# Patient Record
Sex: FEMALE | Race: WHITE | Employment: FULL TIME
[De-identification: names, ages, dates, MRNs, and addresses within clinical notes are randomized per-mention and may not be internally consistent; named-entity substitution may affect disease eponyms.]

---

## 2014-12-22 LAB — HIV AG/AB: NORMAL

## 2017-01-17 DIAGNOSIS — G43.109 MIGRAINE WITH AURA AND WITHOUT STATUS MIGRAINOSUS, NOT INTRACTABLE: ICD-10-CM

## 2017-01-17 RX ORDER — TOPIRAMATE 25 MG/1
25 TABLET ORAL 2 TIMES DAILY
Qty: 60 TABLET | Refills: 0 | Status: SHIPPED | OUTPATIENT
Start: 2017-01-17 | End: 2017-02-20 | Stop reason: SDUPTHER

## 2017-02-28 ENCOUNTER — OFFICE VISIT (OUTPATIENT)
Dept: FAMILY MEDICINE CLINIC | Facility: CLINIC | Age: 32
End: 2017-02-28

## 2017-02-28 VITALS
RESPIRATION RATE: 16 BRPM | HEART RATE: 84 BPM | SYSTOLIC BLOOD PRESSURE: 104 MMHG | WEIGHT: 184.2 LBS | DIASTOLIC BLOOD PRESSURE: 70 MMHG | BODY MASS INDEX: 37.13 KG/M2 | TEMPERATURE: 97.2 F | HEIGHT: 59 IN

## 2017-02-28 DIAGNOSIS — K21.9 GASTROESOPHAGEAL REFLUX DISEASE WITHOUT ESOPHAGITIS: Chronic | ICD-10-CM

## 2017-02-28 DIAGNOSIS — L60.3 BRITTLE NAILS: ICD-10-CM

## 2017-02-28 DIAGNOSIS — B00.9 HERPES SIMPLEX: ICD-10-CM

## 2017-02-28 DIAGNOSIS — G43.109 MIGRAINE WITH AURA AND WITHOUT STATUS MIGRAINOSUS, NOT INTRACTABLE: Primary | ICD-10-CM

## 2017-02-28 DIAGNOSIS — Z00.00 WELL ADULT EXAM: ICD-10-CM

## 2017-02-28 PROCEDURE — 99214 OFFICE O/P EST MOD 30 MIN: CPT | Performed by: NURSE PRACTITIONER

## 2017-02-28 RX ORDER — RIZATRIPTAN BENZOATE 10 MG/1
10 TABLET, ORALLY DISINTEGRATING ORAL
Qty: 9 TABLET | Refills: 1 | Status: SHIPPED | OUTPATIENT
Start: 2017-02-28 | End: 2017-07-18

## 2017-02-28 RX ORDER — PANTOPRAZOLE SODIUM 40 MG/1
40 TABLET, DELAYED RELEASE ORAL DAILY
Qty: 30 TABLET | Refills: 5 | Status: SHIPPED | OUTPATIENT
Start: 2017-02-28 | End: 2017-10-07 | Stop reason: SDUPTHER

## 2017-02-28 RX ORDER — TOPIRAMATE 25 MG/1
25 TABLET ORAL 2 TIMES DAILY
Qty: 60 TABLET | Refills: 5 | Status: SHIPPED | OUTPATIENT
Start: 2017-02-28 | End: 2022-01-11 | Stop reason: DRUGHIGH

## 2017-02-28 ASSESSMENT — ENCOUNTER SYMPTOMS
BELCHING: 1
BACK PAIN: 0
NAUSEA: 1
DIARRHEA: 0
EYE WATERING: 0
COUGH: 0
EYE PAIN: 1
EYE DISCHARGE: 0
TROUBLE SWALLOWING: 0
RHINORRHEA: 0
WHEEZING: 0
EYE REDNESS: 0
SORE THROAT: 0
BLURRED VISION: 1
CONSTIPATION: 1
SHORTNESS OF BREATH: 0
VOMITING: 1
ABDOMINAL PAIN: 1

## 2017-02-28 ASSESSMENT — PATIENT HEALTH QUESTIONNAIRE - PHQ9
SUM OF ALL RESPONSES TO PHQ QUESTIONS 1-9: 0
SUM OF ALL RESPONSES TO PHQ9 QUESTIONS 1 & 2: 0
1. LITTLE INTEREST OR PLEASURE IN DOING THINGS: 0
2. FEELING DOWN, DEPRESSED OR HOPELESS: 0

## 2017-03-01 ENCOUNTER — HOSPITAL ENCOUNTER (OUTPATIENT)
Age: 32
Setting detail: SPECIMEN
Discharge: HOME | End: 2017-03-01
Payer: COMMERCIAL

## 2017-03-01 DIAGNOSIS — Z00.00 WELL ADULT EXAM: ICD-10-CM

## 2017-03-01 DIAGNOSIS — L60.3 BRITTLE NAILS: ICD-10-CM

## 2017-03-01 LAB
ALBUMIN SERPL-MCNC: 4.2 G/DL (ref 3.5–5.2)
ALBUMIN/GLOBULIN RATIO: 1.8 (ref 1–2.5)
ALP BLD-CCNC: 108 U/L (ref 35–104)
ALT SERPL-CCNC: 21 U/L (ref 5–33)
ANION GAP SERPL CALCULATED.3IONS-SCNC: 16 MMOL/L (ref 9–17)
AST SERPL-CCNC: 13 U/L
BILIRUB SERPL-MCNC: <0.1 MG/DL (ref 0.3–1.2)
BUN BLDV-MCNC: 9 MG/DL (ref 6–20)
BUN/CREAT BLD: ABNORMAL (ref 9–20)
CALCIUM SERPL-MCNC: 9 MG/DL (ref 8.6–10.4)
CHLORIDE BLD-SCNC: 104 MMOL/L (ref 98–107)
CHOLESTEROL/HDL RATIO: 8.6
CHOLESTEROL: 180 MG/DL
CO2: 23 MMOL/L (ref 20–31)
CREAT SERPL-MCNC: 0.74 MG/DL (ref 0.5–0.9)
GFR AFRICAN AMERICAN: >60 ML/MIN
GFR NON-AFRICAN AMERICAN: >60 ML/MIN
GFR SERPL CREATININE-BSD FRML MDRD: ABNORMAL ML/MIN/{1.73_M2}
GFR SERPL CREATININE-BSD FRML MDRD: ABNORMAL ML/MIN/{1.73_M2}
GLUCOSE BLD-MCNC: 96 MG/DL (ref 70–99)
HCT VFR BLD CALC: 30.3 % (ref 36–46)
HDLC SERPL-MCNC: 21 MG/DL
HEMOGLOBIN: 10.1 G/DL (ref 12–16)
LDL CHOLESTEROL: 116 MG/DL (ref 0–130)
MCH RBC QN AUTO: 24.6 PG (ref 26–34)
MCHC RBC AUTO-ENTMCNC: 33.4 G/DL (ref 31–37)
MCV RBC AUTO: 73.7 FL (ref 80–100)
PDW BLD-RTO: 16 % (ref 12.5–15.4)
PLATELET # BLD: 346 K/UL (ref 140–450)
PMV BLD AUTO: 7.2 FL (ref 6–12)
POTASSIUM SERPL-SCNC: 4.2 MMOL/L (ref 3.7–5.3)
RBC # BLD: 4.11 M/UL (ref 4–5.2)
SODIUM BLD-SCNC: 143 MMOL/L (ref 135–144)
TOTAL PROTEIN: 6.6 G/DL (ref 6.4–8.3)
TRIGL SERPL-MCNC: 216 MG/DL
TSH SERPL DL<=0.05 MIU/L-ACNC: 2.05 MIU/L (ref 0.3–5)
VITAMIN B-12: 326 PG/ML (ref 211–946)
VITAMIN D 25-HYDROXY: 12.4 NG/ML (ref 30–100)
VLDLC SERPL CALC-MCNC: ABNORMAL MG/DL (ref 1–30)
WBC # BLD: 7.9 K/UL (ref 3.5–11)

## 2017-03-02 DIAGNOSIS — E55.9 VITAMIN D DEFICIENCY: Primary | ICD-10-CM

## 2017-03-03 RX ORDER — CHOLECALCIFEROL (VITAMIN D3) 1250 MCG
1 CAPSULE ORAL WEEKLY
Qty: 4 CAPSULE | Refills: 3 | Status: SHIPPED | OUTPATIENT
Start: 2017-03-03 | End: 2017-10-26 | Stop reason: ALTCHOICE

## 2017-03-29 ENCOUNTER — E-VISIT (OUTPATIENT)
Dept: FAMILY MEDICINE CLINIC | Facility: CLINIC | Age: 32
End: 2017-03-29

## 2017-03-29 DIAGNOSIS — B37.9 YEAST INFECTION: Primary | ICD-10-CM

## 2017-03-29 DIAGNOSIS — N39.0 URINARY TRACT INFECTION WITHOUT HEMATURIA, SITE UNSPECIFIED: ICD-10-CM

## 2017-03-30 RX ORDER — FLUCONAZOLE 150 MG/1
150 TABLET ORAL DAILY
Qty: 3 TABLET | Refills: 0 | Status: SHIPPED | OUTPATIENT
Start: 2017-03-30 | End: 2017-12-29 | Stop reason: SDUPTHER

## 2017-03-30 RX ORDER — SULFAMETHOXAZOLE AND TRIMETHOPRIM 800; 160 MG/1; MG/1
1 TABLET ORAL 2 TIMES DAILY
Qty: 14 TABLET | Refills: 0 | Status: SHIPPED | OUTPATIENT
Start: 2017-03-30 | End: 2017-04-06

## 2017-04-27 RX ORDER — VALACYCLOVIR HYDROCHLORIDE 1 G/1
1000 TABLET, FILM COATED ORAL DAILY
Qty: 30 TABLET | Refills: 5 | Status: SHIPPED | OUTPATIENT
Start: 2017-04-27 | End: 2017-11-02 | Stop reason: SDUPTHER

## 2017-06-21 ENCOUNTER — OFFICE VISIT (OUTPATIENT)
Dept: FAMILY MEDICINE CLINIC | Age: 32
End: 2017-06-21
Payer: COMMERCIAL

## 2017-06-21 VITALS
HEART RATE: 72 BPM | SYSTOLIC BLOOD PRESSURE: 110 MMHG | BODY MASS INDEX: 36.89 KG/M2 | DIASTOLIC BLOOD PRESSURE: 82 MMHG | TEMPERATURE: 98.3 F | HEIGHT: 59 IN | WEIGHT: 183 LBS | RESPIRATION RATE: 20 BRPM

## 2017-06-21 DIAGNOSIS — R10.2 PELVIC PAIN IN FEMALE: Primary | ICD-10-CM

## 2017-06-21 DIAGNOSIS — R45.89 DEPRESSED AFFECT: ICD-10-CM

## 2017-06-21 PROCEDURE — 99214 OFFICE O/P EST MOD 30 MIN: CPT | Performed by: NURSE PRACTITIONER

## 2017-06-21 ASSESSMENT — ENCOUNTER SYMPTOMS
COUGH: 0
SHORTNESS OF BREATH: 0
ABDOMINAL PAIN: 1
SORE THROAT: 0
EYE DISCHARGE: 0
RHINORRHEA: 0
VOMITING: 1
WHEEZING: 0
BACK PAIN: 0
DIARRHEA: 1
NAUSEA: 1
TROUBLE SWALLOWING: 0
CONSTIPATION: 1
EYE PAIN: 0

## 2017-07-18 ENCOUNTER — HOSPITAL ENCOUNTER (EMERGENCY)
Facility: CLINIC | Age: 32
Discharge: HOME | End: 2017-07-18
Attending: EMERGENCY MEDICINE
Payer: COMMERCIAL

## 2017-07-18 VITALS
HEART RATE: 97 BPM | TEMPERATURE: 98.4 F | DIASTOLIC BLOOD PRESSURE: 78 MMHG | OXYGEN SATURATION: 100 % | WEIGHT: 180 LBS | RESPIRATION RATE: 16 BRPM | SYSTOLIC BLOOD PRESSURE: 115 MMHG | BODY MASS INDEX: 37.79 KG/M2 | HEIGHT: 58 IN

## 2017-07-18 DIAGNOSIS — R10.30 RECURRENT LOWER ABDOMINAL PAIN: Primary | ICD-10-CM

## 2017-07-18 LAB
ABSOLUTE EOS #: 0.1 K/UL (ref 0–0.4)
ABSOLUTE LYMPH #: 1.9 K/UL (ref 1–4.8)
ABSOLUTE MONO #: 0.8 K/UL (ref 0.1–1.2)
ALBUMIN SERPL-MCNC: 4.2 G/DL (ref 3.5–5.2)
ALBUMIN/GLOBULIN RATIO: 1.1 (ref 1–2.5)
ALP BLD-CCNC: 102 U/L (ref 35–104)
ALT SERPL-CCNC: 20 U/L (ref 5–33)
ANION GAP SERPL CALCULATED.3IONS-SCNC: 16 MMOL/L (ref 9–17)
AST SERPL-CCNC: 15 U/L
BASOPHILS # BLD: 0 %
BASOPHILS ABSOLUTE: 0 K/UL (ref 0–0.2)
BILIRUB SERPL-MCNC: 0.3 MG/DL (ref 0.3–1.2)
BILIRUBIN DIRECT: 0.09 MG/DL
BILIRUBIN URINE: NEGATIVE
BILIRUBIN, INDIRECT: 0.21 MG/DL (ref 0–1)
BUN BLDV-MCNC: 7 MG/DL (ref 6–20)
BUN/CREAT BLD: ABNORMAL (ref 9–20)
CALCIUM SERPL-MCNC: 9.4 MG/DL (ref 8.6–10.4)
CHLORIDE BLD-SCNC: 104 MMOL/L (ref 98–107)
CO2: 20 MMOL/L (ref 20–31)
COLOR: YELLOW
COMMENT UA: NORMAL
CREAT SERPL-MCNC: 0.7 MG/DL (ref 0.5–0.9)
DIFFERENTIAL TYPE: ABNORMAL
EOSINOPHILS RELATIVE PERCENT: 1 %
GFR AFRICAN AMERICAN: >60 ML/MIN
GFR NON-AFRICAN AMERICAN: >60 ML/MIN
GFR SERPL CREATININE-BSD FRML MDRD: ABNORMAL ML/MIN/{1.73_M2}
GFR SERPL CREATININE-BSD FRML MDRD: ABNORMAL ML/MIN/{1.73_M2}
GLOBULIN: NORMAL G/DL (ref 1.5–3.8)
GLUCOSE BLD-MCNC: 118 MG/DL (ref 70–99)
GLUCOSE URINE: NEGATIVE
HCG QUALITATIVE: NEGATIVE
HCT VFR BLD CALC: 35.4 % (ref 36–46)
HEMOGLOBIN: 11.5 G/DL (ref 12–16)
KETONES, URINE: NEGATIVE
LEUKOCYTE ESTERASE, URINE: NEGATIVE
LIPASE: 25 U/L (ref 13–60)
LYMPHOCYTES # BLD: 13 %
MCH RBC QN AUTO: 24.7 PG (ref 26–34)
MCHC RBC AUTO-ENTMCNC: 32.5 G/DL (ref 31–37)
MCV RBC AUTO: 75.9 FL (ref 80–100)
MONOCYTES # BLD: 6 %
NITRITE, URINE: NEGATIVE
PDW BLD-RTO: 16.6 % (ref 12.5–15.4)
PH UA: 7.5 (ref 5–8)
PLATELET # BLD: 333 K/UL (ref 140–450)
PLATELET ESTIMATE: ABNORMAL
PMV BLD AUTO: 7 FL (ref 6–12)
POC CHLORIDE: 107 MMOL/L (ref 98–110)
POC POTASSIUM: 3.7 MMOL/L (ref 3.5–5.1)
POC SODIUM: 140 MMOL/L (ref 136–145)
POTASSIUM SERPL-SCNC: 4 MMOL/L (ref 3.7–5.3)
PROTEIN UA: NEGATIVE
RBC # BLD: 4.66 M/UL (ref 4–5.2)
RBC # BLD: ABNORMAL 10*6/UL
SEG NEUTROPHILS: 80 %
SEGMENTED NEUTROPHILS ABSOLUTE COUNT: 11.7 K/UL (ref 1.8–7.7)
SODIUM BLD-SCNC: 140 MMOL/L (ref 135–144)
SPECIFIC GRAVITY UA: 1.02 (ref 1–1.03)
TOTAL PROTEIN: 7.9 G/DL (ref 6.4–8.3)
TURBIDITY: CLEAR
URINE HGB: NEGATIVE
UROBILINOGEN, URINE: NORMAL
WBC # BLD: 14.5 K/UL (ref 3.5–11)
WBC # BLD: ABNORMAL 10*3/UL

## 2017-07-18 PROCEDURE — 36415 COLL VENOUS BLD VENIPUNCTURE: CPT

## 2017-07-18 PROCEDURE — 6360000002 HC RX W HCPCS: Performed by: EMERGENCY MEDICINE

## 2017-07-18 PROCEDURE — 85025 COMPLETE CBC W/AUTO DIFF WBC: CPT

## 2017-07-18 PROCEDURE — 99284 EMERGENCY DEPT VISIT MOD MDM: CPT

## 2017-07-18 PROCEDURE — 96375 TX/PRO/DX INJ NEW DRUG ADDON: CPT

## 2017-07-18 PROCEDURE — 80076 HEPATIC FUNCTION PANEL: CPT

## 2017-07-18 PROCEDURE — 2580000003 HC RX 258: Performed by: EMERGENCY MEDICINE

## 2017-07-18 PROCEDURE — 81003 URINALYSIS AUTO W/O SCOPE: CPT

## 2017-07-18 PROCEDURE — 87086 URINE CULTURE/COLONY COUNT: CPT

## 2017-07-18 PROCEDURE — 83690 ASSAY OF LIPASE: CPT

## 2017-07-18 PROCEDURE — 80048 BASIC METABOLIC PNL TOTAL CA: CPT

## 2017-07-18 PROCEDURE — 96374 THER/PROPH/DIAG INJ IV PUSH: CPT

## 2017-07-18 PROCEDURE — 84703 CHORIONIC GONADOTROPIN ASSAY: CPT

## 2017-07-18 RX ORDER — FENTANYL CITRATE 50 UG/ML
50 INJECTION, SOLUTION INTRAMUSCULAR; INTRAVENOUS ONCE
Status: COMPLETED | OUTPATIENT
Start: 2017-07-18 | End: 2017-07-18

## 2017-07-18 RX ORDER — ONDANSETRON 2 MG/ML
4 INJECTION INTRAMUSCULAR; INTRAVENOUS ONCE
Status: COMPLETED | OUTPATIENT
Start: 2017-07-18 | End: 2017-07-18

## 2017-07-18 RX ORDER — 0.9 % SODIUM CHLORIDE 0.9 %
1000 INTRAVENOUS SOLUTION INTRAVENOUS ONCE
Status: COMPLETED | OUTPATIENT
Start: 2017-07-18 | End: 2017-07-18

## 2017-07-18 RX ORDER — OXYCODONE HYDROCHLORIDE AND ACETAMINOPHEN 5; 325 MG/1; MG/1
1-2 TABLET ORAL EVERY 6 HOURS PRN
Qty: 20 TABLET | Refills: 0 | Status: SHIPPED | OUTPATIENT
Start: 2017-07-18 | End: 2017-07-25

## 2017-07-18 RX ORDER — IBUPROFEN 800 MG/1
800 TABLET ORAL EVERY 8 HOURS PRN
Qty: 30 TABLET | Refills: 0 | Status: SHIPPED | OUTPATIENT
Start: 2017-07-18 | End: 2018-07-27 | Stop reason: SDUPTHER

## 2017-07-18 RX ORDER — KETOROLAC TROMETHAMINE 30 MG/ML
30 INJECTION, SOLUTION INTRAMUSCULAR; INTRAVENOUS ONCE
Status: COMPLETED | OUTPATIENT
Start: 2017-07-18 | End: 2017-07-18

## 2017-07-18 RX ADMIN — ONDANSETRON 4 MG: 2 INJECTION INTRAMUSCULAR; INTRAVENOUS at 12:41

## 2017-07-18 RX ADMIN — SODIUM CHLORIDE 1000 ML: 9 INJECTION, SOLUTION INTRAVENOUS at 11:20

## 2017-07-18 RX ADMIN — FENTANYL CITRATE 50 MCG: 50 INJECTION INTRAMUSCULAR; INTRAVENOUS at 12:42

## 2017-07-18 RX ADMIN — KETOROLAC TROMETHAMINE 30 MG: 30 INJECTION, SOLUTION INTRAMUSCULAR at 11:20

## 2017-07-18 ASSESSMENT — PAIN DESCRIPTION - PAIN TYPE: TYPE: ACUTE PAIN

## 2017-07-18 ASSESSMENT — PAIN DESCRIPTION - DESCRIPTORS: DESCRIPTORS: CRAMPING;SHARP

## 2017-07-18 ASSESSMENT — ENCOUNTER SYMPTOMS
ABDOMINAL PAIN: 1
SHORTNESS OF BREATH: 0
NAUSEA: 0
BLOOD IN STOOL: 0
DIARRHEA: 1
COUGH: 0
BACK PAIN: 0
CONSTIPATION: 0
EYE PAIN: 0
VOMITING: 0

## 2017-07-18 ASSESSMENT — PAIN SCALES - GENERAL
PAINLEVEL_OUTOF10: 6
PAINLEVEL_OUTOF10: 2
PAINLEVEL_OUTOF10: 6

## 2017-07-18 ASSESSMENT — PAIN DESCRIPTION - FREQUENCY: FREQUENCY: INTERMITTENT

## 2017-07-19 LAB
CULTURE: NO GROWTH
CULTURE: NORMAL
Lab: NORMAL
SPECIMEN DESCRIPTION: NORMAL
SPECIMEN DESCRIPTION: NORMAL
STATUS: NORMAL

## 2017-09-15 ENCOUNTER — TELEPHONE (OUTPATIENT)
Dept: FAMILY MEDICINE CLINIC | Age: 32
End: 2017-09-15

## 2017-09-15 DIAGNOSIS — E55.9 VITAMIN D DEFICIENCY: ICD-10-CM

## 2017-09-15 RX ORDER — CHOLECALCIFEROL (VITAMIN D3) 1250 MCG
1 CAPSULE ORAL WEEKLY
Qty: 4 CAPSULE | Refills: 3 | OUTPATIENT
Start: 2017-09-15 | End: 2017-12-30

## 2017-09-19 ENCOUNTER — HOSPITAL ENCOUNTER (OUTPATIENT)
Age: 32
Setting detail: SPECIMEN
Discharge: HOME | End: 2017-09-19
Payer: COMMERCIAL

## 2017-09-19 DIAGNOSIS — E55.9 VITAMIN D DEFICIENCY: ICD-10-CM

## 2017-09-19 LAB — VITAMIN D 25-HYDROXY: 51.1 NG/ML (ref 30–100)

## 2017-10-07 DIAGNOSIS — K21.9 GASTROESOPHAGEAL REFLUX DISEASE WITHOUT ESOPHAGITIS: Chronic | ICD-10-CM

## 2017-10-09 NOTE — TELEPHONE ENCOUNTER
LOV and LRF 2/28/17    Health Maintenance   Topic Date Due    HIV screen  04/28/2000    Flu vaccine (1) 09/01/2017    Cervical cancer screen  12/01/2017    DTaP/Tdap/Td vaccine (2 - Td) 07/09/2025             (applicable per patient's age: Cancer Screenings, Depression Screening, Fall Risk Screening, Immunizations)    Hemoglobin A1C (%)   Date Value   10/26/2012 4.6     LDL Cholesterol (mg/dL)   Date Value   03/01/2017 116     AST (U/L)   Date Value   07/18/2017 15     ALT (U/L)   Date Value   07/18/2017 20     BUN (mg/dL)   Date Value   07/18/2017 7      (goal A1C is < 7)   (goal LDL is <100) need 30-50% reduction from baseline     BP Readings from Last 3 Encounters:   07/18/17 115/78   06/21/17 110/82   02/28/17 104/70    (goal /80)      All Future Testing planned in [facility]:  Lab Frequency Next Occurrence       Next Visit Date:  No future appointments.          Patient Active Problem List:     GERD (gastroesophageal reflux disease)     MTHFR (methylene THF reductase) deficiency and homocystinuria (HCC)     Migraine with aura and without status migrainosus, not intractable     Herpes simplex

## 2017-10-10 RX ORDER — PANTOPRAZOLE SODIUM 40 MG/1
40 TABLET, DELAYED RELEASE ORAL DAILY
Qty: 30 TABLET | Refills: 4 | Status: SHIPPED | OUTPATIENT
Start: 2017-10-10 | End: 2018-03-21 | Stop reason: SDUPTHER

## 2017-10-26 ENCOUNTER — OFFICE VISIT (OUTPATIENT)
Dept: FAMILY MEDICINE CLINIC | Age: 32
End: 2017-10-26
Payer: COMMERCIAL

## 2017-10-26 VITALS
HEIGHT: 58 IN | DIASTOLIC BLOOD PRESSURE: 74 MMHG | HEART RATE: 82 BPM | RESPIRATION RATE: 16 BRPM | BODY MASS INDEX: 37.11 KG/M2 | WEIGHT: 176.8 LBS | SYSTOLIC BLOOD PRESSURE: 116 MMHG

## 2017-10-26 DIAGNOSIS — T75.3XXA CAR SICKNESS, INITIAL ENCOUNTER: ICD-10-CM

## 2017-10-26 DIAGNOSIS — R42 DIZZINESS: Primary | ICD-10-CM

## 2017-10-26 DIAGNOSIS — G43.109 MIGRAINE WITH AURA AND WITHOUT STATUS MIGRAINOSUS, NOT INTRACTABLE: ICD-10-CM

## 2017-10-26 PROCEDURE — 99214 OFFICE O/P EST MOD 30 MIN: CPT | Performed by: NURSE PRACTITIONER

## 2017-10-26 RX ORDER — ACETAMINOPHEN 160 MG
1 TABLET,DISINTEGRATING ORAL DAILY
Qty: 30 CAPSULE | COMMUNITY
Start: 2017-10-26 | End: 2018-10-26

## 2017-10-26 RX ORDER — NORETHINDRONE ACETATE AND ETHINYL ESTRADIOL, AND FERROUS FUMARATE 1MG-20(24)
1 KIT ORAL DAILY
Qty: 28 CAPSULE | COMMUNITY
Start: 2017-10-26

## 2017-10-26 RX ORDER — CHLORAL HYDRATE 500 MG
1000 CAPSULE ORAL 3 TIMES DAILY
Qty: 90 CAPSULE | COMMUNITY
Start: 2017-10-26 | End: 2018-10-26

## 2017-10-26 RX ORDER — MECLIZINE HCL 12.5 MG/1
12.5 TABLET ORAL 3 TIMES DAILY PRN
Qty: 90 TABLET | Refills: 0 | Status: SHIPPED | OUTPATIENT
Start: 2017-10-26 | End: 2017-11-25

## 2017-10-26 RX ORDER — GLUCOSA SU 2KCL/CHONDROITIN SU 500-400 MG
100 CAPSULE ORAL DAILY
Qty: 30 CAPSULE | COMMUNITY
Start: 2017-10-26 | End: 2018-10-26

## 2017-10-26 ASSESSMENT — ENCOUNTER SYMPTOMS
ABDOMINAL DISTENTION: 0
CHEST TIGHTNESS: 0
SORE THROAT: 0
NAUSEA: 1
ABDOMINAL PAIN: 0
DIARRHEA: 0
SHORTNESS OF BREATH: 0
SINUS PRESSURE: 0
BLOOD IN STOOL: 0
EYE DISCHARGE: 0
WHEEZING: 0

## 2017-10-26 NOTE — PATIENT INSTRUCTIONS
Patient Education        Dizziness: Care Instructions  Your Care Instructions  Dizziness is the feeling of unsteadiness or fuzziness in your head. It is different than having vertigo, which is a feeling that the room is spinning or that you are moving or falling. It is also different from lightheadedness, which is the feeling that you are about to faint. It can be hard to know what causes dizziness. Some people feel dizzy when they have migraine headaches. Sometimes bouts of flu can make you feel dizzy. Some medical conditions, such as heart problems or high blood pressure, can make you feel dizzy. Many medicines can cause dizziness, including medicines for high blood pressure, pain, or anxiety. If a medicine causes your symptoms, your doctor may recommend that you stop or change the medicine. If it is a problem with your heart, you may need medicine to help your heart work better. If there is no clear reason for your symptoms, your doctor may suggest watching and waiting for a while to see if the dizziness goes away on its own. Follow-up care is a key part of your treatment and safety. Be sure to make and go to all appointments, and call your doctor if you are having problems. It's also a good idea to know your test results and keep a list of the medicines you take. How can you care for yourself at home? · If your doctor recommends or prescribes medicine, take it exactly as directed. Call your doctor if you think you are having a problem with your medicine. · Do not drive while you feel dizzy. · Try to prevent falls. Steps you can take include:  ¨ Using nonskid mats, adding grab bars near the tub, and using night-lights. ¨ Clearing your home so that walkways are free of anything you might trip on. ¨ Letting family and friends know that you have been feeling dizzy. This will help them know how to help you. When should you call for help? Call 911 anytime you think you may need emergency care.  For sickness is nausea caused by riding in a car, airplane, train, or boat. It can also cause vomiting, sweating, and headache. Motion sickness is sometimes called carsickness, airsickness, or seasickness. You can also get motion sickness from playing video games, looking through a microscope, or other activities. Problems caused by motion sickness usually go away soon after the motion stops. Sometimes it can take a few days for symptoms to go away. Motion sickness can be treated with either over-the-counter or prescription medicine. The medicines come as pills, a patch, or a shot. Some people try ginger or ginger ale to help nausea. Some people also think wristbands that put pressure on a certain spot can reduce motion sickness. Follow-up care is a key part of your treatment and safety. Be sure to make and go to all appointments, and call your doctor if you are having problems. It's also a good idea to know your test results and keep a list of the medicines you take. How can you care for yourself at home? · Sit in the front seat of a car or near the wings when you fly in an airplane. · Try not to move your head. Keep your head still by pressing it into a headrest.  · On a boat, get a cabin near the middle of the ship. Go outside often to get fresh air. · When in a car, boat, or airplane, look at one place on the horizon. · Do not read or watch TV in a moving vehicle. · Do not drink alcohol or eat a big meal before traveling. · Eat small meals during long trips. · Try a few soda crackers and a carbonated drink if you feel ill. · Try ginger, ginger tea, or ginger ale before you travel. · Try an over-the-counter medicine, such as dimenhydrinate (Dramamine), diphenhydramine (Benadryl), or meclizine (Bonine), about an hour before you travel. These medicines can make you feel sleepy. Do not drive while using them. · If you get prescription medicine from your doctor, take your medicines exactly as prescribed. Be aware that these medicines may make you sleepy. Call your doctor if you think you are having a problem with your medicine. When should you call for help? Call your doctor now or seek immediate medical care if:  · You have nausea and vomiting that does not go away after treatment. Watch closely for changes in your health, and be sure to contact your doctor if:  · Your symptoms do not go away within 3 days after a trip. · You do not get better as expected. Where can you learn more? Go to https://AdLemonspeHeyStaks.BrandYourself. org and sign in to your [employer] account. Enter C608 in the GenPrime box to learn more about \"Motion Sickness: Care Instructions. \"     If you do not have an account, please click on the \"Sign Up Now\" link. Current as of: July 29, 2016  Content Version: 11.3  © 2029-7323 [employer]. Care instructions adapted under license by [facility] ([facility]). If you have questions about a medical condition or this instruction, always ask your healthcare professional. [place] [address] any warranty or liability for your use of this information. Patient Education        Migraine Headache: Care Instructions  Your Care Instructions  Migraines are painful, throbbing headaches that often start on one side of the head. They may cause nausea and vomiting and make you sensitive to light, sound, or smell. Without treatment, migraines can last from 4 hours to a few days. Medicines can help prevent migraines or stop them after they have started. Your doctor can help you find which ones work best for you. Follow-up care is a key part of your treatment and safety. Be sure to make and go to all appointments, and call your doctor if you are having problems. It's also a good idea to know your test results and keep a list of the medicines you take. How can you care for yourself at home? · Do not drive if you have taken a prescription pain medicine.   · Rest in a quiet, dark your health, and be sure to contact your doctor if:  · You are not getting better after 2 days (48 hours). Where can you learn more? Go to https://"Scoopler[employer] [email or web address]."[email or web address] [email or web address] org and sign in to your [employer] account. Enter T191 in the VidBid box to learn more about \"Migraine Headache: Care Instructions. \"     If you do not have an account, please click on the \"Sign Up Now\" link. Current as of: October 14, 2016  Content Version: 11.3  © 4938-8013 [employer]Musician, Incorporated. Care instructions adapted under license by [facility] ([facility]). If you have questions about a medical condition or this instruction, always ask your healthcare professional. [address] any warranty or liability for your use of this information.

## 2017-10-26 NOTE — PROGRESS NOTES
Subjective:      Patient ID: Nell Camarillo is a 28 y.o. female. Visit Information    Have you changed or started any medications since your last visit including any over-the-counter medicines, vitamins, or herbal medicines? no   Are you having any side effects from any of your medications? -  no  Have you stopped taking any of your medications? Is so, why? -  no    Have you seen any other physician or provider since your last visit? No  Have you had any other diagnostic tests since your last visit? No  Have you been seen in the emergency room and/or had an admission to a hospital since we last saw you? No  Have you had your routine dental cleaning in the past 6 months? no    Have you activated your BoxFox account? If not, what are your barriers? Yes     Patient Care Team:  Maria Ines Johnson MD as PCP - General    Medical History Review  Past Medical, Family, and Social History reviewed and does not contribute to the patient presenting condition    Health Maintenance   Topic Date Due    HIV screen  04/28/2000    Cervical cancer screen  12/01/2017    Flu vaccine (1) 01/01/2018 (Originally 9/1/2017)    DTaP/Tdap/Td vaccine (2 - Td) 07/09/2025       Dizziness   This is a new problem. The current episode started more than 1 month ago. The problem occurs intermittently. The problem has been gradually worsening. Associated symptoms include fatigue, headaches, nausea and neck pain. Pertinent negatives include no abdominal pain, arthralgias, chest pain, congestion, fever, joint swelling, myalgias, rash, sore throat, swollen glands or urinary symptoms. Exacerbated by: headaches and riding in car. She has tried drinking, lying down, rest and relaxation for the symptoms. The treatment provided no relief. Headache    This is a chronic problem. The current episode started more than 1 year ago. The problem occurs intermittently. The problem has been gradually worsening. The pain does not radiate.  The pain quality is similar place, and time. She has normal strength. She exhibits normal muscle tone. Coordination normal.   Skin: Skin is warm, dry and intact. No rash noted. No erythema. Psychiatric: She has a normal mood and affect. Her speech is normal and behavior is normal. Thought content normal.   Nursing note and vitals reviewed. Assessment:      1. Dizziness  [facility] Adonis Duvall MD   2. Car sickness, initial encounter     3. Migraine with aura and without status migrainosus, not intractable  Coenzyme Q10 (CO Q10) 100 MG CAPS    Magnesium 400 MG CAPS    [facility] Headache Adonis Duvall MD         Plan:      Eco drink one packet daily  Proceed with start CoQ10, Vit D, Vit B12, fish oil, and magnesium OTC   Consider stopping OCP if headaches continue   Consider increasing topamax to 50mg twice daily  Neurology consult for evaluation   Advise strict regime - good sleep hygiene and healthy eating habits  Increase fluids to stay hydrated  Proceed with start meclizine as needed 30 minutes prior to car rides to see if helps with nausea / car sickness   Limit caffeine and processed foods  Monitor for worsening symptoms   Call with concerns   Return if symptoms worsen or fail to improve. Kaylah received counseling on the following healthy behaviors: nutrition, exercise and medication adherence  Reviewed prior labs and health maintenance. Continue current medications, diet and exercise. Discussed use, benefit, and side effects of prescribed medications. Barriers to medication compliance addressed. Patient given educational materials - see patient instructions. All patient questions answered. Patient voiced understanding.

## 2017-11-02 RX ORDER — VALACYCLOVIR HYDROCHLORIDE 1 G/1
1000 TABLET, FILM COATED ORAL DAILY
Qty: 30 TABLET | Refills: 4 | Status: SHIPPED | OUTPATIENT
Start: 2017-11-02 | End: 2018-04-11 | Stop reason: SDUPTHER

## 2017-11-02 NOTE — TELEPHONE ENCOUNTER
LOV 10-26-17  LRF 4-27-17  Health Maintenance   Topic Date Due    HIV screen  04/28/2000    Cervical cancer screen  12/01/2017    Flu vaccine (1) 01/01/2018 (Originally 9/1/2017)    DTaP/Tdap/Td vaccine (2 - Td) 07/09/2025             (applicable per patient's age: Cancer Screenings, Depression Screening, Fall Risk Screening, Immunizations)    Hemoglobin A1C (%)   Date Value   10/26/2012 4.6     LDL Cholesterol (mg/dL)   Date Value   03/01/2017 116     AST (U/L)   Date Value   07/18/2017 15     ALT (U/L)   Date Value   07/18/2017 20     BUN (mg/dL)   Date Value   07/18/2017 7      (goal A1C is < 7)   (goal LDL is <100) need 30-50% reduction from baseline     BP Readings from Last 3 Encounters:   10/26/17 116/74   07/18/17 115/78   06/21/17 110/82    (goal /80)      All Future Testing planned in [facility]:  Lab Frequency Next Occurrence       Next Visit Date:  No future appointments.          Patient Active Problem List:     GERD (gastroesophageal reflux disease)     MTHFR (methylene THF reductase) deficiency and homocystinuria (HCC)     Migraine with aura and without status migrainosus, not intractable     Herpes simplex

## 2017-11-04 ASSESSMENT — ENCOUNTER SYMPTOMS
BLURRED VISION: 0
SWOLLEN GLANDS: 0
PHOTOPHOBIA: 1

## 2017-11-16 ENCOUNTER — OFFICE VISIT (OUTPATIENT)
Dept: FAMILY MEDICINE CLINIC | Age: 32
End: 2017-11-16
Payer: COMMERCIAL

## 2017-11-16 VITALS
TEMPERATURE: 98 F | RESPIRATION RATE: 18 BRPM | DIASTOLIC BLOOD PRESSURE: 70 MMHG | SYSTOLIC BLOOD PRESSURE: 112 MMHG | HEIGHT: 58 IN | BODY MASS INDEX: 37.74 KG/M2 | WEIGHT: 179.8 LBS | HEART RATE: 78 BPM

## 2017-11-16 DIAGNOSIS — H69.83 EUSTACHIAN TUBE DYSFUNCTION, BILATERAL: ICD-10-CM

## 2017-11-16 DIAGNOSIS — J06.9 VIRAL UPPER RESPIRATORY ILLNESS: Primary | ICD-10-CM

## 2017-11-16 PROCEDURE — 99212 OFFICE O/P EST SF 10 MIN: CPT | Performed by: INTERNAL MEDICINE

## 2017-11-16 RX ORDER — FEXOFENADINE HCL 180 MG/1
180 TABLET ORAL DAILY
Qty: 30 TABLET | Refills: 0 | Status: SHIPPED | OUTPATIENT
Start: 2017-11-16 | End: 2018-10-26

## 2017-11-16 RX ORDER — FLUTICASONE PROPIONATE 50 MCG
1 SPRAY, SUSPENSION (ML) NASAL DAILY
Qty: 1 BOTTLE | Refills: 0 | Status: SHIPPED | OUTPATIENT
Start: 2017-11-16 | End: 2018-10-26

## 2017-11-16 ASSESSMENT — ENCOUNTER SYMPTOMS
TROUBLE SWALLOWING: 0
WHEEZING: 0
SINUS PAIN: 1
VOICE CHANGE: 0
SORE THROAT: 0
COUGH: 1
CHEST TIGHTNESS: 0
SHORTNESS OF BREATH: 0

## 2017-11-16 NOTE — PROGRESS NOTES
Subjective:      Patient ID: Bertram Sheikh is a 28 y.o. female. Visit Information    Have you changed or started any medications since your last visit including any over-the-counter medicines, vitamins, or herbal medicines? no   Are you having any side effects from any of your medications? -  no  Have you stopped taking any of your medications? Is so, why? -  no    Have you seen any other physician or provider since your last visit? Yes - Records Obtained  Have you had any other diagnostic tests since your last visit? No  Have you been seen in the emergency room and/or had an admission to a hospital since we last saw you? No  Have you had your routine dental cleaning in the past 6 months? yes    Have you activated your Actiance account? If not, what are your barriers? Yes     Patient Care Team:  Rajiv Christine MD as PCP - General    Medical History Review  Past Medical, Family, and Social History reviewed. Health Maintenance   Topic Date Due    Cervical cancer screen  12/01/2017    Flu vaccine (1) 01/01/2018 (Originally 9/1/2017)    HIV screen  01/01/2018 (Originally 4/28/2000)    DTaP/Tdap/Td vaccine (2 - Td) 07/09/2025     Chief Complaint   Patient presents with    Cough    Nasal Congestion    Shortness of Breath       27 y/o with GERD, migraine presents today with c/o Nasal congestion and cough x4d  HPI  Started over Saturday night with nasal congestion which has progressively gotten worse.   + dry cough. Feels chest is heavy when coughs. Denies fever/chills. +sweating last night. Denies SOB at baseline. Getting short winded after coughing bout. 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 Review of Systems   Constitutional: Negative for appetite change, chills, fatigue and fever. HENT: Positive for sinus pain. Negative for ear pain (but has 'pressure' in both ears), hearing loss, sore throat, tinnitus, trouble swallowing and voice change. Respiratory: Positive for cough.  Negative for

## 2017-11-16 NOTE — LETTER
[address] [address]  Phone: [phone]  Fax: [phone]    Jono Aguillon MD        November 16, 2017     Patient: Bertram Sheikh   YOB: 1985   Date of Visit: 11/16/2017       To Whom it May Concern:    Bertram Sheikh was seen in my clinic on 11/16/2017. She may return to work on 11/17/2017. If you have any questions or concerns, please don't hesitate to call.     Sincerely,         Jono Aguillon MD

## 2017-12-19 ENCOUNTER — OFFICE VISIT (OUTPATIENT)
Dept: FAMILY MEDICINE CLINIC | Age: 32
End: 2017-12-19
Payer: COMMERCIAL

## 2017-12-19 VITALS
DIASTOLIC BLOOD PRESSURE: 72 MMHG | BODY MASS INDEX: 37.54 KG/M2 | RESPIRATION RATE: 20 BRPM | WEIGHT: 174 LBS | HEIGHT: 57 IN | SYSTOLIC BLOOD PRESSURE: 96 MMHG | TEMPERATURE: 97 F | HEART RATE: 72 BPM

## 2017-12-19 DIAGNOSIS — J01.20 ACUTE NON-RECURRENT ETHMOIDAL SINUSITIS: Primary | ICD-10-CM

## 2017-12-19 PROCEDURE — 99213 OFFICE O/P EST LOW 20 MIN: CPT | Performed by: PEDIATRICS

## 2017-12-19 RX ORDER — AMOXICILLIN 500 MG/1
500 CAPSULE ORAL 3 TIMES DAILY
Qty: 30 CAPSULE | Refills: 0 | Status: SHIPPED | OUTPATIENT
Start: 2017-12-19 | End: 2017-12-29

## 2017-12-19 ASSESSMENT — ENCOUNTER SYMPTOMS
COUGH: 1
EYE DISCHARGE: 0
TROUBLE SWALLOWING: 0
WHEEZING: 0
EYE REDNESS: 0
DIARRHEA: 0
NAUSEA: 0
SINUS PRESSURE: 1
SHORTNESS OF BREATH: 0

## 2017-12-19 NOTE — Clinical Note
Get pap results from GYN - at [place] [address] or Dr. Cody Baumann at [facility]

## 2017-12-19 NOTE — PROGRESS NOTES
Spoke with candy at Dr. Brenda Benavidez office and she will be faxing over the PAP results
Reviewed prior labs and health maintenance  3. Continue current medications, diet and exercise. 4.  Discussed use, benefit, and side effects of prescribed medications. Barriers to medication compliance addressed. All patient questions answered. Pt voiced understanding. 5.  Patient given educational materials - see patient instructions  6. Was a self-tracking handout given in paper form or via [employer]? No  If yes, see orders or list here. Completed Refills   Requested Prescriptions     Signed Prescriptions Disp Refills    amoxicillin (AMOXIL) 500 MG capsule 30 capsule 0     Sig: Take 1 capsule by mouth 3 times daily for 10 days       All patient questions answered. Patient voiced understanding.        PHQ Scores 2/28/2017   PHQ2 Score 0   PHQ9 Score 0     Interpretation of Total Score Depression Severity: 1-4 = Minimal depression, 5-9 = Mild depression, 10-14 = Moderate depression, 15-19 = Moderately severe depression, 20-27 = Severe depression  Normal

## 2017-12-19 NOTE — PATIENT INSTRUCTIONS
It's also a good idea to know your test results and keep a list of the medicines you take. How can you care for yourself at home? · You can buy premixed saline solution in a squeeze bottle or other sinus rinse products at a drugstore. Read and follow the instructions on the label. · You also can make your own saline solution by adding 1 teaspoon of salt and 1 teaspoon of baking soda to 2 cups of distilled water. · If you use a homemade solution, pour a small amount into a clean bowl. Using a rubber bulb syringe, squeeze the syringe and place the tip in the salt water. Pull a small amount of the salt water into the syringe by relaxing your hand. · Sit down with your head tilted slightly back. Do not lie down. Put the tip of the bulb syringe or the squeeze bottle a little way into one of your nostrils. Gently drip or squirt a few drops into the nostril. Repeat with the other nostril. Some sneezing and gagging are normal at first.  · Gently blow your nose. · Wipe the syringe or bottle tip clean after each use. · Repeat this 2 or 3 times a day. · Use nasal washes gently if you have nosebleeds often. When should you call for help? Watch closely for changes in your health, and be sure to contact your doctor if:  ? · You often get nosebleeds. ? · You have problems doing the nasal washes. Where can you learn more? Go to https://Chai EnergypeStaphOff Biotech.Respiratory Technologies. org and sign in to your Heatwave Interactive account. Enter [phone] in the [facility] box to learn more about \"Saline Nasal Washes: Care Instructions. \"     If you do not have an account, please click on the \"Sign Up Now\" link. Current as of: May 12, 2017  Content Version: 11.4  © 7641-8217 Healthwise, Incorporated. Care instructions adapted under license by [facility] ([facility]).  If you have questions about a medical condition or this instruction, always ask your healthcare professional. [address] any warranty or liability for your use of this information.

## 2017-12-29 ENCOUNTER — PATIENT MESSAGE (OUTPATIENT)
Dept: FAMILY MEDICINE CLINIC | Age: 32
End: 2017-12-29

## 2017-12-29 DIAGNOSIS — B37.9 YEAST INFECTION: ICD-10-CM

## 2017-12-29 RX ORDER — FLUCONAZOLE 150 MG/1
150 TABLET ORAL DAILY
Qty: 3 TABLET | Refills: 0 | Status: SHIPPED | OUTPATIENT
Start: 2017-12-29 | End: 2018-01-01

## 2017-12-29 NOTE — TELEPHONE ENCOUNTER
From: Berry Ham  To: Candance Heal, MD  Sent: 12/29/2017 12:07 PM EST  Subject: Non-Urgent Medical Question    I am finishing up my antibiotics today and I now have a yeast infection. I always get them at the tail end of taking a round of that antibiotic. I was wondering if I could get something called into [facility] in [place] for it. The over the cou her  never seem to take care of them and the yeast infections make other issues I have flair up. Thank you.

## 2018-02-05 ENCOUNTER — HOSPITAL ENCOUNTER (OUTPATIENT)
Dept: GENERAL RADIOLOGY | Facility: CLINIC | Age: 33
Discharge: HOME | End: 2018-02-07
Payer: COMMERCIAL

## 2018-02-05 ENCOUNTER — HOSPITAL ENCOUNTER (OUTPATIENT)
Facility: CLINIC | Age: 33
Discharge: HOME | End: 2018-02-07
Payer: COMMERCIAL

## 2018-02-05 ENCOUNTER — OFFICE VISIT (OUTPATIENT)
Dept: FAMILY MEDICINE CLINIC | Age: 33
End: 2018-02-05
Payer: COMMERCIAL

## 2018-02-05 VITALS
RESPIRATION RATE: 20 BRPM | SYSTOLIC BLOOD PRESSURE: 100 MMHG | DIASTOLIC BLOOD PRESSURE: 68 MMHG | TEMPERATURE: 97.3 F | HEART RATE: 70 BPM | BODY MASS INDEX: 37.44 KG/M2 | WEIGHT: 173 LBS

## 2018-02-05 DIAGNOSIS — G89.29 CHRONIC PAIN OF RIGHT THUMB: ICD-10-CM

## 2018-02-05 DIAGNOSIS — M79.644 CHRONIC PAIN OF RIGHT THUMB: ICD-10-CM

## 2018-02-05 DIAGNOSIS — M25.531 RIGHT WRIST PAIN: ICD-10-CM

## 2018-02-05 DIAGNOSIS — M25.531 RIGHT WRIST PAIN: Primary | ICD-10-CM

## 2018-02-05 PROCEDURE — 73100 X-RAY EXAM OF WRIST: CPT

## 2018-02-05 PROCEDURE — 73130 X-RAY EXAM OF HAND: CPT

## 2018-02-05 PROCEDURE — 99213 OFFICE O/P EST LOW 20 MIN: CPT | Performed by: PEDIATRICS

## 2018-02-05 ASSESSMENT — ENCOUNTER SYMPTOMS
PHOTOPHOBIA: 0
SHORTNESS OF BREATH: 0
EYE REDNESS: 0
RHINORRHEA: 0
DIARRHEA: 0
ABDOMINAL PAIN: 0
NAUSEA: 0
STRIDOR: 0
VOMITING: 0
CONSTIPATION: 0
EYE PAIN: 0
WHEEZING: 0
COLOR CHANGE: 0
EYE DISCHARGE: 0
COUGH: 0

## 2018-02-06 DIAGNOSIS — M25.531 RIGHT WRIST PAIN: Primary | ICD-10-CM

## 2018-02-06 DIAGNOSIS — M25.531 RIGHT WRIST PAIN: ICD-10-CM

## 2018-02-06 DIAGNOSIS — M25.431 SWELLING OF JOINT OF RIGHT WRIST: Primary | ICD-10-CM

## 2018-02-14 ENCOUNTER — HOSPITAL ENCOUNTER (OUTPATIENT)
Dept: MRI IMAGING | Facility: CLINIC | Age: 33
Discharge: HOME | End: 2018-02-16
Payer: COMMERCIAL

## 2018-02-14 DIAGNOSIS — M25.531 RIGHT WRIST PAIN: ICD-10-CM

## 2018-02-14 PROCEDURE — 73221 MRI JOINT UPR EXTREM W/O DYE: CPT

## 2018-02-15 ENCOUNTER — TELEPHONE (OUTPATIENT)
Dept: FAMILY MEDICINE CLINIC | Age: 33
End: 2018-02-15

## 2018-02-15 DIAGNOSIS — M67.40 GANGLION CYST: Primary | ICD-10-CM

## 2018-03-21 DIAGNOSIS — K21.9 GASTROESOPHAGEAL REFLUX DISEASE WITHOUT ESOPHAGITIS: Chronic | ICD-10-CM

## 2018-03-22 RX ORDER — PANTOPRAZOLE SODIUM 40 MG/1
40 TABLET, DELAYED RELEASE ORAL DAILY
Qty: 30 TABLET | Refills: 5 | Status: SHIPPED | OUTPATIENT
Start: 2018-03-22 | End: 2018-09-22 | Stop reason: SDUPTHER

## 2018-04-12 RX ORDER — VALACYCLOVIR HYDROCHLORIDE 1 G/1
1000 TABLET, FILM COATED ORAL DAILY
Qty: 30 TABLET | Refills: 3 | Status: SHIPPED | OUTPATIENT
Start: 2018-04-12 | End: 2018-08-21 | Stop reason: SDUPTHER

## 2018-04-17 RX ORDER — VALACYCLOVIR HYDROCHLORIDE 1 G/1
1000 TABLET, FILM COATED ORAL DAILY
Qty: 30 TABLET | Refills: 3 | Status: CANCELLED | OUTPATIENT
Start: 2018-04-17 | End: 2019-04-17

## 2018-04-27 ENCOUNTER — OFFICE VISIT (OUTPATIENT)
Dept: FAMILY MEDICINE CLINIC | Age: 33
End: 2018-04-27
Payer: COMMERCIAL

## 2018-04-27 VITALS
WEIGHT: 174 LBS | DIASTOLIC BLOOD PRESSURE: 76 MMHG | SYSTOLIC BLOOD PRESSURE: 114 MMHG | HEIGHT: 57 IN | RESPIRATION RATE: 20 BRPM | BODY MASS INDEX: 37.54 KG/M2 | HEART RATE: 74 BPM

## 2018-04-27 DIAGNOSIS — S39.012A LUMBAR STRAIN, INITIAL ENCOUNTER: Primary | ICD-10-CM
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
## 2018-04-27 PROCEDURE — 99213 OFFICE O/P EST LOW 20 MIN: CPT | Performed by: PEDIATRICS

## 2018-04-27 RX ORDER — METHYLPREDNISOLONE 4 MG/1
TABLET ORAL
Qty: 1 KIT | Refills: 0 | Status: SHIPPED | OUTPATIENT
Start: 2018-04-27 | End: 2018-05-03

## 2018-04-27 ASSESSMENT — ENCOUNTER SYMPTOMS
GASTROINTESTINAL NEGATIVE: 1
RESPIRATORY NEGATIVE: 1
BACK PAIN: 1

## 2018-04-27 ASSESSMENT — PATIENT HEALTH QUESTIONNAIRE - PHQ9
SUM OF ALL RESPONSES TO PHQ9 QUESTIONS 1 & 2: 1
1. LITTLE INTEREST OR PLEASURE IN DOING THINGS: 0
SUM OF ALL RESPONSES TO PHQ QUESTIONS 1-9: 1
2. FEELING DOWN, DEPRESSED OR HOPELESS: 1

## 2018-05-23 ENCOUNTER — OFFICE VISIT (OUTPATIENT)
Dept: FAMILY MEDICINE CLINIC | Age: 33
End: 2018-05-23
Payer: COMMERCIAL

## 2018-05-23 VITALS
TEMPERATURE: 98.4 F | WEIGHT: 177 LBS | BODY MASS INDEX: 38.29 KG/M2 | SYSTOLIC BLOOD PRESSURE: 121 MMHG | DIASTOLIC BLOOD PRESSURE: 80 MMHG | RESPIRATION RATE: 20 BRPM

## 2018-05-23 DIAGNOSIS — J45.20 MILD INTERMITTENT ASTHMA WITHOUT COMPLICATION: Primary | ICD-10-CM

## 2018-05-23 DIAGNOSIS — R49.0 HOARSENESS OF VOICE: ICD-10-CM

## 2018-05-23 PROCEDURE — 99214 OFFICE O/P EST MOD 30 MIN: CPT | Performed by: PEDIATRICS

## 2018-05-23 RX ORDER — ALBUTEROL SULFATE 90 UG/1
2 AEROSOL, METERED RESPIRATORY (INHALATION) EVERY 6 HOURS PRN
Qty: 1 INHALER | Refills: 0 | Status: SHIPPED | OUTPATIENT
Start: 2018-05-23 | End: 2018-10-26 | Stop reason: SDUPTHER

## 2018-05-23 RX ORDER — PREDNISONE 20 MG/1
20 TABLET ORAL DAILY
Qty: 5 TABLET | Refills: 0 | Status: SHIPPED | OUTPATIENT
Start: 2018-05-23 | End: 2018-05-28

## 2018-05-23 ASSESSMENT — ENCOUNTER SYMPTOMS
HEARTBURN: 0
WHEEZING: 0
COUGH: 1
SINUS PRESSURE: 0
NAUSEA: 0
ABDOMINAL PAIN: 0
HEMOPTYSIS: 0
STRIDOR: 0
DIARRHEA: 0
VOMITING: 0
RHINORRHEA: 0
CONSTIPATION: 0
VOICE CHANGE: 1
COLOR CHANGE: 0
CHEST TIGHTNESS: 1

## 2018-05-25 ASSESSMENT — ENCOUNTER SYMPTOMS
EYE REDNESS: 0
SHORTNESS OF BREATH: 0
SORE THROAT: 1
EYE PAIN: 0
EYE DISCHARGE: 0
PHOTOPHOBIA: 0

## 2018-07-27 ENCOUNTER — E-VISIT (OUTPATIENT)
Dept: FAMILY MEDICINE CLINIC | Age: 33
End: 2018-07-27
Payer: COMMERCIAL

## 2018-07-27 DIAGNOSIS — K57.92 DIVERTICULITIS OF INTESTINE WITHOUT BLEEDING, UNSPECIFIED COMPLICATION STATUS, UNSPECIFIED PART OF INTESTINAL TRACT: Primary | ICD-10-CM

## 2018-07-27 PROCEDURE — 98969 PR NONPHYSICIAN ONLINE ASSESSMENT AND MANAGEMENT: CPT | Performed by: NURSE PRACTITIONER

## 2018-07-27 RX ORDER — IBUPROFEN 800 MG/1
800 TABLET ORAL EVERY 8 HOURS PRN
Qty: 90 TABLET | Refills: 0 | Status: SHIPPED | OUTPATIENT
Start: 2018-07-27 | End: 2021-12-13

## 2018-07-27 RX ORDER — AMOXICILLIN AND CLAVULANATE POTASSIUM 875; 125 MG/1; MG/1
1 TABLET, FILM COATED ORAL 2 TIMES DAILY
Qty: 14 TABLET | Refills: 0 | Status: SHIPPED | OUTPATIENT
Start: 2018-07-27 | End: 2019-01-31 | Stop reason: SDUPTHER

## 2018-08-21 RX ORDER — VALACYCLOVIR HYDROCHLORIDE 1 G/1
1000 TABLET, FILM COATED ORAL DAILY
Qty: 30 TABLET | Refills: 5 | Status: SHIPPED | OUTPATIENT
Start: 2018-08-21 | End: 2018-10-26 | Stop reason: DRUGHIGH

## 2018-09-06 NOTE — PROGRESS NOTES
Start taking Tamsulosin at 9 PM daily,  start Medrol Dose pack, Rx given   After completing Medrol dose pack start ibuprofen (200 mg)  3 tablets twice with food for 7- 10 days.    edema.        Right wrist: She exhibits decreased range of motion, tenderness, bony tenderness, swelling and effusion. She exhibits no crepitus and no deformity. Arms:       Hands:  Neurological: She is alert and oriented to person, place, and time. Skin: Skin is warm. No rash noted. She is not diaphoretic. Psychiatric: She has a normal mood and affect. Her behavior is normal. Thought content normal.   Nursing note and vitals reviewed. Assessment:      1. Right wrist pain  XR WRIST RIGHT (2 VIEWS)   2. Chronic pain of right thumb  XR HAND RIGHT (MIN 3 VIEWS)           Plan:      Proceed with obtain xray of the right wrist and right hand xray  Recommend ice to right wrist and ibuprofen as needed  Consider MRI of the right hand and right wrist if x-ray negative  Consider consultation with hand surgeon  Call with concerns     Kaylah received counseling on the following healthy behaviors: nutrition, exercise and medication adherence  Reviewed prior labs and health maintenance. Continue current medications, diet and exercise. Discussed use, benefit, and side effects of prescribed medications. Barriers to medication compliance addressed. Patient given educational materials - see patient instructions. All patient questions answered. Patient voiced understanding.

## 2018-09-22 DIAGNOSIS — K21.9 GASTROESOPHAGEAL REFLUX DISEASE WITHOUT ESOPHAGITIS: Chronic | ICD-10-CM

## 2018-09-24 RX ORDER — PANTOPRAZOLE SODIUM 40 MG/1
40 TABLET, DELAYED RELEASE ORAL DAILY
Qty: 30 TABLET | Refills: 1 | Status: SHIPPED | OUTPATIENT
Start: 2018-09-24 | End: 2018-10-26 | Stop reason: SDUPTHER

## 2018-10-26 ENCOUNTER — OFFICE VISIT (OUTPATIENT)
Dept: FAMILY MEDICINE CLINIC | Age: 33
End: 2018-10-26
Payer: COMMERCIAL

## 2018-10-26 VITALS
HEART RATE: 88 BPM | RESPIRATION RATE: 17 BRPM | TEMPERATURE: 96.3 F | SYSTOLIC BLOOD PRESSURE: 108 MMHG | WEIGHT: 180 LBS | BODY MASS INDEX: 38.94 KG/M2 | DIASTOLIC BLOOD PRESSURE: 76 MMHG

## 2018-10-26 DIAGNOSIS — J45.20 MILD INTERMITTENT ASTHMA WITHOUT COMPLICATION: Primary | ICD-10-CM

## 2018-10-26 DIAGNOSIS — G43.109 MIGRAINE WITH AURA AND WITHOUT STATUS MIGRAINOSUS, NOT INTRACTABLE: ICD-10-CM

## 2018-10-26 DIAGNOSIS — K21.9 GASTROESOPHAGEAL REFLUX DISEASE WITHOUT ESOPHAGITIS: Chronic | ICD-10-CM

## 2018-10-26 DIAGNOSIS — M67.431 GANGLION CYST OF WRIST, RIGHT: ICD-10-CM

## 2018-10-26 DIAGNOSIS — B00.9 HERPES SIMPLEX: ICD-10-CM

## 2018-10-26 PROCEDURE — 99214 OFFICE O/P EST MOD 30 MIN: CPT | Performed by: NURSE PRACTITIONER

## 2018-10-26 RX ORDER — ALBUTEROL SULFATE 90 UG/1
2 AEROSOL, METERED RESPIRATORY (INHALATION) EVERY 6 HOURS PRN
Qty: 1 INHALER | Refills: 1 | Status: SHIPPED | OUTPATIENT
Start: 2018-10-26 | End: 2019-12-04 | Stop reason: ALTCHOICE

## 2018-10-26 RX ORDER — VALACYCLOVIR HYDROCHLORIDE 500 MG/1
500 TABLET, FILM COATED ORAL DAILY
Qty: 30 TABLET | Refills: 1 | Status: SHIPPED | OUTPATIENT
Start: 2018-10-26 | End: 2018-12-24 | Stop reason: SDUPTHER

## 2018-10-26 RX ORDER — PANTOPRAZOLE SODIUM 40 MG/1
40 TABLET, DELAYED RELEASE ORAL DAILY
Qty: 30 TABLET | Refills: 5 | Status: SHIPPED | OUTPATIENT
Start: 2018-10-26 | End: 2019-05-31 | Stop reason: SDUPTHER

## 2018-10-26 ASSESSMENT — ENCOUNTER SYMPTOMS
BACK PAIN: 0
CONSTIPATION: 0
VOMITING: 0
EYE PAIN: 0
COUGH: 1
EYE ITCHING: 1
NAUSEA: 0
SORE THROAT: 1
DIARRHEA: 0
SINUS PRESSURE: 1
SHORTNESS OF BREATH: 0
RHINORRHEA: 1
WHEEZING: 1
ABDOMINAL PAIN: 0
EYE DISCHARGE: 0

## 2018-10-26 NOTE — PROGRESS NOTES
Take 1 capsule by mouth daily 28 capsule      No current facility-administered medications for this visit. Patient ID: Rosemary Neely is a 35 y.o. female. Patient presents in office today for routine follow up on chronic conditions. GERD well controlled on Protonix. Couple times per month she may have symptoms and will need Tums. Usually depends on what she eats or if she drinks chantal. Migraines doing a lot better since Topamax dose increased. Also given nasal spray which is amazing for migraine pain. Hands and feet intermittently go numb. If she sits in position for prolonged period, they will go numb. Unsure if circulation problem. Do not turn purple or blue. Arms and legs not effected, just hands and feet. Told she had carpal tunnel when seen by surgeon for her ganglion cyst on right side. Told to try a brace. Review of Systems   Constitutional: Negative for activity change, appetite change and fever. HENT: Positive for congestion, ear pain (popping), postnasal drip, rhinorrhea, sinus pressure and sore throat. Eyes: Positive for itching. Negative for pain, discharge and visual disturbance. Respiratory: Positive for cough and wheezing (with acute illness). Negative for shortness of breath. Cardiovascular: Negative for chest pain. Gastrointestinal: Negative for abdominal pain, constipation, diarrhea, nausea and vomiting. Genitourinary: Negative for dysuria and genital sores. Musculoskeletal: Negative for back pain, gait problem and joint swelling. Skin: Negative for rash. Neurological: Negative for dizziness and light-headedness. Psychiatric/Behavioral: Negative for sleep disturbance.        PHQ Scores 4/27/2018 2/28/2017   PHQ2 Score 1 0   PHQ9 Score 1 0     Interpretation of Total Score Depression Severity: 1-4 = Minimal depression, 5-9 = Mild depression,10-14 = Moderate depression, 15-19 = Moderately severe depression, 20-27 = Severe depression      Objective:     BP

## 2018-12-24 DIAGNOSIS — B00.9 HERPES SIMPLEX: ICD-10-CM

## 2018-12-26 RX ORDER — VALACYCLOVIR HYDROCHLORIDE 500 MG/1
500 TABLET, FILM COATED ORAL DAILY
Qty: 30 TABLET | Refills: 3 | Status: SHIPPED | OUTPATIENT
Start: 2018-12-26 | End: 2019-04-29 | Stop reason: SDUPTHER

## 2019-01-31 ENCOUNTER — E-VISIT (OUTPATIENT)
Dept: FAMILY MEDICINE CLINIC | Age: 34
End: 2019-01-31
Payer: COMMERCIAL

## 2019-01-31 DIAGNOSIS — J01.40 ACUTE NON-RECURRENT PANSINUSITIS: Primary | ICD-10-CM

## 2019-01-31 PROCEDURE — 99444 PR PHYSICIAN ONLINE EVALUATION & MANAGEMENT SERVICE: CPT | Performed by: PEDIATRICS

## 2019-01-31 RX ORDER — AMOXICILLIN AND CLAVULANATE POTASSIUM 875; 125 MG/1; MG/1
1 TABLET, FILM COATED ORAL 2 TIMES DAILY
Qty: 20 TABLET | Refills: 0 | Status: SHIPPED | OUTPATIENT
Start: 2019-01-31 | End: 2019-02-10

## 2019-01-31 ASSESSMENT — LIFESTYLE VARIABLES: SMOKING_STATUS: NO, I'VE NEVER SMOKED

## 2019-04-22 ENCOUNTER — HOSPITAL ENCOUNTER (OUTPATIENT)
Dept: CT IMAGING | Facility: CLINIC | Age: 34
Discharge: HOME | End: 2019-04-24
Payer: COMMERCIAL

## 2019-04-22 ENCOUNTER — HOSPITAL ENCOUNTER (OUTPATIENT)
Age: 34
Discharge: HOME | End: 2019-04-22
Payer: COMMERCIAL

## 2019-04-22 ENCOUNTER — OFFICE VISIT (OUTPATIENT)
Dept: FAMILY MEDICINE CLINIC | Age: 34
End: 2019-04-22
Payer: COMMERCIAL

## 2019-04-22 VITALS
WEIGHT: 166 LBS | SYSTOLIC BLOOD PRESSURE: 116 MMHG | DIASTOLIC BLOOD PRESSURE: 70 MMHG | BODY MASS INDEX: 35.91 KG/M2 | HEART RATE: 80 BPM | TEMPERATURE: 97.8 F

## 2019-04-22 DIAGNOSIS — K57.92 ACUTE DIVERTICULITIS: Primary | ICD-10-CM

## 2019-04-22 DIAGNOSIS — R10.84 GENERALIZED ABDOMINAL PAIN: ICD-10-CM

## 2019-04-22 DIAGNOSIS — R19.7 DIARRHEA, UNSPECIFIED TYPE: ICD-10-CM

## 2019-04-22 DIAGNOSIS — Z87.19 HISTORY OF DIVERTICULITIS: ICD-10-CM

## 2019-04-22 DIAGNOSIS — R11.0 NAUSEA: ICD-10-CM

## 2019-04-22 LAB
-: NORMAL
ABSOLUTE EOS #: 0.03 K/UL (ref 0–0.44)
ABSOLUTE IMMATURE GRANULOCYTE: 0.07 K/UL (ref 0–0.3)
ABSOLUTE LYMPH #: 1.17 K/UL (ref 1.1–3.7)
ABSOLUTE MONO #: 0.92 K/UL (ref 0.1–1.2)
ALBUMIN SERPL-MCNC: 4.5 G/DL (ref 3.5–5.2)
ALBUMIN/GLOBULIN RATIO: 1.4 (ref 1–2.5)
ALP BLD-CCNC: 119 U/L (ref 35–104)
ALT SERPL-CCNC: 24 U/L (ref 5–33)
AMORPHOUS: NORMAL
AMYLASE: 40 U/L (ref 28–100)
ANION GAP SERPL CALCULATED.3IONS-SCNC: 12 MMOL/L (ref 9–17)
AST SERPL-CCNC: 35 U/L
BACTERIA: NORMAL
BASOPHILS # BLD: 0 % (ref 0–2)
BASOPHILS ABSOLUTE: 0.03 K/UL (ref 0–0.2)
BILIRUB SERPL-MCNC: 0.93 MG/DL (ref 0.3–1.2)
BILIRUBIN URINE: ABNORMAL
BUN BLDV-MCNC: 7 MG/DL (ref 6–20)
BUN/CREAT BLD: ABNORMAL (ref 9–20)
CALCIUM SERPL-MCNC: 9.6 MG/DL (ref 8.6–10.4)
CASTS UA: NORMAL /LPF (ref 0–8)
CHLORIDE BLD-SCNC: 104 MMOL/L (ref 98–107)
CO2: 19 MMOL/L (ref 20–31)
COLOR: ABNORMAL
COMMENT UA: ABNORMAL
CREAT SERPL-MCNC: 0.64 MG/DL (ref 0.5–0.9)
CRYSTALS, UA: NORMAL /HPF
DIFFERENTIAL TYPE: ABNORMAL
EOSINOPHILS RELATIVE PERCENT: 0 % (ref 1–4)
EPITHELIAL CELLS UA: NORMAL /HPF (ref 0–5)
GFR AFRICAN AMERICAN: >60 ML/MIN
GFR NON-AFRICAN AMERICAN: >60 ML/MIN
GFR SERPL CREATININE-BSD FRML MDRD: ABNORMAL ML/MIN/{1.73_M2}
GFR SERPL CREATININE-BSD FRML MDRD: ABNORMAL ML/MIN/{1.73_M2}
GLUCOSE BLD-MCNC: 113 MG/DL (ref 70–99)
GLUCOSE URINE: NEGATIVE
HCT VFR BLD CALC: 41.1 % (ref 36.3–47.1)
HEMOGLOBIN: 13.6 G/DL (ref 11.9–15.1)
IMMATURE GRANULOCYTES: 1 %
KETONES, URINE: NEGATIVE
LEUKOCYTE ESTERASE, URINE: NEGATIVE
LIPASE: 17 U/L (ref 13–60)
LYMPHOCYTES # BLD: 8 % (ref 24–43)
MCH RBC QN AUTO: 28.4 PG (ref 25.2–33.5)
MCHC RBC AUTO-ENTMCNC: 33.1 G/DL (ref 28.4–34.8)
MCV RBC AUTO: 85.8 FL (ref 82.6–102.9)
MONOCYTES # BLD: 6 % (ref 3–12)
MUCUS: NORMAL
NITRITE, URINE: NEGATIVE
NRBC AUTOMATED: 0 PER 100 WBC
OTHER OBSERVATIONS UA: NORMAL
PDW BLD-RTO: 13 % (ref 11.8–14.4)
PH UA: 7.5 (ref 5–8)
PLATELET # BLD: 317 K/UL (ref 138–453)
PLATELET ESTIMATE: ABNORMAL
PMV BLD AUTO: 9.7 FL (ref 8.1–13.5)
POTASSIUM SERPL-SCNC: 4.3 MMOL/L (ref 3.7–5.3)
PROTEIN UA: NEGATIVE
RBC # BLD: 4.79 M/UL (ref 3.95–5.11)
RBC # BLD: ABNORMAL 10*6/UL
RBC UA: NORMAL /HPF (ref 0–4)
RENAL EPITHELIAL, UA: NORMAL /HPF
SEG NEUTROPHILS: 85 % (ref 36–65)
SEGMENTED NEUTROPHILS ABSOLUTE COUNT: 12.5 K/UL (ref 1.5–8.1)
SODIUM BLD-SCNC: 135 MMOL/L (ref 135–144)
SPECIFIC GRAVITY UA: 1.02 (ref 1–1.03)
TOTAL PROTEIN: 7.8 G/DL (ref 6.4–8.3)
TRICHOMONAS: NORMAL
TURBIDITY: ABNORMAL
URINE HGB: NEGATIVE
UROBILINOGEN, URINE: NORMAL
WBC # BLD: 14.7 K/UL (ref 3.5–11.3)
WBC # BLD: ABNORMAL 10*3/UL
WBC UA: NORMAL /HPF (ref 0–5)
YEAST: NORMAL

## 2019-04-22 PROCEDURE — 85025 COMPLETE CBC W/AUTO DIFF WBC: CPT

## 2019-04-22 PROCEDURE — 74177 CT ABD & PELVIS W/CONTRAST: CPT

## 2019-04-22 PROCEDURE — 83690 ASSAY OF LIPASE: CPT

## 2019-04-22 PROCEDURE — 82150 ASSAY OF AMYLASE: CPT

## 2019-04-22 PROCEDURE — 36415 COLL VENOUS BLD VENIPUNCTURE: CPT

## 2019-04-22 PROCEDURE — 80053 COMPREHEN METABOLIC PANEL: CPT

## 2019-04-22 PROCEDURE — 99214 OFFICE O/P EST MOD 30 MIN: CPT | Performed by: PEDIATRICS

## 2019-04-22 PROCEDURE — 2580000003 HC RX 258: Performed by: PEDIATRICS

## 2019-04-22 PROCEDURE — 81001 URINALYSIS AUTO W/SCOPE: CPT

## 2019-04-22 PROCEDURE — 6360000004 HC RX CONTRAST MEDICATION: Performed by: PEDIATRICS

## 2019-04-22 RX ORDER — CIPROFLOXACIN 500 MG/1
500 TABLET, FILM COATED ORAL 2 TIMES DAILY
Qty: 20 TABLET | Refills: 0 | Status: SHIPPED | OUTPATIENT
Start: 2019-04-22 | End: 2019-05-02

## 2019-04-22 RX ORDER — ONDANSETRON 4 MG/1
4-8 TABLET, ORALLY DISINTEGRATING ORAL EVERY 8 HOURS PRN
Qty: 60 TABLET | Refills: 0 | Status: SHIPPED | OUTPATIENT
Start: 2019-04-22 | End: 2019-05-02

## 2019-04-22 RX ORDER — HYDROCODONE BITARTRATE AND ACETAMINOPHEN 5; 325 MG/1; MG/1
1 TABLET ORAL EVERY 4 HOURS PRN
Qty: 42 TABLET | Refills: 0 | Status: SHIPPED | OUTPATIENT
Start: 2019-04-22 | End: 2019-04-29

## 2019-04-22 RX ORDER — METRONIDAZOLE 500 MG/1
500 TABLET ORAL 2 TIMES DAILY
Qty: 20 TABLET | Refills: 0 | Status: SHIPPED | OUTPATIENT
Start: 2019-04-22 | End: 2019-05-02

## 2019-04-22 RX ORDER — 0.9 % SODIUM CHLORIDE 0.9 %
80 INTRAVENOUS SOLUTION INTRAVENOUS ONCE
Status: COMPLETED | OUTPATIENT
Start: 2019-04-22 | End: 2019-04-22

## 2019-04-22 RX ORDER — SODIUM CHLORIDE 0.9 % (FLUSH) 0.9 %
10 SYRINGE (ML) INJECTION PRN
Status: DISCONTINUED | OUTPATIENT
Start: 2019-04-22 | End: 2019-04-25 | Stop reason: HOSPADM

## 2019-04-22 RX ADMIN — IOHEXOL 50 ML: 240 INJECTION, SOLUTION INTRATHECAL; INTRAVASCULAR; INTRAVENOUS; ORAL at 16:03

## 2019-04-22 RX ADMIN — SODIUM CHLORIDE 80 ML: 9 INJECTION, SOLUTION INTRAVENOUS at 16:04

## 2019-04-22 RX ADMIN — IOPAMIDOL 75 ML: 755 INJECTION, SOLUTION INTRAVENOUS at 16:03

## 2019-04-22 RX ADMIN — Medication 10 ML: at 16:04

## 2019-04-22 ASSESSMENT — ENCOUNTER SYMPTOMS
ABDOMINAL PAIN: 1
PHOTOPHOBIA: 0
BELCHING: 0
HEMATOCHEZIA: 0
CONSTIPATION: 0
FLATUS: 0
WHEEZING: 0
ABDOMINAL DISTENTION: 1
CHEST TIGHTNESS: 0
BLOOD IN STOOL: 1
SHORTNESS OF BREATH: 0
VOMITING: 0
EYE PAIN: 0
COUGH: 0
EYE REDNESS: 0
EYE DISCHARGE: 0
BACK PAIN: 1
NAUSEA: 1
STRIDOR: 0
DIARRHEA: 1
COLOR CHANGE: 0
SINUS PAIN: 0
CHOKING: 0
RHINORRHEA: 0

## 2019-04-22 ASSESSMENT — PATIENT HEALTH QUESTIONNAIRE - PHQ9
2. FEELING DOWN, DEPRESSED OR HOPELESS: 0
1. LITTLE INTEREST OR PLEASURE IN DOING THINGS: 0
SUM OF ALL RESPONSES TO PHQ QUESTIONS 1-9: 0
SUM OF ALL RESPONSES TO PHQ QUESTIONS 1-9: 0
SUM OF ALL RESPONSES TO PHQ9 QUESTIONS 1 & 2: 0

## 2019-04-22 NOTE — PROGRESS NOTES
Subjective:      Patient ID: Nathanael Oh is a 35 y.o. female. Visit Information    Have you changed or started any medications since your last visit including any over-the-counter medicines, vitamins, or herbal medicines? no   Are you having any side effects from any of your medications? -  no  Have you stopped taking any of your medications? Is so, why? -  no    Have you seen any other physician or provider since your last visit? No  Have you had any other diagnostic tests since your last visit? No  Have you been seen in the emergency room and/or had an admission to a hospital since we last saw you? No  Have you had your routine dental cleaning in the past 6 months? no    Have you activated your [employer] account? If not, what are your barriers?  Yes     Patient Care Team:  Hi Bush MD as PCP - General (Internal Medicine/Pediatrics)    Medical History Review  Past Medical, Family, and Social History reviewed and does contribute to the patient presenting condition    Health Maintenance   Topic Date Due    Pneumococcal 0-64 years Vaccine (1 of 1 - PPSV23) 04/28/1991    Varicella Vaccine (1 of 2 - 13+ 2-dose series) 04/28/1998    Cervical cancer screen  12/09/2017    Flu vaccine (Season Ended) 09/01/2019    DTaP/Tdap/Td vaccine (2 - Td) 07/09/2025    HIV screen  Completed       PHQ Scores 4/22/2019 4/27/2018 2/28/2017   PHQ2 Score 0 1 0   PHQ9 Score 0 1 0     Interpretation of Total Score DepressionSeverity: 1-4 = Minimal depression, 5-9 = Mild depression, 10-14 = Moderate depression, 15-19 = Moderately severe depression, 20-27 = Severe depression    Current Outpatient Medications   Medication Sig Dispense Refill    ciprofloxacin (CIPRO) 500 MG tablet Take 1 tablet by mouth 2 times daily for 10 days 20 tablet 0    metroNIDAZOLE (FLAGYL) 500 MG tablet Take 1 tablet by mouth 2 times daily for 10 days 20 tablet 0    ondansetron (ZOFRAN ODT) 4 MG disintegrating tablet Take 1-2 tablets by mouth every 8 hours as needed for Nausea or Vomiting 60 tablet 0    HYDROcodone-acetaminophen (NORCO) 5-325 MG per tablet Take 1 tablet by mouth every 4 hours as needed for Pain for up to 7 days. Intended supply: 7 days. Take lowest dose possible to manage pain 42 tablet 0    valACYclovir (VALTREX) 500 MG tablet Take 1 tablet by mouth daily 30 tablet 3    pantoprazole (PROTONIX) 40 MG tablet Take 1 tablet by mouth daily 30 tablet 5    Norethin Ace-Eth Estrad-FE 1-20 MG-MCG(24) CAPS Take 1 capsule by mouth daily 28 capsule     topiramate (TOPAMAX) 25 MG tablet Take 1 tablet by mouth 2 times daily (Patient taking differently: Take 100 mg by mouth 2 times daily ) 60 tablet 5    albuterol sulfate HFA (PROAIR HFA) 108 (90 Base) MCG/ACT inhaler Inhale 2 puffs into the lungs every 6 hours as needed for Wheezing or Shortness of Breath 1 Inhaler 1    ibuprofen (ADVIL;MOTRIN) 800 MG tablet Take 1 tablet by mouth every 8 hours as needed for Pain 90 tablet 0     No current facility-administered medications for this visit. Facility-Administered Medications Ordered in Other Visits   Medication Dose Route Frequency Provider Last Rate Last Dose    sodium chloride flush 0.9 % injection 10 mL  10 mL Intravenous PRN Nikko Rincon MD   10 mL at 04/22/19 1604       HPI:      Patient presents today for evaluation of abdominal pain that started last evening. Patient states that last week she had eaten corn and did develop some diarrhea for several days. She states that this actually resolved and she was feeling well until yesterday when she felt as though she needed to have a bowel movement but was unable to. She stated that she did have some mushrooms and then developed several bouts of diarrhea that started at approximately 2:00 yesterday afternoon. She did eat dinner which consisted of a hamburger and potatoes and once again she did have symptoms of feeling as though she had to have a bowel movement.   She then developed several episodes of diarrhea again. She then had right lower back pain when she was kneeling down to play game with her daughter. She felt as though this could have been some back spasms as she does get these sometimes. She did take a hot bath at approximately 10 PM to help with this. She thought maybe this was related to her hormones and menstrual cycles. She then developed worsening cramping from the back that radiated around to her abdomen and down into the pelvis. She did have some oxycodone at home and because she did not want to go to the urgent care she took one of these at 1 AM and 1 at 7 PM.  She states that she does feel abdominal bloating and nausea. She has not had any vomiting. She has not had any fevers. She has had some cold sweats today. She has had 2 previous episodes of diverticulitis. One was approximately 7 years ago and was diagnosed with CT scan. This required a 4 day hospitalization for treatment. The other was approximately 9 months ago. This was treated with antibiotics through an e- visit. cmw        Abdominal Pain   This is a recurrent problem. The current episode started more than 1 month ago. The problem occurs constantly. The problem has been unchanged. The pain is located in the generalized abdominal region. The pain is at a severity of 10/10. The pain is severe. The quality of the pain is cramping, a sensation of fullness and sharp. The abdominal pain radiates to the back and epigastric region. Associated symptoms include diarrhea and nausea. Pertinent negatives include no anorexia, arthralgias, belching, constipation, dysuria, fever, flatus, frequency, headaches, hematochezia, hematuria, melena, myalgias, vomiting or weight loss. Nothing (warm bath) aggravates the pain. The pain is relieved by nothing. She has tried antacids and oral narcotic analgesics (warm bath) for the symptoms. The treatment provided no relief.        Review of Systems   Constitutional: Positive stridor. No respiratory distress. She has no wheezes. She has no rales. She exhibits no tenderness. Abdominal: Soft. She exhibits no shifting dullness and no abdominal bruit. There is no hepatosplenomegaly. There is generalized tenderness and tenderness in the left lower quadrant. There is rebound and guarding. There is no rigidity, no CVA tenderness, no tenderness at McBurney's point and negative Zhao's sign. Musculoskeletal: She exhibits no edema. Lymphadenopathy:     She has no cervical adenopathy. Neurological: She is alert and oriented to person, place, and time. Skin: Skin is warm. Capillary refill takes less than 2 seconds. No rash noted. She is not diaphoretic. No erythema. Psychiatric: She has a normal mood and affect. Nursing note and vitals reviewed. Assessment:      Diagnosis Orders   1. Acute diverticulitis  ciprofloxacin (CIPRO) 500 MG tablet    metroNIDAZOLE (FLAGYL) 500 MG tablet    HYDROcodone-acetaminophen (NORCO) 5-325 MG per tablet   2. Generalized abdominal pain  CT ABDOMEN PELVIS W IV CONTRAST Additional Contrast? Radiologist Recommendation    CBC With Auto Differential    Comprehensive Metabolic Panel    Urinalysis Reflex to Culture    Amylase    Lipase    ciprofloxacin (CIPRO) 500 MG tablet    metroNIDAZOLE (FLAGYL) 500 MG tablet   3. Diarrhea, unspecified type     4. History of diverticulitis  CT ABDOMEN PELVIS W IV CONTRAST Additional Contrast? Radiologist Recommendation   5.  Nausea  ondansetron (ZOFRAN ODT) 4 MG disintegrating tablet       Plan:     Proceed with obtain STAT CT scan of the abdomen and pelvis with IV constrast   Proceed with obtain STAT labs  Recommend start cipro and flagyl as prescribed after review of CT scan   Advise zofran for nausea  Norco as needed for pain   Increase fluids to ensure adequate hydration   Clear fluids x 48 hours and then advance diet as tolerated - small amounts of soft food frequently   Consider hospitalization if CT shows perf /

## 2019-04-29 ENCOUNTER — OFFICE VISIT (OUTPATIENT)
Dept: FAMILY MEDICINE CLINIC | Age: 34
End: 2019-04-29
Payer: COMMERCIAL

## 2019-04-29 VITALS
DIASTOLIC BLOOD PRESSURE: 76 MMHG | TEMPERATURE: 98.2 F | WEIGHT: 170 LBS | BODY MASS INDEX: 36.78 KG/M2 | SYSTOLIC BLOOD PRESSURE: 118 MMHG | RESPIRATION RATE: 18 BRPM

## 2019-04-29 DIAGNOSIS — K42.9 PERIUMBILICAL HERNIA: ICD-10-CM

## 2019-04-29 DIAGNOSIS — K57.32 DIVERTICULITIS OF COLON: Primary | ICD-10-CM

## 2019-04-29 DIAGNOSIS — N20.0 CALCULUS OF LEFT KIDNEY: ICD-10-CM

## 2019-04-29 DIAGNOSIS — B00.9 HERPES SIMPLEX: ICD-10-CM

## 2019-04-29 PROCEDURE — 99213 OFFICE O/P EST LOW 20 MIN: CPT | Performed by: PEDIATRICS

## 2019-04-29 RX ORDER — VALACYCLOVIR HYDROCHLORIDE 500 MG/1
500 TABLET, FILM COATED ORAL DAILY
Qty: 30 TABLET | Refills: 5 | Status: SHIPPED | OUTPATIENT
Start: 2019-04-29 | End: 2019-11-13 | Stop reason: SDUPTHER

## 2019-04-29 ASSESSMENT — ENCOUNTER SYMPTOMS
EYE REDNESS: 0
SINUS PAIN: 0
ABDOMINAL PAIN: 1
SHORTNESS OF BREATH: 0
COLOR CHANGE: 0
EYE PAIN: 0
EYE DISCHARGE: 0
CHEST TIGHTNESS: 0
PHOTOPHOBIA: 0
VOMITING: 0
RHINORRHEA: 0
STRIDOR: 0
ABDOMINAL DISTENTION: 0
DIARRHEA: 1
BLOOD IN STOOL: 0
COUGH: 0
CONSTIPATION: 0
CHOKING: 0
BACK PAIN: 0
NAUSEA: 0
WHEEZING: 0

## 2019-04-29 NOTE — PROGRESS NOTES
Subjective:      Patient ID: Jacque Simental is a 29 y.o. female. Visit Information    Have you changed or started any medications since your last visit including any over-the-counter medicines, vitamins, or herbal medicines? no   Are you having any side effects from any of your medications? -  no  Have you stopped taking any of your medications? Is so, why? -  no    Have you seen any other physician or provider since your last visit? No  Have you had any other diagnostic tests since your last visit? No  Have you been seen in the emergency room and/or had an admission to a hospital since we last saw you? Yes - Records Requested  Have you had your routine dental cleaning in the past 6 months? Have you activated your [employer] account? If not, what are your barriers?  Yes     Patient Care Team:  Federico Woodward MD as PCP - General (Internal Medicine/Pediatrics)    Medical History Review  Past Medical, Family, and Social History reviewed and does contribute to the patient presenting condition    Health Maintenance   Topic Date Due    Pneumococcal 0-64 years Vaccine (1 of 1 - PPSV23) 04/28/1991    Varicella Vaccine (1 of 2 - 13+ 2-dose series) 04/28/1998    Cervical cancer screen  12/09/2017    Flu vaccine (Season Ended) 09/01/2019    DTaP/Tdap/Td vaccine (2 - Td) 07/09/2025    HIV screen  Completed       PHQ Scores 4/22/2019 4/27/2018 2/28/2017   PHQ2 Score 0 1 0   PHQ9 Score 0 1 0     Interpretation of Total Score DepressionSeverity: 1-4 = Minimal depression, 5-9 = Mild depression, 10-14 = Moderate depression, 15-19 = Moderately severe depression, 20-27 = Severe depression    Current Outpatient Medications   Medication Sig Dispense Refill    valACYclovir (VALTREX) 500 MG tablet Take 1 tablet by mouth daily 30 tablet 5    ciprofloxacin (CIPRO) 500 MG tablet Take 1 tablet by mouth 2 times daily for 10 days 20 tablet 0    metroNIDAZOLE (FLAGYL) 500 MG tablet Take 1 tablet by mouth 2 times daily for 10 days 20 tablet 0    ondansetron (ZOFRAN ODT) 4 MG disintegrating tablet Take 1-2 tablets by mouth every 8 hours as needed for Nausea or Vomiting 60 tablet 0    HYDROcodone-acetaminophen (NORCO) 5-325 MG per tablet Take 1 tablet by mouth every 4 hours as needed for Pain for up to 7 days. Intended supply: 7 days. Take lowest dose possible to manage pain 42 tablet 0    albuterol sulfate HFA (PROAIR HFA) 108 (90 Base) MCG/ACT inhaler Inhale 2 puffs into the lungs every 6 hours as needed for Wheezing or Shortness of Breath 1 Inhaler 1    pantoprazole (PROTONIX) 40 MG tablet Take 1 tablet by mouth daily 30 tablet 5    ibuprofen (ADVIL;MOTRIN) 800 MG tablet Take 1 tablet by mouth every 8 hours as needed for Pain 90 tablet 0    Norethin Ace-Eth Estrad-FE 1-20 MG-MCG(24) CAPS Take 1 capsule by mouth daily 28 capsule     topiramate (TOPAMAX) 25 MG tablet Take 1 tablet by mouth 2 times daily (Patient taking differently: Take 100 mg by mouth 2 times daily ) 60 tablet 5     No current facility-administered medications for this visit. Patient presents to office for her follow up on the Diverticulitis , she states her sxs have resolved and she feels a lot better. HPI     Patient presents today for routine follow-up of acute diverticulitis. Patient was seen last week with for evaluation of abdominal pain, abdominal bloating and diarrhea that started the evening prior to her appointment. She was quite nauseated but denied any vomiting. She did not have any fever. She was sent for CT scan of the abdomen and pelvis which did show pericolonic fat stranding and wall thickening in association with the junction of the descending and sigmoid colon. These findings were most compatible with acute diverticulitis however focal colitis could be considered. There was a small amount of free fluid in the pelvis and a small midline fat containing periumbilical hernia.   She did also have nonobstructing left-sided renal calculi without hydronephrosis. She was started on Cipro and Flagyl and she does continue on this. She states that her symptoms have dramatically improved from last week. She does complain of only mild abdominal discomfort in the absence of fever and bloody stools. She denies any nausea, vomiting or constipation. She has been having some loose stools and this does seem to be getting better as well. She does have a family history of Crohn's in a maternal cousin and possibly an uncle. She has had several episodes of diverticulitis - 2 of which were documented on CT scan. I did advise her that she will require consultation with a surgeon for evaluation of recurrent diverticulitis and for follow-up colonoscopy. She states she did have a colonoscopy many years ago but has not had any testing since then. She has been tolerating a regular diet and has been increasing her fluids. She does request a refill of Valtrex that she uses for suppression of genital herpes. cmw      Review of Systems   Constitutional: Positive for appetite change (appetite improved. Now tolerating a regular diet). Negative for chills and fever. HENT: Negative for congestion, nosebleeds, rhinorrhea and sinus pain. Eyes: Negative for photophobia, pain, discharge and redness. Respiratory: Negative for cough, choking, chest tightness, shortness of breath, wheezing and stridor. Cardiovascular: Negative for chest pain, palpitations and leg swelling. Gastrointestinal: Positive for abdominal pain (only very slight discomfort) and diarrhea (loose stools improving). Negative for abdominal distention, blood in stool, constipation, nausea and vomiting. Endocrine: Negative for polydipsia, polyphagia and polyuria. Genitourinary: Negative for decreased urine volume, dysuria, frequency, hematuria and urgency. Request refill of valtrex for suppression of herpes   Musculoskeletal: Negative for arthralgias, back pain and myalgias.    Skin: Negative for color change and rash. Allergic/Immunologic: Negative for immunocompromised state. Neurological: Negative for headaches. Hematological: Negative for adenopathy. Objective:     /76 (Site: Right Upper Arm, Position: Sitting, Cuff Size: Medium Adult)   Temp 98.2 °F (36.8 °C) (Tympanic)   Resp 18   Wt 170 lb (77.1 kg)   Breastfeeding? No   BMI 36.78 kg/m²        Physical Exam   Constitutional: She is oriented to person, place, and time. She appears well-developed and well-nourished. No distress. HENT:   Head: Normocephalic. Right Ear: External ear normal.   Left Ear: External ear normal.   Nose: Nose normal.   Mouth/Throat: Oropharynx is clear and moist. No oropharyngeal exudate. Eyes: Pupils are equal, round, and reactive to light. Conjunctivae and EOM are normal. Right eye exhibits no discharge. Left eye exhibits no discharge. Neck: Normal range of motion. Neck supple. No thyromegaly present. Cardiovascular: Normal rate, regular rhythm and normal heart sounds. No murmur heard. Pulmonary/Chest: Effort normal and breath sounds normal. No stridor. No respiratory distress. She has no wheezes. She has no rales. She exhibits no tenderness. Abdominal: Soft. Bowel sounds are normal. She exhibits no shifting dullness and no abdominal bruit. There is no hepatosplenomegaly. There is tenderness in the right lower quadrant and left lower quadrant. There is no rigidity, no rebound, no guarding, no CVA tenderness, no tenderness at McBurney's point and negative Zhao's sign. Musculoskeletal: She exhibits no edema. Lymphadenopathy:     She has no cervical adenopathy. Neurological: She is alert and oriented to person, place, and time. Skin: Skin is warm. Capillary refill takes less than 2 seconds. No rash noted. She is not diaphoretic. No erythema. Psychiatric: She has a normal mood and affect. Nursing note and vitals reviewed. Assessment:      Diagnosis Orders   1. Diverticulitis of colon  MAT Perkins MD, Colorectal Surgery, [place]   2. Periumbilical hernia  MAT Perkins MD, Colorectal Surgery, [place]   3. Calculus of left kidney     4. Herpes simplex  valACYclovir (VALTREX) 500 MG tablet       Plan:     Proceed with continue Cipro and Flagyl as prescribed until course is complete  Regular diet as tolerated  Increase fluids to ensure adequate hydration  Referral to Dr. Lynn Houston for evaluation of recurrent diverticulitis and lauryn-umbilical hernia  Reassurance that small stones in the left kidney are not worrisome  Valtrex as prescribed  Call with concerns      Kaylah received counseling on the following healthy behaviors: nutrition, exercise and medication adherence  Reviewed prior labs and health maintenance. Continue current medications, diet and exercise. Discussed use, benefit, and side effects of prescribed medications. Barriers to medication compliance addressed. Patient given educational materials - see patient instructions. All patient questions answered. Patient voiced understanding.          Electronically signed by Carloz Sandoval MD on 4/29/2019 at 1:52 PM

## 2019-05-31 DIAGNOSIS — K21.9 GASTROESOPHAGEAL REFLUX DISEASE WITHOUT ESOPHAGITIS: Chronic | ICD-10-CM

## 2019-05-31 RX ORDER — PANTOPRAZOLE SODIUM 40 MG/1
40 TABLET, DELAYED RELEASE ORAL DAILY
Qty: 30 TABLET | Refills: 5 | Status: SHIPPED | OUTPATIENT
Start: 2019-05-31 | End: 2019-12-23

## 2019-05-31 NOTE — TELEPHONE ENCOUNTER
LOV:1-31-20  ROV:unspecified  YLS:[birth year]  Health Maintenance   Topic Date Due    Pneumococcal 0-64 years Vaccine (1 of 1 - PPSV23) 04/28/1991    Varicella Vaccine (1 of 2 - 13+ 2-dose series) 04/28/1998    Cervical cancer screen  12/09/2017    Flu vaccine (Season Ended) 09/01/2019    DTaP/Tdap/Td vaccine (2 - Td) 07/09/2025    HIV screen  Completed             (applicable per patient's age: Cancer Screenings, Depression Screening, Fall Risk Screening, Immunizations)    Hemoglobin A1C (%)   Date Value   10/26/2012 4.6     LDL Cholesterol (mg/dL)   Date Value   03/01/2017 116     AST (U/L)   Date Value   04/22/2019 35 (H)     ALT (U/L)   Date Value   04/22/2019 24     BUN (mg/dL)   Date Value   04/22/2019 7      (goal A1C is < 7)   (goal LDL is <100) need 30-50% reduction from baseline     BP Readings from Last 3 Encounters:   04/29/19 118/76   04/22/19 116/70   10/26/18 108/76    (goal /80)      All Future Testing planned in [facility]:      Next Visit Date:  No future appointments.          Patient Active Problem List:     GERD (gastroesophageal reflux disease)     MTHFR (methylene THF reductase) deficiency and homocystinuria (HCC)     Migraine with aura and without status migrainosus, not intractable     Herpes simplex

## 2019-11-13 DIAGNOSIS — B00.9 HERPES SIMPLEX: ICD-10-CM

## 2019-11-13 RX ORDER — VALACYCLOVIR HYDROCHLORIDE 500 MG/1
500 TABLET, FILM COATED ORAL DAILY
Qty: 30 TABLET | Refills: 5 | Status: SHIPPED | OUTPATIENT
Start: 2019-11-13 | End: 2020-05-11

## 2019-12-04 ENCOUNTER — OFFICE VISIT (OUTPATIENT)
Dept: FAMILY MEDICINE CLINIC | Age: 34
End: 2019-12-04
Payer: COMMERCIAL

## 2019-12-04 VITALS
HEART RATE: 77 BPM | DIASTOLIC BLOOD PRESSURE: 74 MMHG | TEMPERATURE: 97.5 F | SYSTOLIC BLOOD PRESSURE: 110 MMHG | OXYGEN SATURATION: 98 % | RESPIRATION RATE: 16 BRPM | HEIGHT: 58 IN | WEIGHT: 170 LBS | BODY MASS INDEX: 35.68 KG/M2

## 2019-12-04 DIAGNOSIS — R23.4 BREAST SKIN CHANGES: ICD-10-CM

## 2019-12-04 DIAGNOSIS — N64.4 PAIN OF BOTH BREASTS: Primary | ICD-10-CM

## 2019-12-04 DIAGNOSIS — F41.9 ANXIETY: ICD-10-CM

## 2019-12-04 DIAGNOSIS — I73.00 RAYNAUD'S PHENOMENON WITHOUT GANGRENE: ICD-10-CM

## 2019-12-04 PROCEDURE — 1036F TOBACCO NON-USER: CPT | Performed by: NURSE PRACTITIONER

## 2019-12-04 PROCEDURE — 99214 OFFICE O/P EST MOD 30 MIN: CPT | Performed by: NURSE PRACTITIONER

## 2019-12-04 PROCEDURE — G8484 FLU IMMUNIZE NO ADMIN: HCPCS | Performed by: NURSE PRACTITIONER

## 2019-12-04 PROCEDURE — G8417 CALC BMI ABV UP PARAM F/U: HCPCS | Performed by: NURSE PRACTITIONER

## 2019-12-04 PROCEDURE — G8427 DOCREV CUR MEDS BY ELIG CLIN: HCPCS | Performed by: NURSE PRACTITIONER

## 2019-12-04 RX ORDER — AMLODIPINE BESYLATE 5 MG/1
5 TABLET ORAL DAILY
Qty: 30 TABLET | Refills: 3 | Status: SHIPPED | OUTPATIENT
Start: 2019-12-04 | End: 2020-04-27

## 2019-12-04 RX ORDER — HYDROXYZINE HYDROCHLORIDE 10 MG/1
10 TABLET, FILM COATED ORAL EVERY 8 HOURS PRN
Qty: 30 TABLET | Refills: 0 | Status: SHIPPED | OUTPATIENT
Start: 2019-12-04 | End: 2020-01-03

## 2019-12-04 ASSESSMENT — ENCOUNTER SYMPTOMS
ABDOMINAL DISTENTION: 0
EYE DISCHARGE: 0
APNEA: 0
ALLERGIC/IMMUNOLOGIC NEGATIVE: 1
COLOR CHANGE: 1

## 2019-12-21 DIAGNOSIS — K21.9 GASTROESOPHAGEAL REFLUX DISEASE WITHOUT ESOPHAGITIS: Chronic | ICD-10-CM

## 2019-12-23 RX ORDER — PANTOPRAZOLE SODIUM 40 MG/1
40 TABLET, DELAYED RELEASE ORAL DAILY
Qty: 30 TABLET | Refills: 5 | Status: SHIPPED | OUTPATIENT
Start: 2019-12-23 | End: 2020-07-02

## 2020-01-02 ENCOUNTER — OFFICE VISIT (OUTPATIENT)
Dept: FAMILY MEDICINE CLINIC | Age: 35
End: 2020-01-02
Payer: COMMERCIAL

## 2020-01-02 VITALS
HEART RATE: 72 BPM | TEMPERATURE: 97.9 F | DIASTOLIC BLOOD PRESSURE: 66 MMHG | BODY MASS INDEX: 35.74 KG/M2 | SYSTOLIC BLOOD PRESSURE: 114 MMHG | WEIGHT: 171 LBS | RESPIRATION RATE: 18 BRPM

## 2020-01-02 PROCEDURE — 99214 OFFICE O/P EST MOD 30 MIN: CPT | Performed by: PEDIATRICS

## 2020-01-02 PROCEDURE — G8484 FLU IMMUNIZE NO ADMIN: HCPCS | Performed by: PEDIATRICS

## 2020-01-02 PROCEDURE — 1036F TOBACCO NON-USER: CPT | Performed by: PEDIATRICS

## 2020-01-02 PROCEDURE — G8417 CALC BMI ABV UP PARAM F/U: HCPCS | Performed by: PEDIATRICS

## 2020-01-02 PROCEDURE — G8427 DOCREV CUR MEDS BY ELIG CLIN: HCPCS | Performed by: PEDIATRICS

## 2020-01-02 PROCEDURE — G0444 DEPRESSION SCREEN ANNUAL: HCPCS | Performed by: PEDIATRICS

## 2020-01-02 ASSESSMENT — PATIENT HEALTH QUESTIONNAIRE - PHQ9
5. POOR APPETITE OR OVEREATING: 0
SUM OF ALL RESPONSES TO PHQ9 QUESTIONS 1 & 2: 4
SUM OF ALL RESPONSES TO PHQ QUESTIONS 1-9: 10
6. FEELING BAD ABOUT YOURSELF - OR THAT YOU ARE A FAILURE OR HAVE LET YOURSELF OR YOUR FAMILY DOWN: 1
8. MOVING OR SPEAKING SO SLOWLY THAT OTHER PEOPLE COULD HAVE NOTICED. OR THE OPPOSITE, BEING SO FIGETY OR RESTLESS THAT YOU HAVE BEEN MOVING AROUND A LOT MORE THAN USUAL: 0
9. THOUGHTS THAT YOU WOULD BE BETTER OFF DEAD, OR OF HURTING YOURSELF: 0
2. FEELING DOWN, DEPRESSED OR HOPELESS: 2
3. TROUBLE FALLING OR STAYING ASLEEP: 2
SUM OF ALL RESPONSES TO PHQ QUESTIONS 1-9: 10
10. IF YOU CHECKED OFF ANY PROBLEMS, HOW DIFFICULT HAVE THESE PROBLEMS MADE IT FOR YOU TO DO YOUR WORK, TAKE CARE OF THINGS AT HOME, OR GET ALONG WITH OTHER PEOPLE: 0
1. LITTLE INTEREST OR PLEASURE IN DOING THINGS: 2
7. TROUBLE CONCENTRATING ON THINGS, SUCH AS READING THE NEWSPAPER OR WATCHING TELEVISION: 1
4. FEELING TIRED OR HAVING LITTLE ENERGY: 2

## 2020-01-02 ASSESSMENT — ENCOUNTER SYMPTOMS
COLOR CHANGE: 0
NAUSEA: 1
SCALP TENDERNESS: 1
RHINORRHEA: 0
WHEEZING: 0
SWOLLEN GLANDS: 0
PHOTOPHOBIA: 0
SINUS PRESSURE: 0
SHORTNESS OF BREATH: 0
ABDOMINAL PAIN: 0
FACIAL SWEATING: 0
EYE PAIN: 0
BACK PAIN: 0
VOMITING: 0
EYE WATERING: 0
BLURRED VISION: 0
SORE THROAT: 0
VISUAL CHANGE: 0
EYE REDNESS: 0
STRIDOR: 0
CHEST TIGHTNESS: 0
COUGH: 0

## 2020-01-02 NOTE — PROGRESS NOTES
have an MRI of the brain in 2017 which showed some abnormal areas but she was told by her neurologist that these were from her migraine headaches and were not worrisome. She was placed on Topamax 100 mg twice daily. She does have more headaches since starting Norvasc for possible raynauds syndrome. She does get a lot of muscle cramping and muscle twitching especially in the shoulders. She does have a history of anxiety and does feels that this is been worse lately. cmw        Migraine    This is a chronic problem. The current episode started more than 1 year ago. The problem occurs daily. The problem has been waxing and waning. The pain is located in the bilateral region. The pain does not radiate. The pain quality is similar to prior headaches. The quality of the pain is described as aching, throbbing and pulsating. The pain is at a severity of 8/10. The pain is severe. Associated symptoms include insomnia, nausea, numbness and scalp tenderness. Pertinent negatives include no abdominal pain, abnormal behavior, anorexia, back pain, blurred vision, coughing, dizziness, drainage, ear pain, eye pain, eye redness, eye watering, facial sweating, fever, hearing loss, loss of balance, muscle aches, neck pain, phonophobia, photophobia, rhinorrhea, seizures, sinus pressure, sore throat, swollen glands, tingling, tinnitus, visual change, vomiting, weakness or weight loss. Nothing aggravates the symptoms. She has tried darkened room, Excedrin, ketorolac injections and NSAIDs for the symptoms. The treatment provided no relief. Review of Systems   Constitutional: Positive for fatigue. Negative for appetite change, fever, unexpected weight change and weight loss. HENT: Negative for congestion, ear pain, hearing loss, rhinorrhea, sinus pressure, sore throat and tinnitus. Eyes: Positive for visual disturbance. Negative for blurred vision, photophobia, pain and redness.    Respiratory: Negative for cough, chest tightness, shortness of breath, wheezing and stridor. Cardiovascular: Negative for chest pain, palpitations and leg swelling. Gastrointestinal: Positive for nausea. Negative for abdominal pain, anorexia, constipation, diarrhea and vomiting. Endocrine: Negative for polydipsia, polyphagia and polyuria. Genitourinary: Negative for decreased urine volume, dysuria, hematuria and urgency. Musculoskeletal: Positive for myalgias (muscle twitching). Negative for back pain and neck pain. Skin: Negative for color change and rash. Allergic/Immunologic: Negative for immunocompromised state. Neurological: Positive for numbness and headaches. Negative for dizziness, tingling, tremors, seizures, weakness, light-headedness and loss of balance. Hematological: Negative for adenopathy. Does not bruise/bleed easily. Psychiatric/Behavioral: Positive for sleep disturbance (not sleeping well because of itching / pins and needles). Negative for dysphoric mood. The patient is nervous/anxious and has insomnia. Objective:     /66 (Site: Right Upper Arm, Position: Sitting, Cuff Size: Medium Adult)   Pulse 72   Temp 97.9 °F (36.6 °C) (Tympanic)   Resp 18   Wt 171 lb (77.6 kg)   Breastfeeding? No   BMI 35.74 kg/m²        Physical Exam  Vitals signs and nursing note reviewed. Constitutional:       General: She is not in acute distress. Appearance: Normal appearance. She is well-developed. HENT:      Head: Normocephalic. Right Ear: External ear normal.      Left Ear: External ear normal.   Eyes:      General: Lids are normal.      Conjunctiva/sclera: Conjunctivae normal.      Pupils: Pupils are equal, round, and reactive to light. Neck:      Musculoskeletal: Normal range of motion. Trachea: Trachea normal.   Cardiovascular:      Rate and Rhythm: Normal rate and regular rhythm. Pulses:           Carotid pulses are 2+ on the right side and 2+ on the left side.        Radial pulses are 2+ on the right side and 2+ on the left side. Femoral pulses are 2+ on the right side and 2+ on the left side. Heart sounds: Normal heart sounds. Heart sounds not distant. Pulmonary:      Effort: Pulmonary effort is normal. No accessory muscle usage or respiratory distress. Breath sounds: Normal breath sounds. No decreased breath sounds or wheezing. Abdominal:      General: Bowel sounds are normal.      Tenderness: There is no tenderness. Musculoskeletal: Normal range of motion. Comments: No red or swollen joints. Lymphadenopathy:      Cervical: No cervical adenopathy. Skin:     General: Skin is warm. Findings: No rash. Neurological:      Mental Status: She is alert and oriented to person, place, and time. Cranial Nerves: No cranial nerve deficit. Sensory: No sensory deficit. Psychiatric:         Speech: Speech normal.         Behavior: Behavior normal. Behavior is cooperative. Judgment: Judgment normal.         Assessment:      Diagnosis Orders   1. Pins and needles sensation  MRI BRAIN W WO CONTRAST    TSH without Reflex    Comprehensive Metabolic Panel    CBC With Auto Differential    Vitamin B12    Hemoglobin A1C    Vitamin D 25 Hydroxy    EVELYN Screen with Reflex   2. Other migraine without status migrainosus, not intractable  MRI BRAIN W WO CONTRAST   3. Vision changes  MRI BRAIN W WO CONTRAST   4. Muscle cramping  MRI BRAIN W WO CONTRAST   5. Anxiety     6.  Muscle twitching  MRI BRAIN W WO CONTRAST    TSH without Reflex    Comprehensive Metabolic Panel    CBC With Auto Differential    Vitamin B12    Hemoglobin A1C    Vitamin D 25 Hydroxy    EVELYN Screen with Reflex       Plan:     Proceed with obtain labs as ordered   Obtain MRI of the brain as ordered  Follow-up with neurology as scheduled  Regular exercise and healthy diet encouraged  Good sleep hygiene recommended  Call with concerns or worsening symptoms        Kaylah received counseling on the following healthy behaviors: nutrition, exercise and medication adherence  Reviewed prior labs and health maintenance. Continue current medications, diet and exercise. Discussed use, benefit, and side effects of prescribed medications. Barriers to medication compliance addressed. Patient given educational materials - see patient instructions. All patient questions answered. Patient voiced understanding.        Electronically signed by Funmi Carter MD on 1/5/2020 at 6:58 PM

## 2020-01-03 ENCOUNTER — HOSPITAL ENCOUNTER (OUTPATIENT)
Age: 35
Setting detail: SPECIMEN
Discharge: HOME | End: 2020-01-03
Payer: COMMERCIAL

## 2020-01-03 LAB
ABSOLUTE EOS #: 0.2 K/UL (ref 0–0.44)
ABSOLUTE IMMATURE GRANULOCYTE: 0.03 K/UL (ref 0–0.3)
ABSOLUTE LYMPH #: 2.17 K/UL (ref 1.1–3.7)
ABSOLUTE MONO #: 0.43 K/UL (ref 0.1–1.2)
ALBUMIN SERPL-MCNC: 4.3 G/DL (ref 3.5–5.2)
ALBUMIN/GLOBULIN RATIO: 1.4 (ref 1–2.5)
ALP BLD-CCNC: 78 U/L (ref 35–104)
ALT SERPL-CCNC: 6 U/L (ref 5–33)
ANION GAP SERPL CALCULATED.3IONS-SCNC: 11 MMOL/L (ref 9–17)
AST SERPL-CCNC: 10 U/L
BASOPHILS # BLD: 1 % (ref 0–2)
BASOPHILS ABSOLUTE: 0.06 K/UL (ref 0–0.2)
BILIRUB SERPL-MCNC: 0.29 MG/DL (ref 0.3–1.2)
BUN BLDV-MCNC: 9 MG/DL (ref 6–20)
BUN/CREAT BLD: ABNORMAL (ref 9–20)
CALCIUM SERPL-MCNC: 9.1 MG/DL (ref 8.6–10.4)
CHLORIDE BLD-SCNC: 105 MMOL/L (ref 98–107)
CO2: 19 MMOL/L (ref 20–31)
CREAT SERPL-MCNC: 0.65 MG/DL (ref 0.5–0.9)
DIFFERENTIAL TYPE: ABNORMAL
EOSINOPHILS RELATIVE PERCENT: 2 % (ref 1–4)
ESTIMATED AVERAGE GLUCOSE: 105 MG/DL
GFR AFRICAN AMERICAN: >60 ML/MIN
GFR NON-AFRICAN AMERICAN: >60 ML/MIN
GFR SERPL CREATININE-BSD FRML MDRD: ABNORMAL ML/MIN/{1.73_M2}
GFR SERPL CREATININE-BSD FRML MDRD: ABNORMAL ML/MIN/{1.73_M2}
GLUCOSE BLD-MCNC: 98 MG/DL (ref 70–99)
HBA1C MFR BLD: 5.3 % (ref 4–6)
HCT VFR BLD CALC: 40.1 % (ref 36.3–47.1)
HEMOGLOBIN: 13.7 G/DL (ref 11.9–15.1)
IMMATURE GRANULOCYTES: 0 %
LYMPHOCYTES # BLD: 26 % (ref 24–43)
MCH RBC QN AUTO: 29.3 PG (ref 25.2–33.5)
MCHC RBC AUTO-ENTMCNC: 34.2 G/DL (ref 28.4–34.8)
MCV RBC AUTO: 85.7 FL (ref 82.6–102.9)
MONOCYTES # BLD: 5 % (ref 3–12)
NRBC AUTOMATED: 0 PER 100 WBC
PDW BLD-RTO: 12.5 % (ref 11.8–14.4)
PLATELET # BLD: 342 K/UL (ref 138–453)
PLATELET ESTIMATE: ABNORMAL
PMV BLD AUTO: 9.4 FL (ref 8.1–13.5)
POTASSIUM SERPL-SCNC: 3.7 MMOL/L (ref 3.7–5.3)
RBC # BLD: 4.68 M/UL (ref 3.95–5.11)
RBC # BLD: ABNORMAL 10*6/UL
SEG NEUTROPHILS: 66 % (ref 36–65)
SEGMENTED NEUTROPHILS ABSOLUTE COUNT: 5.63 K/UL (ref 1.5–8.1)
SODIUM BLD-SCNC: 135 MMOL/L (ref 135–144)
TOTAL PROTEIN: 7.3 G/DL (ref 6.4–8.3)
TSH SERPL DL<=0.05 MIU/L-ACNC: 1.94 MIU/L (ref 0.3–5)
VITAMIN B-12: 258 PG/ML (ref 232–1245)
VITAMIN D 25-HYDROXY: 41 NG/ML (ref 30–100)
WBC # BLD: 8.5 K/UL (ref 3.5–11.3)
WBC # BLD: ABNORMAL 10*3/UL

## 2020-01-05 ASSESSMENT — ENCOUNTER SYMPTOMS
DIARRHEA: 0
CONSTIPATION: 0

## 2020-01-06 LAB — ANTI-NUCLEAR ANTIBODY (ANA): NEGATIVE

## 2020-04-27 RX ORDER — AMLODIPINE BESYLATE 5 MG/1
5 TABLET ORAL DAILY
Qty: 30 TABLET | Refills: 5 | Status: SHIPPED | OUTPATIENT
Start: 2020-04-27 | End: 2020-10-27

## 2020-06-22 RX ORDER — VALACYCLOVIR HYDROCHLORIDE 500 MG/1
500 TABLET, FILM COATED ORAL 2 TIMES DAILY
Qty: 30 TABLET | Refills: 5 | Status: SHIPPED | OUTPATIENT
Start: 2020-06-22 | End: 2021-12-28 | Stop reason: SDUPTHER

## 2020-07-02 RX ORDER — PANTOPRAZOLE SODIUM 40 MG/1
40 TABLET, DELAYED RELEASE ORAL DAILY
Qty: 30 TABLET | Refills: 5 | Status: SHIPPED | OUTPATIENT
Start: 2020-07-02 | End: 2020-12-01

## 2020-10-27 RX ORDER — AMLODIPINE BESYLATE 5 MG/1
TABLET ORAL
Qty: 30 TABLET | Refills: 4 | Status: SHIPPED | OUTPATIENT
Start: 2020-10-27 | End: 2021-04-05

## 2020-10-27 NOTE — TELEPHONE ENCOUNTER
Last visit: 01/02/20  Last Med refill: 4/27/20      Next Visit Date:  No future appointments.     Health Maintenance   Topic Date Due    Flu vaccine (1) 09/01/2020    Cervical cancer screen  07/22/2022    DTaP/Tdap/Td vaccine (2 - Td) 07/09/2025    HIV screen  Completed    Hepatitis A vaccine  Aged Out    Hepatitis B vaccine  Aged Out    Hib vaccine  Aged Out    Meningococcal (ACWY) vaccine  Aged Out    Pneumococcal 0-64 years Vaccine  Aged Out    Varicella vaccine  Discontinued       Hemoglobin A1C (%)   Date Value   01/03/2020 5.3   10/26/2012 4.6             ( goal A1C is < 7)   No results found for: LABMICR  LDL Cholesterol (mg/dL)   Date Value   03/01/2017 116   07/14/2015 130       (goal LDL is <100)   AST (U/L)   Date Value   01/03/2020 10     ALT (U/L)   Date Value   01/03/2020 6     BUN (mg/dL)   Date Value   01/03/2020 9     BP Readings from Last 3 Encounters:   01/02/20 114/66   12/04/19 110/74   04/29/19 118/76          (goal 120/80)    All Future Testing planned in [facility]  Lab Frequency Next Occurrence   US BREAST COMPLETE RIGHT Once 12/04/2019   US BREAST COMPLETE LEFT Once 12/04/2019               Patient Active Problem List:     GERD (gastroesophageal reflux disease)     MTHFR (methylene THF reductase) deficiency and homocystinuria (HCC)     Migraine with aura and without status migrainosus, not intractable     Herpes simplex

## 2020-12-01 RX ORDER — PANTOPRAZOLE SODIUM 40 MG/1
40 TABLET, DELAYED RELEASE ORAL DAILY
Qty: 30 TABLET | Refills: 5 | Status: SHIPPED | OUTPATIENT
Start: 2020-12-01 | End: 2021-06-01

## 2020-12-01 NOTE — TELEPHONE ENCOUNTER
Last visit: 1/2/2020  Last Med refill: 7/2/20  Does patient have enough medication for 72 hours: Yes    Next Visit Date:  No future appointments.     Health Maintenance   Topic Date Due    Flu vaccine (1) 09/01/2020    Cervical cancer screen  07/22/2022    DTaP/Tdap/Td vaccine (2 - Td) 07/09/2025    HIV screen  Completed    Hepatitis A vaccine  Aged Out    Hepatitis B vaccine  Aged Out    Hib vaccine  Aged Out    Meningococcal (ACWY) vaccine  Aged Out    Pneumococcal 0-64 years Vaccine  Aged Out    Varicella vaccine  Discontinued       Hemoglobin A1C (%)   Date Value   01/03/2020 5.3   10/26/2012 4.6             ( goal A1C is < 7)   No results found for: LABMICR  LDL Cholesterol (mg/dL)   Date Value   03/01/2017 116   07/14/2015 130       (goal LDL is <100)   AST (U/L)   Date Value   01/03/2020 10     ALT (U/L)   Date Value   01/03/2020 6     BUN (mg/dL)   Date Value   01/03/2020 9     BP Readings from Last 3 Encounters:   01/02/20 114/66   12/04/19 110/74   04/29/19 118/76          (goal 120/80)    All Future Testing planned in [facility]  Lab Frequency Next Occurrence   US BREAST COMPLETE RIGHT Once 12/04/2019   US BREAST COMPLETE LEFT Once 12/04/2019               Patient Active Problem List:     GERD (gastroesophageal reflux disease)     MTHFR (methylene THF reductase) deficiency and homocystinuria (HCC)     Migraine with aura and without status migrainosus, not intractable     Herpes simplex
The patient is a 2y8m Male complaining of lacerations.

## 2020-12-15 ENCOUNTER — OFFICE VISIT (OUTPATIENT)
Dept: PRIMARY CARE CLINIC | Age: 35
End: 2020-12-15
Payer: COMMERCIAL

## 2020-12-15 VITALS
SYSTOLIC BLOOD PRESSURE: 104 MMHG | TEMPERATURE: 96.7 F | DIASTOLIC BLOOD PRESSURE: 80 MMHG | HEIGHT: 57 IN | BODY MASS INDEX: 40.34 KG/M2 | WEIGHT: 187 LBS | HEART RATE: 95 BPM | OXYGEN SATURATION: 98 % | RESPIRATION RATE: 16 BRPM

## 2020-12-15 PROCEDURE — 1036F TOBACCO NON-USER: CPT | Performed by: PHYSICIAN ASSISTANT

## 2020-12-15 PROCEDURE — G8484 FLU IMMUNIZE NO ADMIN: HCPCS | Performed by: PHYSICIAN ASSISTANT

## 2020-12-15 PROCEDURE — 99213 OFFICE O/P EST LOW 20 MIN: CPT | Performed by: PHYSICIAN ASSISTANT

## 2020-12-15 PROCEDURE — G8427 DOCREV CUR MEDS BY ELIG CLIN: HCPCS | Performed by: PHYSICIAN ASSISTANT

## 2020-12-15 PROCEDURE — G8417 CALC BMI ABV UP PARAM F/U: HCPCS | Performed by: PHYSICIAN ASSISTANT

## 2020-12-15 RX ORDER — FEXOFENADINE HCL AND PSEUDOEPHEDRINE HCI 180; 240 MG/1; MG/1
1 TABLET, EXTENDED RELEASE ORAL DAILY
Qty: 7 TABLET | Refills: 0 | Status: SHIPPED | OUTPATIENT
Start: 2020-12-15 | End: 2022-04-27

## 2020-12-15 ASSESSMENT — ENCOUNTER SYMPTOMS
NAUSEA: 0
RHINORRHEA: 0
SHORTNESS OF BREATH: 0
VOMITING: 0
BACK PAIN: 0
CONSTIPATION: 0
DIARRHEA: 0
SINUS PAIN: 0
COUGH: 0
ABDOMINAL PAIN: 0

## 2020-12-15 NOTE — PROGRESS NOTES
[facility]  [facility]  [address]  [address] [address]  Dept: [phone]  Dept Fax: [phone]    Natanael Melgar is a 28 y.o. female who presents today for her medical conditions/complaints as noted below. Chief Complaint   Patient presents with    Ear Fullness     pressure of lt ear, no pain, hearing crackling noise, noticing decreased hearing       HPI:     Patient presents to office as walk-in for evaluation of left ear issues. She describes fullness/pressure in left ear predominantly. There is \"crackling\" noises in bilateral ears. This has caused intermittent issues with hearing changes. Symptoms have been on and off for past few months, but becoming more frequent. Denies nasal congestion, dizziness, chest pain, shortness of breath, sore throat, rhinorrhea, feeling off balance. Has history of recurrent ear infections and ear tubes when a child. She does clean ears with Q-tips. No recent medication/treatment for ear. Ear Fullness   There is pain in the left ear. This is a chronic problem. The current episode started more than 1 month ago. The problem has been waxing and waning. There has been no fever. The patient is experiencing no pain. Associated symptoms include hearing loss. Pertinent negatives include no abdominal pain, coughing, diarrhea, ear discharge, rhinorrhea or vomiting. She has tried nothing for the symptoms. The treatment provided no relief. Her past medical history is significant for hearing loss and a tympanostomy tube.        Hemoglobin A1C (%)   Date Value   01/03/2020 5.3   10/26/2012 4.6             ( goal A1C is < 7)   No results found for: LABMICR  LDL Cholesterol (mg/dL)   Date Value   03/01/2017 116   07/14/2015 130       (goal LDL is <100)   AST (U/L)   Date Value   01/03/2020 10     ALT (U/L)   Date Value   01/03/2020 6     BUN (mg/dL)   Date Value   01/03/2020 9     BP Readings from Last 3 Encounters:   12/15/20 104/80   01/02/20 114/66   12/04/19 110/74          (goal 120/80)    Past Medical History:   Diagnosis Date    Anxiety     Bicornuate uterus     GERD (gastroesophageal reflux disease)     Miscarriage     6 total      Past Surgical History:   Procedure Laterality Date    BREAST REDUCTION SURGERY  10/2016    TUBAL LIGATION  10/2015       Family History   Problem Relation Age of Onset    Depression Mother     Anxiety Disorder Mother     Elevated Lipids Mother      Problems Mother     Kidney Disease Mother         CKD    Other Mother         GERD    Other Father         gallstones    Diabetes Maternal Grandmother        Social History     Tobacco Use    Smoking status: Never Smoker    Smokeless tobacco: Never Used   Substance Use Topics    Alcohol use: No     Alcohol/week: 0.0 standard drinks      Current Outpatient Medications   Medication Sig Dispense Refill    fexofenadine-pseudoephedrine (ALLEGRA-D 24HR) 180-240 MG per extended release tablet Take 1 tablet by mouth daily 7 tablet 0    pantoprazole (PROTONIX) 40 MG tablet Take 1 tablet by mouth daily 30 tablet 5    amLODIPine (NORVASC) 5 MG tablet TAKE ONE TABLET BY MOUTH DAILY 30 tablet 4    valACYclovir (VALTREX) 500 MG tablet Take 1 tablet by mouth 2 times daily 30 tablet 5    ibuprofen (ADVIL;MOTRIN) 800 MG tablet Take 1 tablet by mouth every 8 hours as needed for Pain 90 tablet 0    Norethin Ace-Eth Estrad-FE 1-20 MG-MCG(24) CAPS Take 1 capsule by mouth daily 28 capsule     topiramate (TOPAMAX) 25 MG tablet Take 1 tablet by mouth 2 times daily (Patient taking differently: Take 100 mg by mouth 2 times daily ) 60 tablet 5     No current facility-administered medications for this visit.       No Known Allergies    Health Maintenance   Topic Date Due    Flu vaccine (1) 09/01/2020    Cervical cancer screen  07/22/2022    DTaP/Tdap/Td vaccine (2 - Td) 07/09/2025    HIV screen  Completed    Hepatitis A vaccine  Aged Out    Hepatitis B Reviewed health maintenance. Instructed to continue current medications, diet and exercise. Patient agreed with treatment plan. Follow up as directed.         Electronically signed by Rojelio Orozco PA-C on 12/15/2020 at 10:04 AM

## 2021-05-28 ENCOUNTER — E-VISIT (OUTPATIENT)
Dept: FAMILY MEDICINE CLINIC | Age: 36
End: 2021-05-28
Payer: COMMERCIAL

## 2021-05-28 DIAGNOSIS — H10.021 PINK EYE DISEASE OF RIGHT EYE: Primary | ICD-10-CM

## 2021-05-28 PROCEDURE — 99421 OL DIG E/M SVC 5-10 MIN: CPT | Performed by: PEDIATRICS

## 2021-05-28 RX ORDER — MOXIFLOXACIN 5 MG/ML
1 SOLUTION/ DROPS OPHTHALMIC 3 TIMES DAILY
Qty: 3 ML | Refills: 0 | Status: SHIPPED | OUTPATIENT
Start: 2021-05-28 | End: 2021-06-04

## 2021-06-01 DIAGNOSIS — K21.9 GASTROESOPHAGEAL REFLUX DISEASE WITHOUT ESOPHAGITIS: Chronic | ICD-10-CM

## 2021-06-01 RX ORDER — PANTOPRAZOLE SODIUM 40 MG/1
TABLET, DELAYED RELEASE ORAL
Qty: 30 TABLET | Refills: 5 | Status: SHIPPED | OUTPATIENT
Start: 2021-06-01 | End: 2021-12-20 | Stop reason: SDUPTHER

## 2021-06-01 NOTE — TELEPHONE ENCOUNTER
Last visit: 5/28/21  Last Med refill: 12/1/20    Next Visit Date:  No future appointments.     Health Maintenance   Topic Date Due    Hepatitis C screen  Never done    COVID-19 Vaccine (1) Never done    Flu vaccine (Season Ended) 09/01/2021    Cervical cancer screen  07/22/2022    DTaP/Tdap/Td vaccine (2 - Td) 07/09/2025    HIV screen  Completed    Hepatitis A vaccine  Aged Out    Hepatitis B vaccine  Aged Out    Hib vaccine  Aged Out    Meningococcal (ACWY) vaccine  Aged Out    Pneumococcal 0-64 years Vaccine  Aged Out    Varicella vaccine  Discontinued       Hemoglobin A1C (%)   Date Value   01/03/2020 5.3   10/26/2012 4.6             ( goal A1C is < 7)   No results found for: LABMICR  LDL Cholesterol (mg/dL)   Date Value   03/01/2017 116   07/14/2015 130       (goal LDL is <100)   AST (U/L)   Date Value   01/03/2020 10     ALT (U/L)   Date Value   01/03/2020 6     BUN (mg/dL)   Date Value   01/03/2020 9     BP Readings from Last 3 Encounters:   12/15/20 104/80   01/02/20 114/66   12/04/19 110/74          (goal 120/80)    All Future Testing planned in [facility]              Patient Active Problem List:     GERD (gastroesophageal reflux disease)     MTHFR (methylene THF reductase) deficiency and homocystinuria (HCC)     Migraine with aura and without status migrainosus, not intractable     Herpes simplex

## 2021-07-05 DIAGNOSIS — I73.00 RAYNAUD'S PHENOMENON WITHOUT GANGRENE: ICD-10-CM

## 2021-07-06 RX ORDER — AMLODIPINE BESYLATE 5 MG/1
TABLET ORAL
Qty: 30 TABLET | Refills: 5 | Status: SHIPPED | OUTPATIENT
Start: 2021-07-06 | End: 2022-01-11

## 2021-07-06 NOTE — TELEPHONE ENCOUNTER
Last visit: 5/28/21  Last Med refill: 4/5/21    Next Visit Date:  No future appointments.     Health Maintenance   Topic Date Due    Hepatitis C screen  Never done    COVID-19 Vaccine (1) Never done    Flu vaccine (1) 09/01/2021    Cervical cancer screen  07/22/2022    DTaP/Tdap/Td vaccine (2 - Td or Tdap) 07/09/2025    HIV screen  Completed    Hepatitis A vaccine  Aged Out    Hepatitis B vaccine  Aged Out    Hib vaccine  Aged Out    Meningococcal (ACWY) vaccine  Aged Out    Pneumococcal 0-64 years Vaccine  Aged Out    Varicella vaccine  Discontinued       Hemoglobin A1C (%)   Date Value   01/03/2020 5.3   10/26/2012 4.6             ( goal A1C is < 7)   No results found for: LABMICR  LDL Cholesterol (mg/dL)   Date Value   03/01/2017 116   07/14/2015 130       (goal LDL is <100)   AST (U/L)   Date Value   01/03/2020 10     ALT (U/L)   Date Value   01/03/2020 6     BUN (mg/dL)   Date Value   01/03/2020 9     BP Readings from Last 3 Encounters:   12/15/20 104/80   01/02/20 114/66   12/04/19 110/74          (goal 120/80)    All Future Testing planned in [facility]              Patient Active Problem List:     GERD (gastroesophageal reflux disease)     MTHFR (methylene THF reductase) deficiency and homocystinuria (HCC)     Migraine with aura and without status migrainosus, not intractable     Herpes simplex

## 2021-09-23 ENCOUNTER — TELEMEDICINE (OUTPATIENT)
Dept: FAMILY MEDICINE CLINIC | Age: 36
End: 2021-09-23
Payer: COMMERCIAL

## 2021-09-23 ENCOUNTER — NURSE TRIAGE (OUTPATIENT)
Dept: OTHER | Facility: CLINIC | Age: 36
End: 2021-09-23

## 2021-09-23 DIAGNOSIS — R05.8 POST-VIRAL COUGH SYNDROME: Primary | ICD-10-CM

## 2021-09-23 PROCEDURE — 99213 OFFICE O/P EST LOW 20 MIN: CPT | Performed by: STUDENT IN AN ORGANIZED HEALTH CARE EDUCATION/TRAINING PROGRAM

## 2021-09-23 PROCEDURE — G8417 CALC BMI ABV UP PARAM F/U: HCPCS | Performed by: STUDENT IN AN ORGANIZED HEALTH CARE EDUCATION/TRAINING PROGRAM

## 2021-09-23 PROCEDURE — 1036F TOBACCO NON-USER: CPT | Performed by: STUDENT IN AN ORGANIZED HEALTH CARE EDUCATION/TRAINING PROGRAM

## 2021-09-23 PROCEDURE — G8427 DOCREV CUR MEDS BY ELIG CLIN: HCPCS | Performed by: STUDENT IN AN ORGANIZED HEALTH CARE EDUCATION/TRAINING PROGRAM

## 2021-09-23 RX ORDER — ALBUTEROL SULFATE 90 UG/1
2 AEROSOL, METERED RESPIRATORY (INHALATION) 4 TIMES DAILY PRN
Qty: 54 G | Refills: 1 | Status: SHIPPED | OUTPATIENT
Start: 2021-09-23

## 2021-09-23 RX ORDER — GUAIFENESIN AND CODEINE PHOSPHATE 100; 10 MG/5ML; MG/5ML
5 SOLUTION ORAL EVERY 4 HOURS PRN
Qty: 473 ML | Refills: 1 | Status: SHIPPED | OUTPATIENT
Start: 2021-09-23 | End: 2021-09-26

## 2021-09-23 RX ORDER — BENZONATATE 200 MG/1
200 CAPSULE ORAL 3 TIMES DAILY PRN
Qty: 30 CAPSULE | Refills: 0 | Status: SHIPPED | OUTPATIENT
Start: 2021-09-23 | End: 2021-09-30

## 2021-09-23 SDOH — ECONOMIC STABILITY: TRANSPORTATION INSECURITY
IN THE PAST 12 MONTHS, HAS THE LACK OF TRANSPORTATION KEPT YOU FROM MEDICAL APPOINTMENTS OR FROM GETTING MEDICATIONS?: NO

## 2021-09-23 SDOH — ECONOMIC STABILITY: TRANSPORTATION INSECURITY
IN THE PAST 12 MONTHS, HAS LACK OF TRANSPORTATION KEPT YOU FROM MEETINGS, WORK, OR FROM GETTING THINGS NEEDED FOR DAILY LIVING?: NO

## 2021-09-23 SDOH — ECONOMIC STABILITY: FOOD INSECURITY: WITHIN THE PAST 12 MONTHS, YOU WORRIED THAT YOUR FOOD WOULD RUN OUT BEFORE YOU GOT MONEY TO BUY MORE.: NEVER TRUE

## 2021-09-23 SDOH — ECONOMIC STABILITY: FOOD INSECURITY: WITHIN THE PAST 12 MONTHS, THE FOOD YOU BOUGHT JUST DIDN'T LAST AND YOU DIDN'T HAVE MONEY TO GET MORE.: NEVER TRUE

## 2021-09-23 ASSESSMENT — ENCOUNTER SYMPTOMS
CHEST TIGHTNESS: 0
DIARRHEA: 0
COUGH: 0
BACK PAIN: 0
ABDOMINAL DISTENTION: 0
WHEEZING: 0
SORE THROAT: 0
CONSTIPATION: 0
SHORTNESS OF BREATH: 0
ABDOMINAL PAIN: 0

## 2021-09-23 ASSESSMENT — SOCIAL DETERMINANTS OF HEALTH (SDOH)
WITHIN THE LAST YEAR, HAVE YOU BEEN HUMILIATED OR EMOTIONALLY ABUSED IN OTHER WAYS BY YOUR PARTNER OR EX-PARTNER?: NO
WITHIN THE LAST YEAR, HAVE TO BEEN RAPED OR FORCED TO HAVE ANY KIND OF SEXUAL ACTIVITY BY YOUR PARTNER OR EX-PARTNER?: NO
WITHIN THE LAST YEAR, HAVE YOU BEEN KICKED, HIT, SLAPPED, OR OTHERWISE PHYSICALLY HURT BY YOUR PARTNER OR EX-PARTNER?: NO
HOW HARD IS IT FOR YOU TO PAY FOR THE VERY BASICS LIKE FOOD, HOUSING, MEDICAL CARE, AND HEATING?: NOT HARD AT ALL
WITHIN THE LAST YEAR, HAVE YOU BEEN AFRAID OF YOUR PARTNER OR EX-PARTNER?: NO

## 2021-09-23 ASSESSMENT — PATIENT HEALTH QUESTIONNAIRE - PHQ9
SUM OF ALL RESPONSES TO PHQ QUESTIONS 1-9: 0
2. FEELING DOWN, DEPRESSED OR HOPELESS: 0
SUM OF ALL RESPONSES TO PHQ QUESTIONS 1-9: 0
SUM OF ALL RESPONSES TO PHQ QUESTIONS 1-9: 0
1. LITTLE INTEREST OR PLEASURE IN DOING THINGS: 0
SUM OF ALL RESPONSES TO PHQ9 QUESTIONS 1 & 2: 0

## 2021-09-23 ASSESSMENT — LIFESTYLE VARIABLES: HOW OFTEN DO YOU HAVE A DRINK CONTAINING ALCOHOL: NEVER

## 2021-09-23 NOTE — PROGRESS NOTES
Akira Bocanegra (:  1985) is a 39 y.o. female,Established patient, here for evaluation of the following chief complaint(s): Cough and Other (COVID POSITIVE)         ASSESSMENT/PLAN:  1. Post-viral cough syndrome  -     benzonatate (TESSALON) 200 MG capsule; Take 1 capsule by mouth 3 times daily as needed for Cough, Disp-30 capsule, R-0Normal  -     guaiFENesin-codeine (CHERATUSSIN AC) 100-10 MG/5ML syrup; Take 5 mLs by mouth every 4 hours as needed for Cough for up to 3 days. , Disp-473 mL, R-1Normal  -     albuterol sulfate HFA (VENTOLIN HFA) 108 (90 Base) MCG/ACT inhaler; Inhale 2 puffs into the lungs 4 times daily as needed for Wheezing, Disp-54 g, R-1Normal  -     Spacer/Aero-Holding Chambers GRAHAM; DAILY PRN Starting Thu 2021, Disp-1 each, R-0, Normal    Post viral cough 2/2 covid  No other symptoms - likely convalescent    No follow-ups on file. SUBJECTIVE/OBJECTIVE:  HPI: 39 F recently diagnosed with covid 2 weeks ago    Has persistent cough    Looking for relief    This is a dry cough    No fevers chills or night sweats    Has hx of asthma but not using inhaler for > 15 years    Review of Systems   Constitutional: Negative for chills, fatigue and fever. HENT: Negative for congestion, postnasal drip and sore throat. Eyes: Negative for visual disturbance. Respiratory: Negative for cough, chest tightness, shortness of breath and wheezing. Cardiovascular: Negative. Gastrointestinal: Negative for abdominal distention, abdominal pain, constipation and diarrhea. Genitourinary: Negative for difficulty urinating, dysuria, frequency and urgency. Musculoskeletal: Negative for arthralgias, back pain and joint swelling. Skin: Negative for rash. Neurological: Negative for dizziness, weakness and light-headedness. Psychiatric/Behavioral: Negative for agitation, decreased concentration and sleep disturbance.          Patient-Reported Vitals 2021   Patient-Reported Weight 180 lbs Patient-Reported Height [id number]   Patient-Reported Temperature 98.6        Physical Exam  Vitals and nursing note reviewed. Constitutional:       Appearance: Normal appearance. HENT:      Head: Normocephalic and atraumatic. Eyes:      Extraocular Movements: Extraocular movements intact. Cardiovascular:      Rate and Rhythm: Normal rate and regular rhythm. Pulses: Normal pulses. Heart sounds: Normal heart sounds. Pulmonary:      Effort: Pulmonary effort is normal.      Breath sounds: Normal breath sounds. Abdominal:      General: Abdomen is flat. Palpations: Abdomen is soft. Musculoskeletal:         General: Normal range of motion. Cervical back: Normal range of motion and neck supple. Skin:     General: Skin is warm. Neurological:      General: No focal deficit present. Mental Status: She is alert and oriented to person, place, and time.           [INSTRUCTIONS:  \"[x]\" Indicates a positive item  \"[]\" Indicates a negative item  -- DELETE ALL ITEMS NOT EXAMINED]    Constitutional: [x] Appears well-developed and well-nourished [x] No apparent distress      [] Abnormal -     Mental status: [x] Alert and awake  [x] Oriented to person/place/time [x] Able to follow commands    [] Abnormal -     Eyes:   EOM    [x]  Normal    [] Abnormal -   Sclera  [x]  Normal    [] Abnormal -          Discharge [x]  None visible   [] Abnormal -     HENT: [x] Normocephalic, atraumatic  [] Abnormal -   [x] Mouth/Throat: Mucous membranes are moist    External Ears [x] Normal  [] Abnormal -    Neck: [x] No visualized mass [] Abnormal -     Pulmonary/Chest: [x] Respiratory effort normal   [x] No visualized signs of difficulty breathing or respiratory distress        [] Abnormal -      Musculoskeletal:   [x] Normal gait with no signs of ataxia         [x] Normal range of motion of neck        [] Abnormal -     Neurological:        [x] No Facial Asymmetry (Cranial nerve 7 motor function) (limited exam due to video visit)          [x] No gaze palsy        [] Abnormal -          Skin:        [x] No significant exanthematous lesions or discoloration noted on facial skin         [] Abnormal -            Psychiatric:       [x] Normal Affect [] Abnormal -        [x] No Hallucinations    Other pertinent observable physical exam findings:-              Floresita Mock, was evaluated through a synchronous (real-time) audio-video encounter. The patient (or guardian if applicable) is aware that this is a billable service. Verbal consent to proceed has been obtained within the past 12 months. The visit was conducted pursuant to the emergency declaration under the [address] [place] [address], [address] [place] [address] authority and the [employer] General Act. Patient identification was verified, and a caregiver was present when appropriate. The patient was located in a state where the provider was credentialed to provide care. An electronic signature was used to authenticate this note.     --Dre Singer MD

## 2021-09-23 NOTE — TELEPHONE ENCOUNTER
Received call from Ju Valencia at [facility] with Red Flag Complaint. Brief description of triage: as above is covid positive with cough body aches and fatigue, [place] [facility] [place] some symptoms better but cough worse    Triage indicates for patient to call office for [address] advice provided, patient verbalizes understanding; denies any other questions or concerns; instructed to call back for any new or worsening symptoms. Writer provided warm transfer to [employer] at [facility] for appointment scheduling. Attention Provider: Thank you for allowing me to participate in the care of your patient. The patient was connected to triage in response to information provided to the ECC/PSC. Please do not respond through this encounter as the response is not directed to a shared pool. Reason for Disposition   [1] COVID-19 infection suspected by caller or triager AND [2] mild symptoms (cough, fever, or others) AND [6] no complications or SOB    Answer Assessment - Initial Assessment Questions  1. COVID-19 DIAGNOSIS: \"Who made your Coronavirus (COVID-19) diagnosis? \" \"Was it confirmed by a positive lab test?\" If not diagnosed by a HCP, ask \"Are there lots of cases (community spread) where you live? \" (See public health department website, if unsure)     Tested positive    2. COVID-19 EXPOSURE: \"Was there any known exposure to COVID before the symptoms began? \" CDC Definition of close contact: within 6 feet (2 meters) for a total of 15 minutes or more over a 24-hour period. Unsure    3. ONSET: \"When did the COVID-19 symptoms start? \"       Thursday    4. WORST SYMPTOM: \"What is your worst symptom? \" (e.g., cough, fever, shortness of breath, muscle aches)      Cough dry    5. COUGH: \"Do you have a cough? \" If so, ask: \"How bad is the cough? \"        Mod to severe    6. FEVER: \"Do you have a fever? \" If so, ask: \"What is your temperature, how was it measured, and when did it start? \" Denies    7. RESPIRATORY STATUS: \"Describe your breathing? \" (e.g., shortness of breath, wheezing, unable to speak)       Sob with coughing spells    8. BETTER-SAME-WORSE: Anne Hong you getting better, staying the same or getting worse compared to yesterday? \"  If getting worse, ask, \"In what way? \"      Cough worse    9. HIGH RISK DISEASE: \"Do you have any chronic medical problems? \" (e.g., asthma, heart or lung disease, weak immune system, obesity, etc.)      None    10. PREGNANCY: \"Is there any chance you are pregnant? \" \"When was your last menstrual period? \"        N/a    11. OTHER SYMPTOMS: \"Do you have any other symptoms? \"  (e.g., chills, fatigue, headache, loss of smell or taste, muscle pain, sore throat; new loss of smell or taste especially support the diagnosis of COVID-19)        Fatigue, body aches, cough,    Protocols used: CORONAVIRUS (COVID-19) [facility]AGNOSED OR SUSPECTED[facility]

## 2021-09-29 ENCOUNTER — TELEPHONE (OUTPATIENT)
Dept: FAMILY MEDICINE CLINIC | Age: 36
End: 2021-09-29

## 2021-09-29 NOTE — TELEPHONE ENCOUNTER
JUST A FYI                                            Pt called about seeing  with COVID treatment plan. Pt called [facility] report that she went to  The urgent care Sunday 9/26/2021& was Dx with Covid pneum. Was put on Antibiotics ,& inhaler,. Pt hung up befor I could ask if she is tacking her o2 levels or asked what she wanted to be seen for a work release or a extention of sick leave. Writer did call the PT back and left a message to call back to the office. For this note.

## 2021-12-13 ENCOUNTER — APPOINTMENT (OUTPATIENT)
Dept: CT IMAGING | Facility: CLINIC | Age: 36
End: 2021-12-13
Payer: COMMERCIAL

## 2021-12-13 ENCOUNTER — HOSPITAL ENCOUNTER (EMERGENCY)
Facility: CLINIC | Age: 36
Discharge: HOME | End: 2021-12-13
Attending: EMERGENCY MEDICINE
Payer: COMMERCIAL

## 2021-12-13 ENCOUNTER — NURSE ONLY (OUTPATIENT)
Dept: PRIMARY CARE CLINIC | Age: 36
End: 2021-12-13
Payer: COMMERCIAL

## 2021-12-13 VITALS
OXYGEN SATURATION: 99 % | HEIGHT: 58 IN | HEART RATE: 91 BPM | TEMPERATURE: 98.5 F | DIASTOLIC BLOOD PRESSURE: 93 MMHG | BODY MASS INDEX: 37.79 KG/M2 | RESPIRATION RATE: 18 BRPM | WEIGHT: 180 LBS | SYSTOLIC BLOOD PRESSURE: 120 MMHG

## 2021-12-13 DIAGNOSIS — N20.0 KIDNEY STONE: ICD-10-CM

## 2021-12-13 DIAGNOSIS — K57.32 DIVERTICULITIS OF COLON: Primary | ICD-10-CM

## 2021-12-13 DIAGNOSIS — R10.31 RIGHT LOWER QUADRANT PAIN: Primary | ICD-10-CM

## 2021-12-13 LAB
ABSOLUTE EOS #: 0.1 K/UL (ref 0–0.4)
ABSOLUTE IMMATURE GRANULOCYTE: ABNORMAL K/UL (ref 0–0.3)
ABSOLUTE LYMPH #: 2 K/UL (ref 1–4.8)
ABSOLUTE MONO #: 0.5 K/UL (ref 0.1–1.2)
ALBUMIN SERPL-MCNC: 4.3 G/DL (ref 3.5–5.2)
ALBUMIN/GLOBULIN RATIO: 2 (ref 1–2.5)
ALP BLD-CCNC: 91 U/L (ref 35–104)
ALT SERPL-CCNC: 5 U/L (ref 5–33)
AMYLASE: 38 U/L (ref 28–100)
ANION GAP SERPL CALCULATED.3IONS-SCNC: 14 MMOL/L (ref 9–17)
AST SERPL-CCNC: 9 U/L
BASOPHILS # BLD: 1 % (ref 0–2)
BASOPHILS ABSOLUTE: 0 K/UL (ref 0–0.2)
BILIRUB SERPL-MCNC: 0.4 MG/DL (ref 0.3–1.2)
BILIRUBIN DIRECT: 0.1 MG/DL
BILIRUBIN, INDIRECT: 0.3 MG/DL (ref 0–1)
BILIRUBIN, POC: NORMAL
BLOOD URINE, POC: NORMAL
BUN BLDV-MCNC: 6 MG/DL (ref 6–20)
BUN/CREAT BLD: ABNORMAL (ref 9–20)
CALCIUM SERPL-MCNC: 9.6 MG/DL (ref 8.6–10.4)
CHLORIDE BLD-SCNC: 108 MMOL/L (ref 98–107)
CLARITY, POC: NORMAL
CO2: 19 MMOL/L (ref 20–31)
COLOR, POC: NORMAL
CONTROL: YES
CREAT SERPL-MCNC: 0.7 MG/DL (ref 0.5–0.9)
DIFFERENTIAL TYPE: ABNORMAL
EKG ATRIAL RATE: 86 BPM
EKG P AXIS: 33 DEGREES
EKG P-R INTERVAL: 156 MS
EKG Q-T INTERVAL: 380 MS
EKG QRS DURATION: 74 MS
EKG QTC CALCULATION (BAZETT): 454 MS
EKG R AXIS: 12 DEGREES
EKG T AXIS: 31 DEGREES
EKG VENTRICULAR RATE: 86 BPM
EOSINOPHILS RELATIVE PERCENT: 1 % (ref 1–4)
GFR AFRICAN AMERICAN: >60 ML/MIN
GFR NON-AFRICAN AMERICAN: >60 ML/MIN
GFR SERPL CREATININE-BSD FRML MDRD: ABNORMAL ML/MIN/{1.73_M2}
GFR SERPL CREATININE-BSD FRML MDRD: ABNORMAL ML/MIN/{1.73_M2}
GLOBULIN: NORMAL G/DL (ref 1.5–3.8)
GLUCOSE BLD-MCNC: 106 MG/DL (ref 70–99)
GLUCOSE URINE, POC: NORMAL
HCG QUANTITATIVE: <1 IU/L
HCT VFR BLD CALC: 37 % (ref 36–46)
HEMOGLOBIN: 12.4 G/DL (ref 12–16)
IMMATURE GRANULOCYTES: ABNORMAL %
KETONES, POC: NORMAL
LEUKOCYTE EST, POC: NORMAL
LIPASE: 17 U/L (ref 13–60)
LYMPHOCYTES # BLD: 21 % (ref 24–44)
MCH RBC QN AUTO: 27.8 PG (ref 26–34)
MCHC RBC AUTO-ENTMCNC: 33.5 G/DL (ref 31–37)
MCV RBC AUTO: 83 FL (ref 80–100)
MONOCYTES # BLD: 6 % (ref 2–11)
NITRITE, POC: NORMAL
NRBC AUTOMATED: ABNORMAL PER 100 WBC
PDW BLD-RTO: 13.2 % (ref 12.5–15.4)
PH, POC: 6
PLATELET # BLD: 283 K/UL (ref 140–450)
PLATELET ESTIMATE: ABNORMAL
PMV BLD AUTO: 7.3 FL (ref 6–12)
POTASSIUM SERPL-SCNC: 3.8 MMOL/L (ref 3.7–5.3)
PREGNANCY TEST URINE, POC: NORMAL
PROTEIN, POC: NORMAL
RBC # BLD: 4.46 M/UL (ref 4–5.2)
RBC # BLD: ABNORMAL 10*6/UL
SEG NEUTROPHILS: 71 % (ref 36–66)
SEGMENTED NEUTROPHILS ABSOLUTE COUNT: 6.9 K/UL (ref 1.8–7.7)
SODIUM BLD-SCNC: 141 MMOL/L (ref 135–144)
SPECIFIC GRAVITY, POC: 1.02
TOTAL PROTEIN: 6.4 G/DL (ref 6.4–8.3)
TROPONIN INTERP: NORMAL
TROPONIN T: NORMAL NG/ML
TROPONIN, HIGH SENSITIVITY: <6 NG/L (ref 0–14)
UROBILINOGEN, POC: 0.2
WBC # BLD: 9.7 K/UL (ref 3.5–11)
WBC # BLD: ABNORMAL 10*3/UL

## 2021-12-13 PROCEDURE — 83690 ASSAY OF LIPASE: CPT

## 2021-12-13 PROCEDURE — 80076 HEPATIC FUNCTION PANEL: CPT

## 2021-12-13 PROCEDURE — 81002 URINALYSIS NONAUTO W/O SCOPE: CPT | Performed by: NURSE PRACTITIONER

## 2021-12-13 PROCEDURE — 82150 ASSAY OF AMYLASE: CPT

## 2021-12-13 PROCEDURE — 74177 CT ABD & PELVIS W/CONTRAST: CPT

## 2021-12-13 PROCEDURE — 93005 ELECTROCARDIOGRAM TRACING: CPT | Performed by: NURSE PRACTITIONER

## 2021-12-13 PROCEDURE — 81025 URINE PREGNANCY TEST: CPT | Performed by: NURSE PRACTITIONER

## 2021-12-13 PROCEDURE — 84702 CHORIONIC GONADOTROPIN TEST: CPT

## 2021-12-13 PROCEDURE — 6360000004 HC RX CONTRAST MEDICATION: Performed by: EMERGENCY MEDICINE

## 2021-12-13 PROCEDURE — 96374 THER/PROPH/DIAG INJ IV PUSH: CPT

## 2021-12-13 PROCEDURE — 2580000003 HC RX 258: Performed by: NURSE PRACTITIONER

## 2021-12-13 PROCEDURE — 6360000002 HC RX W HCPCS: Performed by: NURSE PRACTITIONER

## 2021-12-13 PROCEDURE — 99284 EMERGENCY DEPT VISIT MOD MDM: CPT

## 2021-12-13 PROCEDURE — 80048 BASIC METABOLIC PNL TOTAL CA: CPT

## 2021-12-13 PROCEDURE — 85025 COMPLETE CBC W/AUTO DIFF WBC: CPT

## 2021-12-13 PROCEDURE — 36415 COLL VENOUS BLD VENIPUNCTURE: CPT

## 2021-12-13 PROCEDURE — 2580000003 HC RX 258: Performed by: EMERGENCY MEDICINE

## 2021-12-13 PROCEDURE — 84484 ASSAY OF TROPONIN QUANT: CPT

## 2021-12-13 RX ORDER — SODIUM CHLORIDE 0.9 % (FLUSH) 0.9 %
10 SYRINGE (ML) INJECTION PRN
Status: DISCONTINUED | OUTPATIENT
Start: 2021-12-13 | End: 2021-12-13 | Stop reason: HOSPADM

## 2021-12-13 RX ORDER — 0.9 % SODIUM CHLORIDE 0.9 %
70 INTRAVENOUS SOLUTION INTRAVENOUS ONCE
Status: COMPLETED | OUTPATIENT
Start: 2021-12-13 | End: 2021-12-13

## 2021-12-13 RX ORDER — KETOROLAC TROMETHAMINE 15 MG/ML
15 INJECTION, SOLUTION INTRAMUSCULAR; INTRAVENOUS ONCE
Status: COMPLETED | OUTPATIENT
Start: 2021-12-13 | End: 2021-12-13

## 2021-12-13 RX ORDER — IBUPROFEN 800 MG/1
800 TABLET ORAL 2 TIMES DAILY PRN
Qty: 180 TABLET | Refills: 1 | Status: SHIPPED | OUTPATIENT
Start: 2021-12-13

## 2021-12-13 RX ORDER — 0.9 % SODIUM CHLORIDE 0.9 %
1000 INTRAVENOUS SOLUTION INTRAVENOUS ONCE
Status: COMPLETED | OUTPATIENT
Start: 2021-12-13 | End: 2021-12-13

## 2021-12-13 RX ORDER — TAMSULOSIN HYDROCHLORIDE 0.4 MG/1
0.4 CAPSULE ORAL DAILY
Qty: 5 CAPSULE | Refills: 0 | Status: SHIPPED | OUTPATIENT
Start: 2021-12-13 | End: 2022-01-11

## 2021-12-13 RX ORDER — AMOXICILLIN AND CLAVULANATE POTASSIUM 875; 125 MG/1; MG/1
1 TABLET, FILM COATED ORAL 2 TIMES DAILY
Qty: 20 TABLET | Refills: 0 | Status: SHIPPED | OUTPATIENT
Start: 2021-12-13 | End: 2021-12-23

## 2021-12-13 RX ADMIN — IOPAMIDOL 75 ML: 755 INJECTION, SOLUTION INTRAVENOUS at 13:24

## 2021-12-13 RX ADMIN — SODIUM CHLORIDE 70 ML: 9 INJECTION, SOLUTION INTRAVENOUS at 13:23

## 2021-12-13 RX ADMIN — SODIUM CHLORIDE 1000 ML: 9 INJECTION, SOLUTION INTRAVENOUS at 12:28

## 2021-12-13 RX ADMIN — Medication 10 ML: at 13:22

## 2021-12-13 RX ADMIN — KETOROLAC TROMETHAMINE 15 MG: 15 INJECTION, SOLUTION INTRAMUSCULAR; INTRAVENOUS at 12:29

## 2021-12-13 ASSESSMENT — ENCOUNTER SYMPTOMS
BLOOD IN STOOL: 0
ABDOMINAL DISTENTION: 0
ANAL BLEEDING: 1
RECTAL PAIN: 0
DIARRHEA: 0
RESPIRATORY NEGATIVE: 1
CONSTIPATION: 0
NAUSEA: 1
EYES NEGATIVE: 1
VOMITING: 0
ABDOMINAL PAIN: 1

## 2021-12-13 ASSESSMENT — PAIN SCALES - GENERAL
PAINLEVEL_OUTOF10: 7
PAINLEVEL_OUTOF10: 7

## 2021-12-13 ASSESSMENT — PAIN DESCRIPTION - LOCATION: LOCATION: ABDOMEN

## 2021-12-13 ASSESSMENT — PAIN DESCRIPTION - PAIN TYPE: TYPE: ACUTE PAIN

## 2021-12-13 NOTE — PROGRESS NOTES
Right lower quad pain. Pain is between 6-8. Hx of diverticulitis, but she doesnt think its that. Urine showed some blood in it, pt states that she is not currently on her period. Pain radiates to her back. POCT pregnancy is negative. Per Chalino Scripture she needs to have a scan done at the ER. Patient was told to go to the [facility] ER on [address] Pt verbalized her understanding.

## 2021-12-13 NOTE — ED PROVIDER NOTES
[facility] ED  [address] [facility]  Phone: [phone]      Attending Physician [address]       Chief Complaint   Patient presents with    Abdominal Pain       DIAGNOSTIC RESULTS     LABS:  Labs Reviewed   CBC WITH AUTO DIFFERENTIAL - Abnormal; Notable for the following components:       Result Value    Seg Neutrophils 71 (*)     Lymphocytes 21 (*)     All other components within normal limits   BASIC METABOLIC PANEL W/ REFLEX TO MG FOR LOW K - Abnormal; Notable for the following components:    Glucose 106 (*)     Chloride 108 (*)     CO2 19 (*)     All other components within normal limits   HEPATIC FUNCTION PANEL   LIPASE   AMYLASE   TROPONIN   HCG, QUANTITATIVE, PREGNANCY   URINE RT REFLEX TO CULTURE       All other labs were within normal range or not returned as of this dictation. RADIOLOGY:  CT ABDOMEN PELVIS W IV CONTRAST Additional Contrast? None   Final Result   1. Diverticulitis involving the ascending colon. 2.  Nonobstructing left renal stones      RECOMMENDATIONS:   Unavailable               EMERGENCY DEPARTMENT COURSE:   Vitals:    Vitals:    12/13/21 1159   BP: (!) 120/93   Pulse: 91   Resp: 18   Temp: 98.5 °F (36.9 °C)   TempSrc: Oral   SpO2: 99%   Weight: 81.6 kg (180 lb)   Height: 4' 10\" (1.473 m)     -------------------------  BP: (!) 120/93, Temp: 98.5 °F (36.9 °C), Pulse: 91, Resp: 18             PERTINENT ATTENDING PHYSICIAN COMMENTS:    I performed a history and physical examination of the patient and discussed management with the mid level provider. I reviewed the mid level provider's note and agree with the documented findings and plan of care. Any areas of disagreement are noted on the chart. I was personally present for the key portions of any procedures. I have documented in the chart those procedures where I was not present during the key portions. I have reviewed the emergency nurses triage note.  I agree with the chief complaint, past medical history, past surgical history, allergies, medications, social and family history as documented unless otherwise noted below. Documentation of the HPI, Physical Exam and Medical Decision Making performed by mid level providers is based on my personal performance of the HPI, PE and MDM. For Physician Assistant/ Nurse Practitioner cases/documentation I have personally evaluated this patient and have completed at least one if not all key elements of the E/M (history, physical exam, and MDM). Additional findings are as noted. Patient's laboratory results were grossly unremarkable, CT scan showed evidence of some nonobstructing stones and mild diverticulitis.   Patient being treated with antibiotics, Flomax and anti-inflammatories, talked about PCP and urology follow-up      (Please note that portions of this note were completed with a voice recognition program.  Efforts were made to edit the dictations but occasionally words are mis-transcribed.)    Chasity García DO  Attending Emergency Medicine Physician       Chasity García DO  12/13/21 9438

## 2021-12-13 NOTE — ED PROVIDER NOTES
[facility]  [address] [facility]  Phone: [phone]        Pt Name: Abram Lara  MRN: [id number]  [place] 1985  Date of evaluation: 12/13/21    CHIEFCOMPLAINT       Chief Complaint   Patient presents with    Abdominal Pain       HISTORY OF PRESENT ILLNESS (Location/Symptom, Timing/Onset, Context/Setting, Quality, Duration, Modifying Factors, Severity)      Abram Lara is a 39 y.o. female with no pertinent PMH who presents to the ED via private auto with complaints of right-sided abdominal pain since Friday. Patient states she has had a history of diverticulitis as well as kidney stones. Patient states the pain is constant and dull. Patient states eating and drinking she notices makes pain worse. Patient has been taking Tylenol Extra Strength for pain, without relief. Patient states she has been slightly nauseated, but has not vomited. Patient denies any change in her stool habits, although she felt as if this is her diverticulosis acting up, and did take a stool softener last night and did have a bowel movement this morning. Patient states she always has some blood in her stool, especially with wiping as she does have a history of internal hemorrhoids and has been dealing with this since the age of 23. Patient states her stool was not abnormally dark in color and she did not notice any blood in the actual stool itself. Patient denies any pain with urination or concerns of STDs at this time, although patient states she has noticed some vaginal discharge which is normal for her monthly. Patient states she is on birth control due to very painful and heavy periods, patient states she did have her fallopian tubes removed years ago but will still have vaginal discharge monthly. Patient denies any blood in her urine. Patient states the pain is primarily on the right side and radiates to the back. Patient denies any recent fever, chills, body aches.   Patient denies any trauma or injury to her abdomen. Patient denies any chest pain or shortness of breath. Patient states she did have her gallbladder removed years ago. PAST MEDICAL / SURGICAL / SOCIAL / FAMILY HISTORY     PMH:  has a past medical history of Anxiety, Bicornuate uterus, GERD (gastroesophageal reflux disease), and Miscarriage. Surgical History:  has a past surgical history that includes Tubal ligation (10/2015) and Breast reduction surgery (10/2016). Social History:  reports that she has never smoked. She has never used smokeless tobacco. She reports that she does not drink alcohol and does not use drugs. Family History: She indicated that the status of her mother is unknown. She indicated that the status of her father is unknown. She indicated that the status of her maternal grandmother is unknown.   family history includes Anxiety Disorder in her mother; Depression in her mother; Diabetes in her maternal grandmother; Elevated Lipids in her mother;  Problems in her mother; Kidney Disease in her mother; Other in her father and mother. Psychiatric History: None    Allergies: Patient has no known allergies. Home Medications:   Prior to Admission medications    Medication Sig Start Date End Date Taking?  Authorizing Provider   amoxicillin-clavulanate (AUGMENTIN) 875-125 MG per tablet Take 1 tablet by mouth 2 times daily for 10 days 12/13/21 12/23/21 Yes KAYA Resendiz NP   ibuprofen (ADVIL;MOTRIN) 800 MG tablet Take 1 tablet by mouth 2 times daily as needed for Pain 12/13/21  Yes KAYA Resendiz NP   tamsulosin [facility]) 0.4 MG capsule Take 1 capsule by mouth daily 12/13/21  Yes KAYA Resendiz NP   albuterol sulfate HFA (VENTOLIN HFA) 108 (90 Base) MCG/ACT inhaler Inhale 2 puffs into the lungs 4 times daily as needed for Wheezing 9/23/21   Billie Santillan MD   Spacer/Aero-Holding Antelmo STEVENSON 1 Device by Does not apply route daily as needed (with inhaler) 9/23/21   Billie Santillan MD amLODIPine (NORVASC) 5 MG tablet TAKE ONE TABLET BY MOUTH DAILY 7/6/21   Rod Tyler MD   pantoprazole (PROTONIX) 40 MG tablet TAKE ONE TABLET BY MOUTH DAILY 6/1/21   Rod Tyler MD   fexofenadine-pseudoephedrine (ALLEGRA-D 24HR) 180-240 MG per extended release tablet Take 1 tablet by mouth daily 12/15/20   Kavin Cheung PA-C   Norethin Ace-Eth Estrad-FE 1-20 MG-MCG(24) CAPS Take 1 capsule by mouth daily 10/26/17   KAYA Pearce - CNP   topiramate (TOPAMAX) 25 MG tablet Take 1 tablet by mouth 2 times daily  Patient taking differently: Take 100 mg by mouth 2 times daily  2/28/17 9/23/22  KAYA Walker - CNP       REVIEW OF SYSTEMS  (2-9 systems for level 4, 10 ormore for level 5)      Review of Systems   Constitutional: Negative. HENT: Negative. Eyes: Negative. Respiratory: Negative. Cardiovascular: Negative. Gastrointestinal: Positive for abdominal pain, anal bleeding and nausea. Negative for abdominal distention, blood in stool, constipation, diarrhea, rectal pain and vomiting. Right upper quadrant and right lower quadrant; history of internal and external hemorrhoids   Endocrine: Negative. Genitourinary: Negative. Musculoskeletal: Negative. Skin: Negative. Neurological: Negative. Hematological: Negative. Psychiatric/Behavioral: Negative. All other systems negative except as marked. PHYSICAL EXAM  (up to 7 for level 4, 8 or more for level 5)      INITIAL VITALS:  height is 4' 10\" (1.473 m) and weight is 81.6 kg (180 lb). Her oral temperature is 98.5 °F (36.9 °C). Her blood pressure is 120/93 (abnormal) and her pulse is 91. Her respiration is 18 and oxygen saturation is 99%. Vital signs reviewed. Physical Exam  Constitutional:       Appearance: She is well-developed. HENT:      Head: Normocephalic. Mouth/Throat:      Mouth: Mucous membranes are moist.      Pharynx: Oropharynx is clear.    Eyes: Extraocular Movements: Extraocular movements intact. Cardiovascular:      Rate and Rhythm: Normal rate and regular rhythm. Heart sounds: Normal heart sounds. Pulmonary:      Effort: Pulmonary effort is normal.      Breath sounds: Normal breath sounds. Abdominal:      General: Abdomen is flat. Bowel sounds are normal. There is no distension or abdominal bruit. There are no signs of injury. Palpations: Abdomen is soft. Tenderness: There is abdominal tenderness in the right upper quadrant and right lower quadrant. There is right CVA tenderness. There is no left CVA tenderness, guarding or rebound. Skin:     General: Skin is warm and dry. Capillary Refill: Capillary refill takes less than 2 seconds. Neurological:      Mental Status: She is alert and oriented to person, place, and time. Psychiatric:         Mood and Affect: Mood normal.         Behavior: Behavior normal.           DIFFERENTIAL DIAGNOSIS / MDM     Upon exam, patient resting in her room. She was brought in by her mother today. Patient reports her pain is about a 6 out of 10 currently. Patient took Tylenol Extra Strength this morning but has not taken any NSAIDs. Patient denies any history of kidney disease. Upon auscultation, heart lung sounds within normal limits. Upon auscultation, bowel sounds are present. With palpation, patient reports right upper quadrant as well as right lower quadrant pain. Right-sided CVA tenderness noted. No left-sided CVA tenderness. No abdominal distention noted at this time and the abdomen is soft. Patient denies any numbness or tingling. Patient denies any chest pain or shortness of breath. Patient states she has been eating and drinking, but feels like she has not been eating and drinking as much as she normally does due to feeling uncomfortable since Friday.   Patient was at her doctor's office this morning and they did run a urinalysis on her, patient states she believes they told her they found some blood in her urine as I asked if she was on her period. Patient no longer menstruates due to birth control she is taking, but states she does often experience some vaginal discharge monthly and cramping. Patient denies any vaginal bleeding at this time. Will order CBC, BMP hepatic function panel, lipase, amylase, troponin, urinalysis, serum pregnancy as well as CT of abdomen due to patient's history of diverticulitis. Patient denies any recent fever or chills. Patient denies the need for anything for nausea at this time. We will also give patient bolus. Will order Toradol for pain. Patient agrees with plan of care. Will follow up with testing. CT showing 2 renal stones including one 4 mm and 2 mm non obstructing; also showing diverticulitis of the descending colon. Will put patient on Augmentin for 10 days as well as Flomax. Patient educated to strain her urine as well monitor for stone passage. We will also provide patient with urology consult information to follow-up with outpatient. Patient states that [address] did not help with her pain but does make her constipated, requesting ibuprofen 800 mg instead. Patient educated please follow-up with her primary care within the next day or so. Please return to emergency department or symptoms including fever, chills, body aches. Patient agrees with plan of care. Patient stable for discharge.     PLAN (LABS / IMAGING / EKG):  Orders Placed This Encounter   Procedures    CT ABDOMEN PELVIS W IV CONTRAST Additional Contrast? None    CBC Auto Differential    Basic Metabolic Panel w/ Reflex to MG    Hepatic Function Panel    Lipase    Amylase    Troponin    Urinalysis Reflex to Culture    HCG, Quantitative, Pregnancy    EKG 12 Lead    Place CT Compatible peripheral IV       MEDICATIONS ORDERED:  Orders Placed This Encounter   Medications    0.9 % sodium chloride bolus    ketorolac (TORADOL) injection 15 mg    0.9 % sodium chloride bolus    iopamidol (ISOVUE-370) 76 % injection 75 mL    sodium chloride flush 0.9 % injection 10 mL    amoxicillin-clavulanate (AUGMENTIN) 875-125 MG per tablet     Sig: Take 1 tablet by mouth 2 times daily for 10 days     Dispense:  20 tablet     Refill:  0    ibuprofen (ADVIL;MOTRIN) 800 MG tablet     Sig: Take 1 tablet by mouth 2 times daily as needed for Pain     Dispense:  180 tablet     Refill:  1    tamsulosin (FLOMAX) 0.4 MG capsule     Sig: Take 1 capsule by mouth daily     Dispense:  5 capsule     Refill:  0       Controlled Substances Monitoring:     DIAGNOSTIC RESULTS     EKG: All EKG's are interpreted by the Emergency Department Physician who either signs or Co-signs this chart in the absenceof a cardiologist.      RADIOLOGY: All images are read by the radiologist and their interpretations are reviewed. CT ABDOMEN PELVIS W IV CONTRAST Additional Contrast? None   Final Result   1. Diverticulitis involving the ascending colon. 2.  Nonobstructing left renal stones      RECOMMENDATIONS:   Unavailable             No results found.     LABS:  Results for orders placed or performed during the hospital encounter of 12/13/21   CBC Auto Differential   Result Value Ref Range    WBC 9.7 3.5 - 11.0 k/uL    RBC 4.46 4.0 - 5.2 m/uL    Hemoglobin 12.4 12.0 - 16.0 g/dL    Hematocrit 37.0 36 - 46 %    MCV 83.0 80 - 100 fL    MCH 27.8 26 - 34 pg    MCHC 33.5 31 - 37 g/dL    RDW 13.2 12.5 - 15.4 %    Platelets 154 070 - 609 k/uL    MPV 7.3 6.0 - 12.0 fL    NRBC Automated NOT REPORTED per 100 WBC    Differential Type NOT REPORTED     Seg Neutrophils 71 (H) 36 - 66 %    Lymphocytes 21 (L) 24 - 44 %    Monocytes 6 2 - 11 %    Eosinophils % 1 1 - 4 %    Basophils 1 0 - 2 %    Immature Granulocytes NOT REPORTED 0 %    Segs Absolute 6.90 1.8 - 7.7 k/uL    Absolute Lymph # 2.00 1.0 - 4.8 k/uL    Absolute Mono # 0.50 0.1 - 1.2 k/uL    Absolute Eos # 0.10 0.0 - 0.4 k/uL    Basophils Absolute 0.00 0.0 - 0.2 k/uL    Absolute Immature Granulocyte NOT REPORTED 0.00 - 0.30 k/uL    WBC Morphology NOT REPORTED     RBC Morphology NOT REPORTED     Platelet Estimate NOT REPORTED    Basic Metabolic Panel w/ Reflex to MG   Result Value Ref Range    Glucose 106 (H) 70 - 99 mg/dL    BUN 6 6 - 20 mg/dL    CREATININE 0.70 0.50 - 0.90 mg/dL    Bun/Cre Ratio NOT REPORTED 9 - 20    Calcium 9.6 8.6 - 10.4 mg/dL    Sodium 141 135 - 144 mmol/L    Potassium 3.8 3.7 - 5.3 mmol/L    Chloride 108 (H) 98 - 107 mmol/L    CO2 19 (L) 20 - 31 mmol/L    Anion Gap 14 9 - 17 mmol/L    GFR Non-African American >60 >60 mL/min    GFR African American >60 >60 mL/min    GFR Comment          GFR Staging NOT REPORTED    Hepatic Function Panel   Result Value Ref Range    Albumin 4.3 3.5 - 5.2 g/dL    Alkaline Phosphatase 91 35 - 104 U/L    ALT 5 5 - 33 U/L    AST 9 <32 U/L    Total Bilirubin 0.40 0.3 - 1.2 mg/dL    Bilirubin, Direct 0.10 <0.31 mg/dL    Bilirubin, Indirect 0.30 0.00 - 1.00 mg/dL    Total Protein 6.4 6.4 - 8.3 g/dL    Globulin NOT REPORTED 1.5 - 3.8 g/dL    Albumin/Globulin Ratio 2.0 1.0 - 2.5   Lipase   Result Value Ref Range    Lipase 17 13 - 60 U/L   Amylase   Result Value Ref Range    Amylase 38 28 - 100 U/L   Troponin   Result Value Ref Range    Troponin, High Sensitivity <6 0 - 14 ng/L    Troponin T NOT REPORTED <0.03 ng/mL    Troponin Interp NOT REPORTED    HCG, Quantitative, Pregnancy   Result Value Ref Range    hCG Quant <1 <5 IU/L   EKG 12 Lead   Result Value Ref Range    Ventricular Rate 86 BPM    Atrial Rate 86 BPM    P-R Interval 156 ms    QRS Duration 74 ms    Q-T Interval 380 ms    QTc Calculation (Bazett) 454 ms    P Axis 33 degrees    R Axis 12 degrees    T Axis 31 degrees       EMERGENCY DEPARTMENT COURSE           Vitals:    Vitals:    12/13/21 1159   BP: (!) 120/93   Pulse: 91   Resp: 18   Temp: 98.5 °F (36.9 °C)   TempSrc: Oral   SpO2: 99%   Weight: 81.6 kg (180 lb)   Height: 4' 10\" (1.473 m) -------------------------  BP: (!) 120/93, Temp: 98.5 °F (36.9 °C), Pulse: 91, Resp: 18      RE-EVALUATION:  See ED Course notes above. CONSULTS:  None    PROCEDURES:  None    FINAL IMPRESSION      1. Diverticulitis of colon    2. Kidney stone          DISPOSITION / PLAN     CONDITION ON DISPOSITION:   Good / Stable for discharge. PATIENT REFERRED TO:  Rod Tyler MD  [facility] [phone]    Call in 1 day      [facility]  [address] [facility] [address] [facility]  [phone]  Go to   If symptoms worsen    Monika Patel MD  [phone]  [address] [facility], [address] [facility]    Call today  to schedule an appointment for follow up      DISCHARGE MEDICATIONS:  New Prescriptions    AMOXICILLIN-CLAVULANATE (AUGMENTIN) 875-125 MG PER TABLET    Take 1 tablet by mouth 2 times daily for 10 days    IBUPROFEN (ADVIL;MOTRIN) 800 MG TABLET    Take 1 tablet by mouth 2 times daily as needed for Pain    TAMSULOSIN (FLOMAX) 0.4 MG CAPSULE    Take 1 capsule by mouth daily       KAYA Arreguin NP   Emergency Medicine Nurse Practitioner    (Please note that portions of this note were completed with a voice recognition program.  Efforts were made to edit the dictations but occasionally words aremis-transcribed.)     KAYA Arreguin NP  12/13/21 0637

## 2021-12-22 ENCOUNTER — PATIENT MESSAGE (OUTPATIENT)
Dept: FAMILY MEDICINE CLINIC | Age: 36
End: 2021-12-22

## 2021-12-22 DIAGNOSIS — B37.9 ANTIBIOTIC-INDUCED YEAST INFECTION: Primary | ICD-10-CM

## 2021-12-22 DIAGNOSIS — T36.95XA ANTIBIOTIC-INDUCED YEAST INFECTION: Primary | ICD-10-CM

## 2021-12-22 NOTE — TELEPHONE ENCOUNTER
From: Marek Nguyen  To: Dr. Na Orozco: 12/22/2021 7:04 AM EST  Subject: Prescription Question    I was prescribed antibiotics for my diverticulitis when I had to go to the ER last week and was told to watch for signs of a yeast infection and am not having discharge and itching. The monistat does not usually work for me I have to get the pills from you. I was hoping to get them before the holidays so I do not have to spend another week miserable. Please let me know if there is anything I need to do.     Thank you and have a great holiday,  Marek Nguyen

## 2021-12-24 RX ORDER — FLUCONAZOLE 150 MG/1
150 TABLET ORAL
Qty: 2 TABLET | Refills: 0 | Status: SHIPPED | OUTPATIENT
Start: 2021-12-24 | End: 2021-12-30

## 2021-12-27 NOTE — TELEPHONE ENCOUNTER
Last Visit Date: 12/15/2020   Next Visit Date: 1/11/2022     Pt called stating they tried to get prescription refilled from previous PCP until she can be seen by Jose Boyce to establish. They denied her refill and was told to ask future PCP. Pt is experiencing flare up and was wanting a refill for until her appt with on 1/11/21. Please advise.

## 2021-12-28 RX ORDER — VALACYCLOVIR HYDROCHLORIDE 500 MG/1
500 TABLET, FILM COATED ORAL 2 TIMES DAILY
Qty: 30 TABLET | Refills: 5 | Status: SHIPPED | OUTPATIENT
Start: 2021-12-28 | End: 2022-05-02 | Stop reason: SDUPTHER

## 2022-01-11 ENCOUNTER — OFFICE VISIT (OUTPATIENT)
Dept: PRIMARY CARE CLINIC | Age: 37
End: 2022-01-11
Payer: COMMERCIAL

## 2022-01-11 VITALS
WEIGHT: 180.6 LBS | SYSTOLIC BLOOD PRESSURE: 108 MMHG | BODY MASS INDEX: 37.91 KG/M2 | DIASTOLIC BLOOD PRESSURE: 86 MMHG | HEART RATE: 86 BPM | RESPIRATION RATE: 16 BRPM | OXYGEN SATURATION: 99 % | HEIGHT: 58 IN

## 2022-01-11 DIAGNOSIS — Z13.6 ENCOUNTER FOR LIPID SCREENING FOR CARDIOVASCULAR DISEASE: ICD-10-CM

## 2022-01-11 DIAGNOSIS — Z76.89 ENCOUNTER TO ESTABLISH CARE: Primary | ICD-10-CM

## 2022-01-11 DIAGNOSIS — Z13.220 ENCOUNTER FOR LIPID SCREENING FOR CARDIOVASCULAR DISEASE: ICD-10-CM

## 2022-01-11 DIAGNOSIS — G43.109 MIGRAINE WITH AURA AND WITHOUT STATUS MIGRAINOSUS, NOT INTRACTABLE: ICD-10-CM

## 2022-01-11 DIAGNOSIS — D22.9 ATYPICAL NEVI: ICD-10-CM

## 2022-01-11 DIAGNOSIS — R20.2 NUMBNESS AND TINGLING: ICD-10-CM

## 2022-01-11 DIAGNOSIS — Z13.0 SCREENING FOR DEFICIENCY ANEMIA: ICD-10-CM

## 2022-01-11 DIAGNOSIS — E72.12 MTHFR (METHYLENE THF REDUCTASE) DEFICIENCY AND HOMOCYSTINURIA (HCC): ICD-10-CM

## 2022-01-11 DIAGNOSIS — R20.0 NUMBNESS AND TINGLING: ICD-10-CM

## 2022-01-11 DIAGNOSIS — E55.9 VITAMIN D DEFICIENCY: ICD-10-CM

## 2022-01-11 DIAGNOSIS — E72.11 MTHFR (METHYLENE THF REDUCTASE) DEFICIENCY AND HOMOCYSTINURIA (HCC): ICD-10-CM

## 2022-01-11 DIAGNOSIS — H91.93 BILATERAL HEARING LOSS, UNSPECIFIED HEARING LOSS TYPE: ICD-10-CM

## 2022-01-11 DIAGNOSIS — Z13.1 SCREENING FOR DIABETES MELLITUS: ICD-10-CM

## 2022-01-11 DIAGNOSIS — K21.9 GASTROESOPHAGEAL REFLUX DISEASE WITHOUT ESOPHAGITIS: Chronic | ICD-10-CM

## 2022-01-11 DIAGNOSIS — Z13.29 SCREENING FOR THYROID DISORDER: ICD-10-CM

## 2022-01-11 PROCEDURE — 1036F TOBACCO NON-USER: CPT | Performed by: PHYSICIAN ASSISTANT

## 2022-01-11 PROCEDURE — 99214 OFFICE O/P EST MOD 30 MIN: CPT | Performed by: PHYSICIAN ASSISTANT

## 2022-01-11 PROCEDURE — G8427 DOCREV CUR MEDS BY ELIG CLIN: HCPCS | Performed by: PHYSICIAN ASSISTANT

## 2022-01-11 PROCEDURE — G8417 CALC BMI ABV UP PARAM F/U: HCPCS | Performed by: PHYSICIAN ASSISTANT

## 2022-01-11 PROCEDURE — G8484 FLU IMMUNIZE NO ADMIN: HCPCS | Performed by: PHYSICIAN ASSISTANT

## 2022-01-11 RX ORDER — PANTOPRAZOLE SODIUM 40 MG/1
40 TABLET, DELAYED RELEASE ORAL DAILY
Qty: 90 TABLET | Refills: 0 | Status: SHIPPED | OUTPATIENT
Start: 2022-01-11 | End: 2022-02-16 | Stop reason: SDUPTHER

## 2022-01-11 RX ORDER — TOPIRAMATE 100 MG/1
TABLET, FILM COATED ORAL
COMMUNITY
Start: 2021-11-29 | End: 2022-01-20 | Stop reason: SDUPTHER

## 2022-01-11 ASSESSMENT — ENCOUNTER SYMPTOMS
SINUS PAIN: 0
COUGH: 0
SHORTNESS OF BREATH: 0
VOMITING: 0
ABDOMINAL PAIN: 1
NAUSEA: 0
RHINORRHEA: 0
CONSTIPATION: 0
BACK PAIN: 0
DIARRHEA: 0

## 2022-01-11 NOTE — PROGRESS NOTES
[address] PRIMARY CARE  [address]  [address]  [address] [address]  Dept: [phone]  Dept Fax: [phone]    Heena Francisco is a 39 y.o. female who presents today for her medical conditions/complaints as noted below. Chief Complaint   Patient presents with   [address]     yearly physical, medicaiton refill        HPI:     Patient presents to the office to establish care and for annual physical.  She is switching PCPs due to lack of access. Past medical history including GERD, migraines, MTHFR, HSV. Patient has a significant past medical history. She describes chronic migraines. She has intermittent numbness and tingling of distal extremities. She notes spine tingling and pain as well. She was seeing a neurologist prior who is ordered MRIs. She was going to get a lumbar puncture, but this was halted due to COVID. She would like new referral to neurology. She is currently stable on Topamax for the most part. Patient also has complicated history of diverticulitis. This started when she was a young adult. She has had 2-3 colonoscopies which demonstrate diverticulitis throughout the entire colon. She is being worked up by Dr. Julius Bowman at [address] [place] [address]. The plan will be removal of the colon. She is unsure when this will happen. Patient has chronic GERD. She is using Protonix however this does not provide enough relief and she does take an acids several times per week. Patient describes a poor diet which includes junk food, soda, chocolate. Patient is currently taking birth control for management of gynecological issues. She has discussed in the past a hysterectomy with gynecology, but they told her she is too young. She has no interest in having anymore children. 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 Patient also has concern for gradual hearing loss over the past 1 to 2 years. No direct trauma.   She does have chronic history of ear infections and ear tubes when younger. This is bilateral issue. No prior evaluation. Patient also has concern for an atypical mole that is changing and growing on stomach. She reports that this has darkened and gotten larger over the past year. No other skin concerns. Denies any pain, drainage, itching. Patient is due to update screening labs. BP stable. Weight stable.       Hemoglobin A1C (%)   Date Value   01/03/2020 5.3   10/26/2012 4.6             ( goal A1C is < 7)   No results found for: LABMICR  LDL Cholesterol (mg/dL)   Date Value   03/01/2017 116   07/14/2015 130       (goal LDL is <100)   AST (U/L)   Date Value   12/13/2021 9     ALT (U/L)   Date Value   12/13/2021 5     BUN (mg/dL)   Date Value   12/13/2021 6     BP Readings from Last 3 Encounters:   01/11/22 108/86   12/13/21 (!) 120/93   12/15/20 104/80          (goal 120/80)    Past Medical History:   Diagnosis Date    Anxiety     Bicornuate uterus     GERD (gastroesophageal reflux disease)     Miscarriage     6 total      Past Surgical History:   Procedure Laterality Date    BREAST REDUCTION SURGERY  10/2016    TUBAL LIGATION  10/2015       Family History   Problem Relation Age of Onset    Depression Mother     Anxiety Disorder Mother     Elevated Lipids Mother      Problems Mother     Kidney Disease Mother         CKD    Other Mother         GERD    Other Father         gallstones    Diabetes Maternal Grandmother        Social History     Tobacco Use    Smoking status: Never Smoker    Smokeless tobacco: Never Used   Substance Use Topics    Alcohol use: No     Alcohol/week: 0.0 standard drinks      Current Outpatient Medications   Medication Sig Dispense Refill    topiramate (TOPAMAX) 100 MG tablet       pantoprazole (PROTONIX) 40 MG tablet Take 1 tablet by mouth daily 90 tablet 0    valACYclovir (VALTREX) 500 MG tablet Take 1 tablet by mouth 2 times daily 30 tablet 5    ibuprofen (ADVIL;MOTRIN) 800 MG tablet Take 1 tablet by mouth 2 times daily as needed for Pain 180 tablet 1    albuterol sulfate HFA (VENTOLIN HFA) 108 (90 Base) MCG/ACT inhaler Inhale 2 puffs into the lungs 4 times daily as needed for Wheezing 54 g 1    Spacer/Aero-Holding Chambers GRAHAM 1 Device by Does not apply route daily as needed (with inhaler) 1 each 0    fexofenadine-pseudoephedrine (ALLEGRA-D 24HR) 180-240 MG per extended release tablet Take 1 tablet by mouth daily 7 tablet 0    Norethin Ace-Eth Estrad-FE 1-20 MG-MCG(24) CAPS Take 1 capsule by mouth daily 28 capsule      No current facility-administered medications for this visit. No Known Allergies    Health Maintenance   Topic Date Due    Flu vaccine (1) 09/23/2022 (Originally 9/1/2021)    COVID-19 Vaccine (1) 09/23/2022 (Originally 4/28/1990)    Hepatitis C screen  09/23/2022 (Originally 1985)    Cervical cancer screen  07/22/2022    Depression Screen  09/23/2022    Diabetes screen  01/03/2023    DTaP/Tdap/Td vaccine (2 - Td or Tdap) 07/09/2025    HIV screen  Completed    Hepatitis A vaccine  Aged Out    Hepatitis B vaccine  Aged Out    Hib vaccine  Aged Out    Meningococcal (ACWY) vaccine  Aged Out    Pneumococcal 0-64 years Vaccine  Aged Out    Varicella vaccine  Discontinued       Subjective:      Review of Systems   Constitutional: Negative for chills, fatigue and fever. HENT: Negative for congestion, rhinorrhea and sinus pain. Respiratory: Negative for cough and shortness of breath. Cardiovascular: Negative for chest pain and leg swelling. Gastrointestinal: Positive for abdominal pain (see HPI). Negative for constipation, diarrhea, nausea and vomiting. Endocrine: Positive for cold intolerance. Genitourinary: Negative for difficulty urinating, frequency and urgency. Musculoskeletal: Negative for arthralgias, back pain and myalgias. Neurological: Positive for numbness and headaches. Negative for dizziness. Psychiatric/Behavioral: Positive for dysphoric mood. Negative for confusion and sleep disturbance. The patient is not nervous/anxious. All other systems reviewed and are negative. Objective:     Physical Exam  Vitals and nursing note reviewed. Constitutional:       General: She is not in acute distress. Appearance: Normal appearance. She is obese. HENT:      Head: Normocephalic. Mouth/Throat:      Mouth: Mucous membranes are moist.   Eyes:      Extraocular Movements: Extraocular movements intact. Conjunctiva/sclera: Conjunctivae normal.      Pupils: Pupils are equal, round, and reactive to light. Cardiovascular:      Rate and Rhythm: Normal rate and regular rhythm. Pulses: Normal pulses. Heart sounds: Normal heart sounds. Pulmonary:      Effort: Pulmonary effort is normal.      Breath sounds: Normal breath sounds. Abdominal:      General: Abdomen is flat. Bowel sounds are normal.      Palpations: Abdomen is soft. Tenderness: There is no abdominal tenderness. Musculoskeletal:      Cervical back: Normal range of motion. Right lower leg: No edema. Left lower leg: No edema. Lymphadenopathy:      Cervical: No cervical adenopathy. Skin:     General: Skin is warm. Capillary Refill: Capillary refill takes less than 2 seconds. Comments: On the abdomen, there is an isolated nevi with asymmetric appearance and color changes, lesion is roughly 5 mm. Neurological:      General: No focal deficit present. Mental Status: She is alert and oriented to person, place, and time. Cranial Nerves: No cranial nerve deficit or facial asymmetry. Sensory: Sensation is intact. Motor: Motor function is intact. No weakness. Gait: Gait is intact.    Psychiatric:         Mood and Affect: Mood normal.         Behavior: Behavior normal.       /86 (Site: Left Upper Arm, Position: Sitting, Cuff Size: Medium Adult)   Pulse 86   Resp 16 Ht 4' 10\" (1.473 m)   Wt 180 lb 9.6 oz (81.9 kg)   SpO2 99%   Breastfeeding No   BMI 37.75 kg/m²     Assessment:       ICD-10-CM    1. Encounter to establish care  Z76.89    2. Gastroesophageal reflux disease without esophagitis  K21.9 pantoprazole (PROTONIX) 40 MG tablet   3. Migraine with aura and without status migrainosus, not intractable  G43.109 Pravin Sarmiento MD, Neurology, [place]   4. Numbness and tingling  R20.0 Pravin Sarmiento MD, Neurology, [place]    R20.2    5. MTHFR (methylene THF reductase) deficiency and homocystinuria (HCC)  E72.12     E72.11    6. Bilateral hearing loss, unspecified hearing loss type  H91.93 MAT Elkins MD, Otolaryngology, [place]   7. Atypical nevi  D22.9 Juventino Ren MD, Dermatology, [place]   8. Screening for deficiency anemia  Z13.0 CBC Auto Differential     Vitamin B12 & Folate   9. Screening for thyroid disorder  Z13.29 TSH with Reflex   10. Encounter for lipid screening for cardiovascular disease  Z13.220 Lipid Panel    Z13.6 Comprehensive Metabolic Panel   11. Vitamin D deficiency  E55.9 Vitamin D 25 Hydroxy   12. Screening for diabetes mellitus  Z13.1 Hemoglobin A1C            Plan:       1. Initial visit to establish care. 2.  Patient to continue Protonix in the morning. I had a long discussion about dietary habits on GERD/heartburn. I suggested gluten-free diet for 2 weeks to see if that provides any relief. 3, 4. Patient to continue Topamax as previously prescribed. Patient given referral to neurology for second opinion on headaches and extremity numbness/tingling. 5.  MTHFR stable no changes. 6.  Patient given referral to ENT for further evaluation of bilateral hearing loss. Discussed possible audiogram.  7.  Patient given referral to dermatology for further evaluation of atypical nevi on abdomen. 8-12. Patient agreeable to several screening labs.     Return in about 2 weeks (around 1/25/2022) for medication recheck and med refills. Orders Placed This Encounter   Procedures    CBC Auto Differential     Standing Status:   Future     Standing Expiration Date:   1/11/2023    TSH with Reflex     Standing Status:   Future     Standing Expiration Date:   1/11/2023    Lipid Panel     Standing Status:   Future     Standing Expiration Date:   4/11/2022     Order Specific Question:   Is Patient Fasting?/# of Hours     Answer: Fast 8-10 hours    Comprehensive Metabolic Panel     Standing Status:   Future     Standing Expiration Date:   1/11/2023    Vitamin B12 & Folate     Standing Status:   Future     Standing Expiration Date:   1/11/2023    Vitamin D 25 Hydroxy     Standing Status:   Future     Standing Expiration Date:   1/11/2023    Hemoglobin A1C     Standing Status:   Future     Standing Expiration Date:   1/11/2023   Vishal Christie MD, Neurology, [place]     Referral Priority:   Routine     Referral Type:   Eval and Treat     Referral Reason:   Specialty Services Required     Referred to Provider:   Jose Martin Tavares MD     Requested Specialty:   Neurology     Number of Visits Requested:   Brissa Romo MD, Otolaryngology, [place]     Referral Priority:   Routine     Referral Type:   Eval and Treat     Referral Reason:   Specialty Services Required     Referred to Provider:   Sandi Milton MD     Requested Specialty:   Otolaryngology     Number of Visits Requested:   Seema Eller MD, Dermatology, [place]     Referral Priority:   Routine     Referral Type:   Eval and Treat     Referral Reason:   Specialty Services Required     Referred to Provider:   Mal Goltz, MD     Requested Specialty:   Dermatology     Number of Visits Requested:   1         Patient given educational materials - see patient instructions. Discussed use, benefit, and side effects of prescribed medications. All patient questions answered. Pt voiced understanding. Reviewed health maintenance.   Instructed to continue current medications, diet and exercise. Patient agreed with treatment plan. Follow up as directed.         Electronically signed by Domenick Peabody, PA-C on 1/11/2022 at 12:54 PM

## 2022-01-18 ENCOUNTER — HOSPITAL ENCOUNTER (OUTPATIENT)
Age: 37
Setting detail: SPECIMEN
Discharge: HOME | End: 2022-01-18

## 2022-01-18 DIAGNOSIS — Z13.0 SCREENING FOR DEFICIENCY ANEMIA: ICD-10-CM

## 2022-01-18 DIAGNOSIS — Z13.1 SCREENING FOR DIABETES MELLITUS: ICD-10-CM

## 2022-01-18 DIAGNOSIS — Z13.220 ENCOUNTER FOR LIPID SCREENING FOR CARDIOVASCULAR DISEASE: ICD-10-CM

## 2022-01-18 DIAGNOSIS — Z13.6 ENCOUNTER FOR LIPID SCREENING FOR CARDIOVASCULAR DISEASE: ICD-10-CM

## 2022-01-18 DIAGNOSIS — E55.9 VITAMIN D DEFICIENCY: ICD-10-CM

## 2022-01-18 DIAGNOSIS — Z13.29 SCREENING FOR THYROID DISORDER: ICD-10-CM

## 2022-01-18 LAB
ABSOLUTE EOS #: 0.14 K/UL (ref 0–0.44)
ABSOLUTE IMMATURE GRANULOCYTE: <0.03 K/UL (ref 0–0.3)
ABSOLUTE LYMPH #: 2.16 K/UL (ref 1.1–3.7)
ABSOLUTE MONO #: 0.32 K/UL (ref 0.1–1.2)
ALBUMIN SERPL-MCNC: 4.4 G/DL (ref 3.5–5.2)
ALBUMIN/GLOBULIN RATIO: 1.7 (ref 1–2.5)
ALP BLD-CCNC: 84 U/L (ref 35–104)
ALT SERPL-CCNC: 6 U/L (ref 5–33)
ANION GAP SERPL CALCULATED.3IONS-SCNC: 19 MMOL/L (ref 9–17)
AST SERPL-CCNC: 12 U/L
BASOPHILS # BLD: 1 % (ref 0–2)
BASOPHILS ABSOLUTE: 0.04 K/UL (ref 0–0.2)
BILIRUB SERPL-MCNC: 0.19 MG/DL (ref 0.3–1.2)
BUN BLDV-MCNC: 7 MG/DL (ref 6–20)
BUN/CREAT BLD: ABNORMAL (ref 9–20)
CALCIUM SERPL-MCNC: 9.6 MG/DL (ref 8.6–10.4)
CHLORIDE BLD-SCNC: 113 MMOL/L (ref 98–107)
CHOLESTEROL/HDL RATIO: 7.5
CHOLESTEROL: 180 MG/DL
CO2: 15 MMOL/L (ref 20–31)
CREAT SERPL-MCNC: 0.78 MG/DL (ref 0.5–0.9)
DIFFERENTIAL TYPE: NORMAL
EOSINOPHILS RELATIVE PERCENT: 2 % (ref 1–4)
GFR AFRICAN AMERICAN: >60 ML/MIN
GFR NON-AFRICAN AMERICAN: >60 ML/MIN
GFR SERPL CREATININE-BSD FRML MDRD: ABNORMAL ML/MIN/{1.73_M2}
GFR SERPL CREATININE-BSD FRML MDRD: ABNORMAL ML/MIN/{1.73_M2}
GLUCOSE BLD-MCNC: 109 MG/DL (ref 70–99)
HCT VFR BLD CALC: 37 % (ref 36.3–47.1)
HDLC SERPL-MCNC: 24 MG/DL
HEMOGLOBIN: 11.9 G/DL (ref 11.9–15.1)
IMMATURE GRANULOCYTES: 0 %
LDL CHOLESTEROL: 80 MG/DL (ref 0–130)
LYMPHOCYTES # BLD: 29 % (ref 24–43)
MCH RBC QN AUTO: 27 PG (ref 25.2–33.5)
MCHC RBC AUTO-ENTMCNC: 32.2 G/DL (ref 28.4–34.8)
MCV RBC AUTO: 83.9 FL (ref 82.6–102.9)
MONOCYTES # BLD: 4 % (ref 3–12)
NRBC AUTOMATED: 0 PER 100 WBC
PDW BLD-RTO: 13.7 % (ref 11.8–14.4)
PLATELET # BLD: 311 K/UL (ref 138–453)
PLATELET ESTIMATE: NORMAL
PMV BLD AUTO: 10.2 FL (ref 8.1–13.5)
POTASSIUM SERPL-SCNC: 4.1 MMOL/L (ref 3.7–5.3)
RBC # BLD: 4.41 M/UL (ref 3.95–5.11)
RBC # BLD: NORMAL 10*6/UL
SEG NEUTROPHILS: 64 % (ref 36–65)
SEGMENTED NEUTROPHILS ABSOLUTE COUNT: 4.85 K/UL (ref 1.5–8.1)
SODIUM BLD-SCNC: 147 MMOL/L (ref 135–144)
TOTAL PROTEIN: 7 G/DL (ref 6.4–8.3)
TRIGL SERPL-MCNC: 382 MG/DL
TSH SERPL DL<=0.05 MIU/L-ACNC: 1.99 MIU/L (ref 0.3–5)
VITAMIN D 25-HYDROXY: 22 NG/ML (ref 30–100)
VLDLC SERPL CALC-MCNC: ABNORMAL MG/DL (ref 1–30)
WBC # BLD: 7.5 K/UL (ref 3.5–11.3)
WBC # BLD: NORMAL 10*3/UL

## 2022-01-19 LAB
ESTIMATED AVERAGE GLUCOSE: 117 MG/DL
FOLATE: 6.4 NG/ML
HBA1C MFR BLD: 5.7 % (ref 4–6)
VITAMIN B-12: 206 PG/ML (ref 232–1245)

## 2022-01-20 ENCOUNTER — TELEPHONE (OUTPATIENT)
Dept: PRIMARY CARE CLINIC | Age: 37
End: 2022-01-20

## 2022-01-20 RX ORDER — TOPIRAMATE 100 MG/1
100 TABLET, FILM COATED ORAL DAILY
Qty: 60 TABLET | Refills: 1 | Status: SHIPPED | OUTPATIENT
Start: 2022-01-20 | End: 2022-06-01 | Stop reason: SDUPTHER

## 2022-01-20 NOTE — TELEPHONE ENCOUNTER
Pt called requesting short term RX to get her through until she can get into her new neurologist. Pt. Scheduled for NPT 3/14/22.      Last Visit Date: 1/11/2022   Next Visit Date: 1/25/2022

## 2022-01-20 NOTE — TELEPHONE ENCOUNTER
Pt. Requesting to pass a message along to provider, stating thank you for assisting in med. Refill for short term to bridge the gap until her neurological appt 3/14/22.

## 2022-01-25 ENCOUNTER — OFFICE VISIT (OUTPATIENT)
Dept: PRIMARY CARE CLINIC | Age: 37
End: 2022-01-25
Payer: COMMERCIAL

## 2022-01-25 VITALS
HEIGHT: 58 IN | SYSTOLIC BLOOD PRESSURE: 126 MMHG | DIASTOLIC BLOOD PRESSURE: 84 MMHG | OXYGEN SATURATION: 98 % | BODY MASS INDEX: 38.75 KG/M2 | RESPIRATION RATE: 16 BRPM | WEIGHT: 184.6 LBS | HEART RATE: 81 BPM

## 2022-01-25 DIAGNOSIS — R73.03 PREDIABETES: ICD-10-CM

## 2022-01-25 DIAGNOSIS — E78.5 DYSLIPIDEMIA: ICD-10-CM

## 2022-01-25 DIAGNOSIS — E53.8 B12 DEFICIENCY: Primary | ICD-10-CM

## 2022-01-25 DIAGNOSIS — K21.9 GASTROESOPHAGEAL REFLUX DISEASE WITHOUT ESOPHAGITIS: Chronic | ICD-10-CM

## 2022-01-25 PROCEDURE — 96372 THER/PROPH/DIAG INJ SC/IM: CPT | Performed by: PHYSICIAN ASSISTANT

## 2022-01-25 PROCEDURE — G8427 DOCREV CUR MEDS BY ELIG CLIN: HCPCS | Performed by: PHYSICIAN ASSISTANT

## 2022-01-25 PROCEDURE — 1036F TOBACCO NON-USER: CPT | Performed by: PHYSICIAN ASSISTANT

## 2022-01-25 PROCEDURE — 99214 OFFICE O/P EST MOD 30 MIN: CPT | Performed by: PHYSICIAN ASSISTANT

## 2022-01-25 PROCEDURE — G8484 FLU IMMUNIZE NO ADMIN: HCPCS | Performed by: PHYSICIAN ASSISTANT

## 2022-01-25 PROCEDURE — G8417 CALC BMI ABV UP PARAM F/U: HCPCS | Performed by: PHYSICIAN ASSISTANT

## 2022-01-25 RX ORDER — CYANOCOBALAMIN 1000 UG/ML
1000 INJECTION INTRAMUSCULAR; SUBCUTANEOUS ONCE
Status: COMPLETED | OUTPATIENT
Start: 2022-01-25 | End: 2022-01-25

## 2022-01-25 RX ADMIN — CYANOCOBALAMIN 1000 MCG: 1000 INJECTION INTRAMUSCULAR; SUBCUTANEOUS at 09:10

## 2022-01-25 ASSESSMENT — ENCOUNTER SYMPTOMS
COUGH: 0
SHORTNESS OF BREATH: 0
NAUSEA: 0
SINUS PAIN: 0
CONSTIPATION: 0
RHINORRHEA: 0
VOMITING: 0
ABDOMINAL PAIN: 0
BACK PAIN: 0
DIARRHEA: 0

## 2022-01-25 NOTE — PROGRESS NOTES
Problems Mother     Kidney Disease Mother         CKD    Other Mother         GERD    Other Father         gallstones    Diabetes Maternal Grandmother        Social History     Tobacco Use    Smoking status: Never Smoker    Smokeless tobacco: Never Used   Substance Use Topics    Alcohol use: No     Alcohol/week: 0.0 standard drinks      Current Outpatient Medications   Medication Sig Dispense Refill    topiramate (TOPAMAX) 100 MG tablet Take 1 tablet by mouth daily 60 tablet 1    pantoprazole (PROTONIX) 40 MG tablet Take 1 tablet by mouth daily 90 tablet 0    valACYclovir (VALTREX) 500 MG tablet Take 1 tablet by mouth 2 times daily 30 tablet 5    ibuprofen (ADVIL;MOTRIN) 800 MG tablet Take 1 tablet by mouth 2 times daily as needed for Pain 180 tablet 1    albuterol sulfate HFA (VENTOLIN HFA) 108 (90 Base) MCG/ACT inhaler Inhale 2 puffs into the lungs 4 times daily as needed for Wheezing 54 g 1    Spacer/Aero-Holding Chambers GRAHAM 1 Device by Does not apply route daily as needed (with inhaler) 1 each 0    fexofenadine-pseudoephedrine (ALLEGRA-D 24HR) 180-240 MG per extended release tablet Take 1 tablet by mouth daily 7 tablet 0    Norethin Ace-Eth Estrad-FE 1-20 MG-MCG(24) CAPS Take 1 capsule by mouth daily 28 capsule      No current facility-administered medications for this visit.      No Known Allergies    Health Maintenance   Topic Date Due    Flu vaccine (1) 09/23/2022 (Originally 9/1/2021)    COVID-19 Vaccine (1) 09/23/2022 (Originally 4/28/1990)    Hepatitis C screen  09/23/2022 (Originally 1985)    Cervical cancer screen  07/22/2022    Depression Screen  09/23/2022    A1C test (Diabetic or Prediabetic)  01/18/2023    DTaP/Tdap/Td vaccine (2 - Td or Tdap) 07/09/2025    HIV screen  Completed    Hepatitis A vaccine  Aged Out    Hepatitis B vaccine  Aged Out    Hib vaccine  Aged Out    Meningococcal (ACWY) vaccine  Aged Out    Pneumococcal 0-64 years Vaccine  Aged Out    Varicella vaccine  Discontinued       Subjective:      Review of Systems   Constitutional: Negative for chills, fatigue and fever. HENT: Negative for congestion, rhinorrhea and sinus pain. Respiratory: Negative for cough and shortness of breath. Cardiovascular: Negative for chest pain and leg swelling. Gastrointestinal: Negative for abdominal pain, constipation, diarrhea, nausea and vomiting. Endocrine: Positive for cold intolerance. Genitourinary: Negative for difficulty urinating, frequency and urgency. Musculoskeletal: Negative for arthralgias, back pain and myalgias. Neurological: Positive for numbness and headaches. Negative for dizziness. Psychiatric/Behavioral: Positive for dysphoric mood. Negative for confusion and sleep disturbance. The patient is not nervous/anxious. All other systems reviewed and are negative. Objective:     Physical Exam  Vitals and nursing note reviewed. Constitutional:       General: She is not in acute distress. Appearance: Normal appearance. She is obese. HENT:      Head: Normocephalic. Right Ear: External ear normal.      Left Ear: External ear normal.      Mouth/Throat:      Mouth: Mucous membranes are moist.   Eyes:      Extraocular Movements: Extraocular movements intact. Conjunctiva/sclera: Conjunctivae normal.      Pupils: Pupils are equal, round, and reactive to light. Cardiovascular:      Rate and Rhythm: Normal rate and regular rhythm. Pulses: Normal pulses. Heart sounds: Normal heart sounds. Pulmonary:      Effort: Pulmonary effort is normal.      Breath sounds: Normal breath sounds. Abdominal:      General: Abdomen is flat. Bowel sounds are normal.      Palpations: Abdomen is soft. Tenderness: There is no abdominal tenderness. Musculoskeletal:      Cervical back: Normal range of motion. Right lower leg: No edema. Left lower leg: No edema. Lymphadenopathy:      Cervical: No cervical adenopathy.    Skin: General: Skin is warm. Capillary Refill: Capillary refill takes less than 2 seconds. Neurological:      General: No focal deficit present. Mental Status: She is alert and oriented to person, place, and time. Psychiatric:         Mood and Affect: Mood normal.         Behavior: Behavior normal.       /84 (Site: Left Upper Arm, Position: Sitting, Cuff Size: Medium Adult)   Pulse 81   Resp 16   Ht 4' 10\" (1.473 m)   Wt 184 lb 9.6 oz (83.7 kg)   SpO2 98%   Breastfeeding No   BMI 38.58 kg/m²     Assessment:       ICD-10-CM    1. B12 deficiency  E53.8 Vitamin B12     cyanocobalamin injection 1,000 mcg   2. Prediabetes  R73.03    3. Dyslipidemia  E78.5    4. Gastroesophageal reflux disease without esophagitis  K21.9             Plan:       1. Patient agreeable to 6 weeks series of B12 injections. She will receive her first today. I encourage increasing vitamin B12 rich foods. 2.  We had a long discussion about prediabetes and management of the use of dietary and physical activity changes. 3.  I recommend over-the-counter fish oil daily. I also advised on increasing physical activity. Advised on avoiding fried foods, fatty foods, saturated fats. I recommend healthy fats as such as almonds, although, and avocados. 4.  Continue daily Protonix    Return in about 3 months (around 4/25/2022) for medication recheck. Orders Placed This Encounter   Procedures    Vitamin B12     Standing Status:   Future     Standing Expiration Date:   1/25/2023         Patient given educational materials - see patient instructions. Discussed use, benefit, and side effects of prescribed medications. All patient questions answered. Pt voiced understanding. Reviewed health maintenance. Instructed to continue current medications, diet and exercise. Patient agreed with treatment plan. Follow up as directed.         Electronically signed by Dar Ribera PA-C on 1/25/2022 at 9:15 AM

## 2022-02-01 ENCOUNTER — NURSE ONLY (OUTPATIENT)
Dept: PRIMARY CARE CLINIC | Age: 37
End: 2022-02-01
Payer: COMMERCIAL

## 2022-02-01 DIAGNOSIS — E53.8 B12 DEFICIENCY: Primary | ICD-10-CM

## 2022-02-01 PROCEDURE — 96372 THER/PROPH/DIAG INJ SC/IM: CPT | Performed by: PHYSICIAN ASSISTANT

## 2022-02-01 RX ORDER — CYANOCOBALAMIN 1000 UG/ML
1000 INJECTION INTRAMUSCULAR; SUBCUTANEOUS ONCE
Status: COMPLETED | OUTPATIENT
Start: 2022-02-01 | End: 2022-02-01

## 2022-02-01 RX ADMIN — CYANOCOBALAMIN 1000 MCG: 1000 INJECTION INTRAMUSCULAR; SUBCUTANEOUS at 09:11

## 2022-02-01 NOTE — PROGRESS NOTES
After obtaining consent, and per orders of POP Fleming, injection of Vit B 12 given in Right deltoid by Mahogany Khan. Delos Goldberg, [address]. Patient instructed to remain in clinic for 20 minutes afterwards, and to report any adverse reaction to me immediately.

## 2022-02-08 ENCOUNTER — NURSE ONLY (OUTPATIENT)
Dept: PRIMARY CARE CLINIC | Age: 37
End: 2022-02-08
Payer: COMMERCIAL

## 2022-02-08 DIAGNOSIS — E53.8 B12 DEFICIENCY: Primary | ICD-10-CM

## 2022-02-08 PROCEDURE — 96372 THER/PROPH/DIAG INJ SC/IM: CPT | Performed by: PHYSICIAN ASSISTANT

## 2022-02-08 RX ORDER — CYANOCOBALAMIN 1000 UG/ML
1000 INJECTION INTRAMUSCULAR; SUBCUTANEOUS ONCE
Status: COMPLETED | OUTPATIENT
Start: 2022-02-08 | End: 2022-02-08

## 2022-02-08 RX ADMIN — CYANOCOBALAMIN 1000 MCG: 1000 INJECTION INTRAMUSCULAR; SUBCUTANEOUS at 08:55

## 2022-02-08 NOTE — PROGRESS NOTES
After obtaining consent, and per orders of POP Fleming, injection of Vit B 12 given in Left deltoid by Orlando Valencia. Lg [facility]. Patient instructed to remain in clinic for 20 minutes afterwards, and to report any adverse reaction to me immediately.

## 2022-02-15 ENCOUNTER — NURSE ONLY (OUTPATIENT)
Dept: PRIMARY CARE CLINIC | Age: 37
End: 2022-02-15
Payer: COMMERCIAL

## 2022-02-15 DIAGNOSIS — E53.8 B12 DEFICIENCY: Primary | ICD-10-CM

## 2022-02-15 PROCEDURE — 96372 THER/PROPH/DIAG INJ SC/IM: CPT | Performed by: PHYSICIAN ASSISTANT

## 2022-02-15 RX ORDER — CYANOCOBALAMIN 1000 UG/ML
1000 INJECTION INTRAMUSCULAR; SUBCUTANEOUS ONCE
Status: COMPLETED | OUTPATIENT
Start: 2022-02-15 | End: 2022-02-15

## 2022-02-15 RX ADMIN — CYANOCOBALAMIN 1000 MCG: 1000 INJECTION INTRAMUSCULAR; SUBCUTANEOUS at 09:11

## 2022-02-15 NOTE — PROGRESS NOTES
After obtaining consent, and per orders of POP Fleming, injection of Vit B 12 given in Right deltoid by Mirella Booth [place], [place]. Patient instructed to remain in clinic for 20 minutes afterwards, and to report any adverse reaction to me immediately.

## 2022-02-16 ENCOUNTER — TELEPHONE (OUTPATIENT)
Dept: PRIMARY CARE CLINIC | Age: 37
End: 2022-02-16

## 2022-02-16 DIAGNOSIS — K21.9 GASTROESOPHAGEAL REFLUX DISEASE WITHOUT ESOPHAGITIS: Chronic | ICD-10-CM

## 2022-02-16 DIAGNOSIS — K21.9 GASTROESOPHAGEAL REFLUX DISEASE, UNSPECIFIED WHETHER ESOPHAGITIS PRESENT: Primary | ICD-10-CM

## 2022-02-16 NOTE — TELEPHONE ENCOUNTER
Patient calling in office, states that her heartburn has been extremely bad since Sunday. States that she has been taking her medication and tums with no relief. She also has been drinking no pop, and has been eating nothing but a bland diet since it started but yet its still getting worse. Patient doesn't know what else to do. Please advise.

## 2022-02-16 NOTE — TELEPHONE ENCOUNTER
Please advise patient that temporarily for 5 to 7 days she can increase Protonix to twice daily. I recommend continuing bland diet avoiding spicy foods, pasta, chocolate, pop, alcohol, caffeinated beverages. I have also placed referral to GI for further evaluation. If symptoms are worsening please have her make an appointment.

## 2022-02-17 RX ORDER — PANTOPRAZOLE SODIUM 40 MG/1
40 TABLET, DELAYED RELEASE ORAL DAILY
Qty: 90 TABLET | Refills: 1 | Status: SHIPPED | OUTPATIENT
Start: 2022-02-17 | End: 2023-02-17

## 2022-02-22 ENCOUNTER — NURSE ONLY (OUTPATIENT)
Dept: PRIMARY CARE CLINIC | Age: 37
End: 2022-02-22
Payer: COMMERCIAL

## 2022-02-22 DIAGNOSIS — E53.8 B12 DEFICIENCY: Primary | ICD-10-CM

## 2022-02-22 PROCEDURE — 96372 THER/PROPH/DIAG INJ SC/IM: CPT | Performed by: PHYSICIAN ASSISTANT

## 2022-02-22 RX ORDER — CYANOCOBALAMIN 1000 UG/ML
1000 INJECTION INTRAMUSCULAR; SUBCUTANEOUS ONCE
Status: COMPLETED | OUTPATIENT
Start: 2022-02-22 | End: 2022-02-22

## 2022-02-22 RX ADMIN — CYANOCOBALAMIN 1000 MCG: 1000 INJECTION INTRAMUSCULAR; SUBCUTANEOUS at 09:22

## 2022-02-22 NOTE — PROGRESS NOTES
After obtaining consent, and per orders of [facility], MAGO, injection of Vitamin B-12 given in Left deltoid by Gregorio Barrett MA. Patient instructed to remain in clinic for 20 minutes afterwards, and to report any adverse reaction to me immediately.

## 2022-02-25 ENCOUNTER — HOSPITAL ENCOUNTER (OUTPATIENT)
Age: 37
Setting detail: SPECIMEN
Discharge: HOME | End: 2022-02-25

## 2022-02-25 DIAGNOSIS — E53.8 B12 DEFICIENCY: ICD-10-CM

## 2022-02-25 LAB — VITAMIN B-12: 602 PG/ML (ref 232–1245)

## 2022-03-03 ENCOUNTER — TELEPHONE (OUTPATIENT)
Dept: GASTROENTEROLOGY | Age: 37
End: 2022-03-03

## 2022-03-03 NOTE — TELEPHONE ENCOUNTER
CALLED PATIENT TO SCHEDULE AND SHE ALREADY HAS A GI WITH THE [facility]. SEEING DR ANDREW. FIRST ATTEMPT AND SENDING BACK TO THE REFERRING PROVIDER.

## 2022-04-04 ENCOUNTER — OFFICE VISIT (OUTPATIENT)
Dept: DERMATOLOGY | Age: 37
End: 2022-04-04
Payer: COMMERCIAL

## 2022-04-04 ENCOUNTER — HOSPITAL ENCOUNTER (OUTPATIENT)
Age: 37
Setting detail: SPECIMEN
Discharge: HOME | End: 2022-04-04

## 2022-04-04 VITALS
SYSTOLIC BLOOD PRESSURE: 123 MMHG | DIASTOLIC BLOOD PRESSURE: 83 MMHG | TEMPERATURE: 97.3 F | OXYGEN SATURATION: 98 % | HEIGHT: 59 IN | HEART RATE: 87 BPM | WEIGHT: 180.8 LBS | BODY MASS INDEX: 36.45 KG/M2

## 2022-04-04 DIAGNOSIS — D48.9 NEOPLASM OF UNCERTAIN BEHAVIOR: Primary | ICD-10-CM

## 2022-04-04 PROCEDURE — 11102 TANGNTL BX SKIN SINGLE LES: CPT | Performed by: PHYSICIAN ASSISTANT

## 2022-04-04 PROCEDURE — 1036F TOBACCO NON-USER: CPT | Performed by: PHYSICIAN ASSISTANT

## 2022-04-04 PROCEDURE — 99203 OFFICE O/P NEW LOW 30 MIN: CPT | Performed by: PHYSICIAN ASSISTANT

## 2022-04-04 PROCEDURE — G8417 CALC BMI ABV UP PARAM F/U: HCPCS | Performed by: PHYSICIAN ASSISTANT

## 2022-04-04 PROCEDURE — G8427 DOCREV CUR MEDS BY ELIG CLIN: HCPCS | Performed by: PHYSICIAN ASSISTANT

## 2022-04-04 RX ORDER — LIDOCAINE HYDROCHLORIDE AND EPINEPHRINE 10; 10 MG/ML; UG/ML
0.5 INJECTION, SOLUTION INFILTRATION; PERINEURAL ONCE
Status: SHIPPED | OUTPATIENT
Start: 2022-04-04

## 2022-04-04 NOTE — PROGRESS NOTES
Dermatology Patient Note  [facility]  [address]  [address]  Dept: [phone]  Dept Fax: [phone]      VISITDATE: 4/4/2022   REFERRING PROVIDER: Freya Rocha PA-C      Angela Anderson is a 39 y.o. female  who presents today in the office for:    New Patient (Mole on abdomen that got darker and sticks out more. She states that its itchy. No self/family hx of skin cancer. )      HISTORY OF PRESENT ILLNESS:  As above.     MEDICAL PROBLEMS:  Patient Active Problem List    Diagnosis Date Noted    Herpes simplex 10/15/2015    Migraine with aura and without status migrainosus, not intractable 08/25/2015    MTHFR (methylene THF reductase) deficiency and [facility] 12/31/2012    GERD (gastroesophageal reflux disease) 02/08/2012       CURRENT MEDICATIONS:   Current Outpatient Medications   Medication Sig Dispense Refill    pantoprazole (PROTONIX) 40 MG tablet Take 1 tablet by mouth daily 90 tablet 1    topiramate (TOPAMAX) 100 MG tablet Take 1 tablet by mouth daily 60 tablet 1    valACYclovir (VALTREX) 500 MG tablet Take 1 tablet by mouth 2 times daily 30 tablet 5    ibuprofen (ADVIL;MOTRIN) 800 MG tablet Take 1 tablet by mouth 2 times daily as needed for Pain 180 tablet 1    albuterol sulfate HFA (VENTOLIN HFA) 108 (90 Base) MCG/ACT inhaler Inhale 2 puffs into the lungs 4 times daily as needed for Wheezing 54 g 1    Spacer/Aero-Holding Chambers GRAHAM 1 Device by Does not apply route daily as needed (with inhaler) 1 each 0    fexofenadine-pseudoephedrine (ALLEGRA-D 24HR) 180-240 MG per extended release tablet Take 1 tablet by mouth daily 7 tablet 0    Norethin Ace-Eth Estrad-FE 1-20 MG-MCG(24) CAPS Take 1 capsule by mouth daily 28 capsule      Current Facility-Administered Medications   Medication Dose Route Frequency Provider Last Rate Last Admin    lidocaine-EPINEPHrine 1 %-1:355499 injection 0.5 mL 0.5 mL IntraDERmal Once Daya Frankel PA-C           ALLERGIES:   No Known Allergies    SOCIAL HISTORY:  Social History     Tobacco Use    Smoking status: Never Smoker    Smokeless tobacco: Never Used   Substance Use Topics    Alcohol use: No     Alcohol/week: 0.0 standard drinks       Pertinent ROS:  Review of Systems  Skin: Denies any new changing, growing or bleeding lesions or rashes except as described in the HPI   Constitutional: Denies fevers, chills, and malaise. PHYSICAL EXAM:   /83 (Site: Left Upper Arm, Position: Sitting, Cuff Size: Large Adult)   Pulse 87   Temp 97.3 °F (36.3 °C) (Temporal)   Ht 4' 11\" (1.499 m)   Wt 180 lb 12.8 oz (82 kg)   SpO2 98%   BMI 36.52 kg/m²     The patient is generally well appearing, well nourished, alert and conversational. Affect is normal.    Cutaneous Exam:  Physical Exam  Face, neck, and abdomen were examined. Facial covering was not removed during examination. Diagnoses/exam findings/medical history pertinent to this visit are listed below:    Assessment:   Diagnosis Orders   1. Neoplasm of uncertain behavior  NM TANGENTIAL BIOPSY SKIN SINGLE LESION    lidocaine-EPINEPHrine 1 %-1:604406 injection 0.5 mL    Surgical Pathology        Plan:  1. Neoplasm of uncertain behavior  Shave Biopsy (right ABD r/o Irritated nevus): The procedure and its risks were explained including but not limited to pain, bleeding, infection, permanent scar, permanent pigment alteration and need for an additional procedure. Consent to proceed with the procedure was obtained from the patient or the parent. After cleaning with alcohol the lesion was anesthetized with 1% lidocaine with epinephrine and was removed with a dermablade. Hemostasis was achieved with aluminum chloride and Vaseline and a bandage were applied.  - NM TANGENTIAL BIOPSY SKIN SINGLE LESION  - lidocaine-EPINEPHrine 1 %-1:104860 injection 0.5 mL  - Surgical Pathology;  Future      RTC per Bx    Future Appointments   Date Time Provider Matt Christie   4/26/2022  9:00 AM Leola Plascencia [place] [facility] AND [facility] [address] [facility]   6/1/2022 11:40 AM Patsy Marquez MD Neuro Spec TOP         There are no Patient Instructions on file for this visit.       Electronically signed by Abdon Johnson PA-C on 4/4/22 at 9:45 AM EDT

## 2022-04-07 LAB — DERMATOLOGY PATHOLOGY REPORT: NORMAL

## 2022-04-07 NOTE — RESULT ENCOUNTER NOTE
We have received and reviewed your biopsy results, which demonstrated a benign skin lesion called a  papillomatous nevus. No further Tx is required for this lesion.

## 2022-04-27 ENCOUNTER — OFFICE VISIT (OUTPATIENT)
Dept: PRIMARY CARE CLINIC | Age: 37
End: 2022-04-27
Payer: COMMERCIAL

## 2022-04-27 VITALS
WEIGHT: 181.8 LBS | RESPIRATION RATE: 16 BRPM | OXYGEN SATURATION: 97 % | DIASTOLIC BLOOD PRESSURE: 86 MMHG | HEIGHT: 59 IN | HEART RATE: 94 BPM | BODY MASS INDEX: 36.65 KG/M2 | SYSTOLIC BLOOD PRESSURE: 124 MMHG

## 2022-04-27 DIAGNOSIS — E66.9 OBESITY (BMI 35.0-39.9 WITHOUT COMORBIDITY): ICD-10-CM

## 2022-04-27 DIAGNOSIS — F33.1 MODERATE EPISODE OF RECURRENT MAJOR DEPRESSIVE DISORDER (HCC): Primary | ICD-10-CM

## 2022-04-27 DIAGNOSIS — K21.9 GASTROESOPHAGEAL REFLUX DISEASE WITHOUT ESOPHAGITIS: Chronic | ICD-10-CM

## 2022-04-27 DIAGNOSIS — E53.8 B12 DEFICIENCY: ICD-10-CM

## 2022-04-27 PROCEDURE — 99214 OFFICE O/P EST MOD 30 MIN: CPT | Performed by: PHYSICIAN ASSISTANT

## 2022-04-27 PROCEDURE — G8427 DOCREV CUR MEDS BY ELIG CLIN: HCPCS | Performed by: PHYSICIAN ASSISTANT

## 2022-04-27 PROCEDURE — G8417 CALC BMI ABV UP PARAM F/U: HCPCS | Performed by: PHYSICIAN ASSISTANT

## 2022-04-27 PROCEDURE — 1036F TOBACCO NON-USER: CPT | Performed by: PHYSICIAN ASSISTANT

## 2022-04-27 RX ORDER — AMOXICILLIN 500 MG/1
CAPSULE ORAL
COMMUNITY
Start: 2022-01-26 | End: 2022-04-27

## 2022-04-27 RX ORDER — BUPROPION HYDROCHLORIDE 150 MG/1
150 TABLET ORAL EVERY MORNING
Qty: 30 TABLET | Refills: 0 | Status: SHIPPED | OUTPATIENT
Start: 2022-04-27 | End: 2022-05-28 | Stop reason: SDUPTHER

## 2022-04-27 RX ORDER — LORAZEPAM 1 MG/1
TABLET ORAL
COMMUNITY
Start: 2022-04-20 | End: 2022-06-27 | Stop reason: ALTCHOICE

## 2022-04-27 ASSESSMENT — ENCOUNTER SYMPTOMS
CONSTIPATION: 0
RHINORRHEA: 0
ABDOMINAL PAIN: 0
SHORTNESS OF BREATH: 0
VOMITING: 0
BACK PAIN: 0
SINUS PAIN: 0
DIARRHEA: 0
NAUSEA: 0
COUGH: 0

## 2022-04-27 NOTE — PROGRESS NOTES
005 \[facility]\"" PRIMARY CARE  [address] [facility]  [address]  [address] [address]  Dept: [phone]  Dept Fax: [phone]    Raheem Antonio is a 39 y.o. female who presents today for her medical conditions/complaints as noted below. Chief Complaint   Patient presents with    Gastroesophageal Reflux     medication recheck     Other     discuss smell of urine, odor started after taking OTC Vitmain B12 and Vitmain D       HPI:     Patient presents to the office for medication recheck. Patient has known history of GERD, migraines, vitamin B12 deficiency, MTHFR deficiency. Today, primary concern is exacerbation of depression. Patient reports she has had mild depression for the majority of her life. However, currently she is experiencing exacerbation due to multiple factors such as marriage, taking care of child. She she states her mood is down several days of the week. This is affecting her quality of life and her sleeping habits. She is also experiencing decreased libido due to depression and poor body image. No active thoughts of harming herself or others. She has thought that others would be happier with how her in their life. She has noticed change in urine concentration/odor since starting over-the-counter B12 and vitamin D supplement. No dysuria, hematuria, frequency changes. Patient reports that all her chronic diseases are stable. GERD is stable on PPI. She states her energy has improved since vitamin B12 injections. BP stable. Weight stable.       Hemoglobin A1C (%)   Date Value   01/18/2022 5.7   01/03/2020 5.3   10/26/2012 4.6             ( goal A1C is < 7)   No results found for: LABMICR  LDL Cholesterol (mg/dL)   Date Value   01/18/2022 80   03/01/2017 116   07/14/2015 130       (goal LDL is <100)   AST (U/L)   Date Value   01/18/2022 12     ALT (U/L)   Date Value   01/18/2022 6     BUN (mg/dL)   Date Value   01/18/2022 7     BP Readings from Last 3 Encounters:   04/27/22 124/86   04/04/22 123/83   01/25/22 126/84          (goal 120/80)    Past Medical History:   Diagnosis Date    Anxiety     Bicornuate uterus     GERD (gastroesophageal reflux disease)     Miscarriage     6 total      Past Surgical History:   Procedure Laterality Date    BREAST REDUCTION SURGERY  10/2016    TUBAL LIGATION  10/2015       Family History   Problem Relation Age of Onset    Depression Mother     Anxiety Disorder Mother     Elevated Lipids Mother      Problems Mother     Kidney Disease Mother         CKD    Other Mother         GERD    Other Father         gallstones    Diabetes Maternal Grandmother        Social History     Tobacco Use    Smoking status: Never Smoker    Smokeless tobacco: Never Used   Substance Use Topics    Alcohol use: No     Alcohol/week: 0.0 standard drinks      Current Outpatient Medications   Medication Sig Dispense Refill    LORazepam (ATIVAN) 1 MG tablet       buPROPion (WELLBUTRIN XL) 150 MG extended release tablet Take 1 tablet by mouth every morning 30 tablet 0    pantoprazole (PROTONIX) 40 MG tablet Take 1 tablet by mouth daily 90 tablet 1    topiramate (TOPAMAX) 100 MG tablet Take 1 tablet by mouth daily 60 tablet 1    valACYclovir (VALTREX) 500 MG tablet Take 1 tablet by mouth 2 times daily 30 tablet 5    ibuprofen (ADVIL;MOTRIN) 800 MG tablet Take 1 tablet by mouth 2 times daily as needed for Pain 180 tablet 1    albuterol sulfate HFA (VENTOLIN HFA) 108 (90 Base) MCG/ACT inhaler Inhale 2 puffs into the lungs 4 times daily as needed for Wheezing 54 g 1    Spacer/Aero-Holding Chambers GRAHAM 1 Device by Does not apply route daily as needed (with inhaler) 1 each 0    Norethin Ace-Eth Estrad-FE 1-20 MG-MCG(24) CAPS Take 1 capsule by mouth daily 28 capsule      Current Facility-Administered Medications   Medication Dose Route Frequency Provider Last Rate Last Admin    lidocaine-EPINEPHrine 1 %-1:471737 injection 0.5 mL  0.5 mL IntraDERmal Once Alton Troy PA-C         No Known Allergies    Health Maintenance   Topic Date Due    Flu vaccine (Season Ended) 09/23/2022 (Originally 9/1/2022)    COVID-19 Vaccine (1) 09/23/2022 (Originally 4[birth year]28/1990)    Hepatitis C screen  09/23/2022 (Originally 4/28/2003)    Cervical cancer screen  07/22/2022    Depression Monitoring  09/23/2022    A1C test (Diabetic or Prediabetic)  01/18/2023    DTaP/Tdap/Td vaccine (2 - Td or Tdap) 07/09/2025    HIV screen  Completed    Hepatitis A vaccine  Aged Out    Hepatitis B vaccine  Aged Out    Hib vaccine  Aged Out    Meningococcal (ACWY) vaccine  Aged Out    Pneumococcal 0-64 years Vaccine  Aged Out    Varicella vaccine  Discontinued       Subjective:      Review of Systems   Constitutional: Negative for chills, fatigue and fever. HENT: Negative for congestion, rhinorrhea and sinus pain. Respiratory: Negative for cough and shortness of breath. Cardiovascular: Negative for chest pain and leg swelling. Gastrointestinal: Negative for abdominal pain, constipation, diarrhea, nausea and vomiting. Genitourinary: Negative for difficulty urinating, frequency and urgency. Musculoskeletal: Negative for arthralgias, back pain and myalgias. Neurological: Negative for dizziness and headaches. Psychiatric/Behavioral: Positive for dysphoric mood and sleep disturbance. Negative for confusion. The patient is nervous/anxious. All other systems reviewed and are negative. Objective:     Physical Exam  Vitals and nursing note reviewed. Constitutional:       General: She is not in acute distress. Appearance: Normal appearance. She is obese. HENT:      Head: Normocephalic. Mouth/Throat:      Mouth: Mucous membranes are moist.   Eyes:      Extraocular Movements: Extraocular movements intact. Conjunctiva/sclera: Conjunctivae normal.      Pupils: Pupils are equal, round, and reactive to light.    Cardiovascular:      Rate and Rhythm: Normal rate and regular rhythm. Pulses: Normal pulses. Heart sounds: Normal heart sounds. Pulmonary:      Effort: Pulmonary effort is normal.      Breath sounds: Normal breath sounds. Abdominal:      General: Abdomen is flat. Bowel sounds are normal.      Palpations: Abdomen is soft. Tenderness: There is no abdominal tenderness. Musculoskeletal:      Cervical back: Normal range of motion. Right lower leg: No edema. Left lower leg: No edema. Lymphadenopathy:      Cervical: No cervical adenopathy. Skin:     General: Skin is warm. Capillary Refill: Capillary refill takes less than 2 seconds. Neurological:      General: No focal deficit present. Mental Status: She is alert and oriented to person, place, and time. Psychiatric:         Mood and Affect: Mood is depressed. Affect is tearful. Speech: Speech normal.         Behavior: Behavior normal.       /86 (Site: Left Upper Arm, Position: Sitting, Cuff Size: Medium Adult)   Pulse 94   Resp 16   Ht 4' 11\" (1.499 m)   Wt 181 lb 12.8 oz (82.5 kg)   SpO2 97%   Breastfeeding No   BMI 36.72 kg/m²     Assessment:       ICD-10-CM    1. Moderate episode of recurrent major depressive disorder (HCC)  F33.1 [facility] ([facility])     buPROPion (WELLBUTRIN XL) 150 MG extended release tablet   2. Obesity (BMI 35.0-39.9 without comorbidity)  E66.9    3. B12 deficiency  E53.8             Plan:       1. Patient with significant concern for depression. Plan to start Wellbutrin 150 mg extended release once daily. Discussed instructions and side effects. In addition, she is given referral to counseling. I discussed the importance of regular activity and hobbies. Discussed crisis helpline for acute concerns. 2.  Discussed importance of weight management. However, with her current depression causing stress eating we will need to address mood depression before obesity.   3.  Discussed daily multivitamin that she seems to have intolerance to current vitamin supplementation she is taking. 4.  Continue Protonix 40 mg once daily in the morning. GERD stable. Return in about 4 weeks (around 5/25/2022) for medication recheck. Orders Placed This Encounter   Procedures    [facility])     Referral Priority:   Routine     Referral Type:   Behavioral Health     Referral Reason:   Specialty Services Required     Referred to Provider:   ZACK Al     Requested Specialty:   Behavioral Health     Number of Visits Requested:   1         Patient given educational materials - see patient instructions. Discussed use, benefit, and side effects of prescribed medications. All patient questions answered. Pt voiced understanding. Reviewed health maintenance. Instructed to continue current medications, diet and exercise. Patient agreed with treatment plan. Follow up as directed.         Electronically signed by Nadine Nieves PA-C on 4/27/2022 at 2:05 PM

## 2022-05-02 RX ORDER — VALACYCLOVIR HYDROCHLORIDE 500 MG/1
500 TABLET, FILM COATED ORAL 2 TIMES DAILY
Qty: 30 TABLET | Refills: 5 | Status: SHIPPED | OUTPATIENT
Start: 2022-05-02 | End: 2022-06-03 | Stop reason: ALTCHOICE

## 2022-05-27 DIAGNOSIS — F33.1 MODERATE EPISODE OF RECURRENT MAJOR DEPRESSIVE DISORDER (HCC): ICD-10-CM

## 2022-05-27 NOTE — TELEPHONE ENCOUNTER
Last Visit Date: 4/27/2022   Next Visit Date: 6/3/2022     Pt was to have f/u appt with PCP 5/26/22 to review & refill medication. PCP is out of office sick. Pt does have f/u scheduled with PCP 6/3/22 but will run out of the medication this weekend. Pt states she would like a refill, feels like the medication is working & does not want to risk being out of it for a few days.

## 2022-05-28 RX ORDER — BUPROPION HYDROCHLORIDE 150 MG/1
150 TABLET ORAL EVERY MORNING
Qty: 30 TABLET | Refills: 0 | Status: SHIPPED | OUTPATIENT
Start: 2022-05-28 | End: 2022-06-03 | Stop reason: SDUPTHER

## 2022-06-01 ENCOUNTER — OFFICE VISIT (OUTPATIENT)
Dept: NEUROLOGY | Age: 37
End: 2022-06-01
Payer: COMMERCIAL

## 2022-06-01 VITALS
DIASTOLIC BLOOD PRESSURE: 73 MMHG | HEART RATE: 90 BPM | SYSTOLIC BLOOD PRESSURE: 111 MMHG | BODY MASS INDEX: 37.57 KG/M2 | WEIGHT: 179 LBS | HEIGHT: 58 IN

## 2022-06-01 DIAGNOSIS — G37.9 DEMYELINATING CHANGES IN BRAIN (HCC): Primary | ICD-10-CM

## 2022-06-01 DIAGNOSIS — Z01.89 NEED FOR ASSESSMENT FOR SLEEP APNEA: ICD-10-CM

## 2022-06-01 DIAGNOSIS — E53.8 B12 DEFICIENCY: ICD-10-CM

## 2022-06-01 DIAGNOSIS — E55.9 VITAMIN D DEFICIENCY: ICD-10-CM

## 2022-06-01 DIAGNOSIS — R06.83 SNORING: ICD-10-CM

## 2022-06-01 PROCEDURE — G8427 DOCREV CUR MEDS BY ELIG CLIN: HCPCS | Performed by: STUDENT IN AN ORGANIZED HEALTH CARE EDUCATION/TRAINING PROGRAM

## 2022-06-01 PROCEDURE — 99204 OFFICE O/P NEW MOD 45 MIN: CPT | Performed by: STUDENT IN AN ORGANIZED HEALTH CARE EDUCATION/TRAINING PROGRAM

## 2022-06-01 PROCEDURE — G8417 CALC BMI ABV UP PARAM F/U: HCPCS | Performed by: STUDENT IN AN ORGANIZED HEALTH CARE EDUCATION/TRAINING PROGRAM

## 2022-06-01 PROCEDURE — 1036F TOBACCO NON-USER: CPT | Performed by: STUDENT IN AN ORGANIZED HEALTH CARE EDUCATION/TRAINING PROGRAM

## 2022-06-01 RX ORDER — AMITRIPTYLINE HYDROCHLORIDE 25 MG/1
25 TABLET, FILM COATED ORAL NIGHTLY
Qty: 90 TABLET | Refills: 1 | Status: SHIPPED | OUTPATIENT
Start: 2022-06-01

## 2022-06-01 RX ORDER — TOPIRAMATE 50 MG/1
75 TABLET, FILM COATED ORAL DAILY
Qty: 45 TABLET | Refills: 11 | Status: SHIPPED | OUTPATIENT
Start: 2022-06-01 | End: 2022-10-04

## 2022-06-01 RX ORDER — RIZATRIPTAN BENZOATE 10 MG/1
10 TABLET ORAL
Qty: 10 TABLET | Refills: 3 | Status: SHIPPED | OUTPATIENT
Start: 2022-06-01 | End: 2022-08-31
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
## 2022-06-01 NOTE — PATIENT INSTRUCTIONS
Decrease topamax to 75 mg, after four weeks decrease to 50 mg daily  Start amitriptyline 25 mg daily  Start rizatriptan 10 mg as need

## 2022-06-03 ENCOUNTER — OFFICE VISIT (OUTPATIENT)
Dept: BEHAVIORAL/MENTAL HEALTH CLINIC | Age: 37
End: 2022-06-03
Payer: COMMERCIAL

## 2022-06-03 ENCOUNTER — OFFICE VISIT (OUTPATIENT)
Dept: PRIMARY CARE CLINIC | Age: 37
End: 2022-06-03
Payer: COMMERCIAL

## 2022-06-03 VITALS
BODY MASS INDEX: 37.87 KG/M2 | HEIGHT: 58 IN | WEIGHT: 180.4 LBS | RESPIRATION RATE: 16 BRPM | OXYGEN SATURATION: 99 % | DIASTOLIC BLOOD PRESSURE: 78 MMHG | HEART RATE: 92 BPM | SYSTOLIC BLOOD PRESSURE: 122 MMHG

## 2022-06-03 DIAGNOSIS — E53.8 B12 DEFICIENCY: ICD-10-CM

## 2022-06-03 DIAGNOSIS — F33.1 MODERATE EPISODE OF RECURRENT MAJOR DEPRESSIVE DISORDER (HCC): Primary | ICD-10-CM

## 2022-06-03 DIAGNOSIS — G43.109 MIGRAINE WITH AURA AND WITHOUT STATUS MIGRAINOSUS, NOT INTRACTABLE: ICD-10-CM

## 2022-06-03 PROBLEM — G56.21 ENTRAPMENT OF RIGHT ULNAR NERVE: Status: ACTIVE | Noted: 2022-06-03

## 2022-06-03 PROBLEM — K57.92 DIVERTICULITIS: Status: ACTIVE | Noted: 2022-06-03

## 2022-06-03 PROBLEM — G56.01 CARPAL TUNNEL SYNDROME OF RIGHT WRIST: Status: ACTIVE | Noted: 2022-06-03

## 2022-06-03 PROBLEM — E66.9 OBESITY: Status: ACTIVE | Noted: 2022-06-03

## 2022-06-03 PROCEDURE — 1036F TOBACCO NON-USER: CPT | Performed by: PHYSICIAN ASSISTANT

## 2022-06-03 PROCEDURE — G8417 CALC BMI ABV UP PARAM F/U: HCPCS | Performed by: PHYSICIAN ASSISTANT

## 2022-06-03 PROCEDURE — G8427 DOCREV CUR MEDS BY ELIG CLIN: HCPCS | Performed by: PHYSICIAN ASSISTANT

## 2022-06-03 PROCEDURE — 99213 OFFICE O/P EST LOW 20 MIN: CPT | Performed by: PHYSICIAN ASSISTANT

## 2022-06-03 PROCEDURE — 90791 PSYCH DIAGNOSTIC EVALUATION: CPT | Performed by: SOCIAL WORKER

## 2022-06-03 RX ORDER — BUPROPION HYDROCHLORIDE 150 MG/1
150 TABLET ORAL EVERY MORNING
Qty: 90 TABLET | Refills: 0 | Status: SHIPPED | OUTPATIENT
Start: 2022-06-03 | End: 2022-10-04 | Stop reason: SDUPTHER

## 2022-06-03 ASSESSMENT — ENCOUNTER SYMPTOMS
RHINORRHEA: 0
SHORTNESS OF BREATH: 0
ABDOMINAL PAIN: 0
BACK PAIN: 0
SINUS PAIN: 0
COUGH: 0
NAUSEA: 0
DIARRHEA: 0
CONSTIPATION: 0
VOMITING: 0

## 2022-06-03 ASSESSMENT — PATIENT HEALTH QUESTIONNAIRE - PHQ9
3. TROUBLE FALLING OR STAYING ASLEEP: 1
6. FEELING BAD ABOUT YOURSELF - OR THAT YOU ARE A FAILURE OR HAVE LET YOURSELF OR YOUR FAMILY DOWN: 1
2. FEELING DOWN, DEPRESSED OR HOPELESS: 0
1. LITTLE INTEREST OR PLEASURE IN DOING THINGS: 0
SUM OF ALL RESPONSES TO PHQ QUESTIONS 1-9: 3
7. TROUBLE CONCENTRATING ON THINGS, SUCH AS READING THE NEWSPAPER OR WATCHING TELEVISION: 0
8. MOVING OR SPEAKING SO SLOWLY THAT OTHER PEOPLE COULD HAVE NOTICED. OR THE OPPOSITE, BEING SO FIGETY OR RESTLESS THAT YOU HAVE BEEN MOVING AROUND A LOT MORE THAN USUAL: 1
9. THOUGHTS THAT YOU WOULD BE BETTER OFF DEAD, OR OF HURTING YOURSELF: 0
10. IF YOU CHECKED OFF ANY PROBLEMS, HOW DIFFICULT HAVE THESE PROBLEMS MADE IT FOR YOU TO DO YOUR WORK, TAKE CARE OF THINGS AT HOME, OR GET ALONG WITH OTHER PEOPLE: 1
SUM OF ALL RESPONSES TO PHQ9 QUESTIONS 1 & 2: 0
SUM OF ALL RESPONSES TO PHQ QUESTIONS 1-9: 3
5. POOR APPETITE OR OVEREATING: 0
SUM OF ALL RESPONSES TO PHQ QUESTIONS 1-9: 3
4. FEELING TIRED OR HAVING LITTLE ENERGY: 0
SUM OF ALL RESPONSES TO PHQ QUESTIONS 1-9: 3

## 2022-06-03 NOTE — PROGRESS NOTES
[facility] PRIMARY CARE  [address] [facility]  [address]  [address]  [address] [address]  Dept: [phone]  Dept Fax: [phone]    Forrest Garvin is a 40 y.o. female who presents today for her medical conditions/complaints as noted below. Chief Complaint   Patient presents with    Depression       HPI:     Patient presents to the office for medication and depression recheck. At her last visit she was started on Wellbutrin for management of chronic depression. Since last visit patient reports that she has had some improvement in the quality of depression. She is noting less bad/dark days. She is having more positive and good days. She is having less mood swings and excessive tearing. She is able to have energy and interested in activities. She feels this Wellbutrin is more effective than prior depression medications. Denies any significant side effects. Of note, patient has recently been evaluated by neurology. They are going to discontinue Topamax and start amitriptyline for chronic migraines. They are also going to work-up for demyelinating disease and recheck several labs. Patient is very happy with her current neurologist and changes being made. No other concerns or complaints. BP stable. Weight stable.       Hemoglobin A1C (%)   Date Value   01/18/2022 5.7   01/03/2020 5.3   10/26/2012 4.6             ( goal A1C is < 7)   No results found for: LABMICR  LDL Cholesterol (mg/dL)   Date Value   01/18/2022 80   03/01/2017 116   07/14/2015 130       (goal LDL is <100)   AST (U/L)   Date Value   01/18/2022 12     ALT (U/L)   Date Value   01/18/2022 6     BUN (mg/dL)   Date Value   01/18/2022 7     BP Readings from Last 3 Encounters:   06/03/22 122/78   06/01/22 111/73   04/27/22 124/86          (goal 120/80)    Past Medical History:   Diagnosis Date    Anxiety     Bicornuate uterus     GERD (gastroesophageal reflux disease)     Miscarriage     6 total      Past Surgical History:   Procedure Laterality Date    BREAST REDUCTION SURGERY  10/2016    TUBAL LIGATION  10/2015       Family History   Problem Relation Age of Onset    Depression Mother     Anxiety Disorder Mother     Elevated Lipids Mother      Problems Mother     Kidney Disease Mother         CKD    Other Mother         GERD    Other Father         gallstones    Diabetes Maternal Grandmother        Social History     Tobacco Use    Smoking status: Never Smoker    Smokeless tobacco: Never Used   Substance Use Topics    Alcohol use:  Yes     Alcohol/week: 0.0 standard drinks     Comment: very little      Current Outpatient Medications   Medication Sig Dispense Refill    buPROPion (WELLBUTRIN XL) 150 MG extended release tablet Take 1 tablet by mouth every morning 90 tablet 0    amitriptyline (ELAVIL) 25 mg tablet Take 1 tablet by mouth nightly 90 tablet 1    topiramate (TOPAMAX) 50 mg tablet Take 1.5 tablets by mouth daily 45 tablet 11    LORazepam (ATIVAN) 1 MG tablet       pantoprazole (PROTONIX) 40 MG tablet Take 1 tablet by mouth daily 90 tablet 1    ibuprofen (ADVIL;MOTRIN) 800 MG tablet Take 1 tablet by mouth 2 times daily as needed for Pain 180 tablet 1    albuterol sulfate HFA (VENTOLIN HFA) 108 (90 Base) MCG/ACT inhaler Inhale 2 puffs into the lungs 4 times daily as needed for Wheezing 54 g 1    Spacer/Aero-Holding Chambers GRAHAM 1 Device by Does not apply route daily as needed (with inhaler) 1 each 0    Norethin Ace-Eth Estrad-FE 1-20 MG-MCG(24) CAPS Take 1 capsule by mouth daily 28 capsule     rizatriptan (MAXALT) 10 MG tablet Take 1 tablet by mouth once as needed for Migraine May repeat in 2 hours if needed 10 tablet 3     Current Facility-Administered Medications   Medication Dose Route Frequency Provider Last Rate Last Admin    lidocaine-EPINEPHrine 1 %-1:[id number] injection 0.5 mL  0.5 mL IntraDERmal Once Christian Marquez PA-C         No Known Allergies    Health Maintenance   Topic Date Due    Flu vaccine (Season Ended) 09/23/2022 (Originally 9/1/2022)    COVID-19 Vaccine (1) 09/23/2022 (Originally 4/28/1990)    Hepatitis C screen  09/23/2022 (Originally 4/28/2003)    Cervical cancer screen  07/22/2022    Depression Monitoring  09/23/2022    A1C test (Diabetic or Prediabetic)  01/18/2023    DTaP/Tdap/Td vaccine (2 - Td or Tdap) 07/09/2025    HIV screen  Completed    Hepatitis A vaccine  Aged Out    Hepatitis B vaccine  Aged Out    Hib vaccine  Aged Out    Meningococcal (ACWY) vaccine  Aged Out    Pneumococcal 0-64 years Vaccine  Aged Out    Varicella vaccine  Discontinued       Subjective:      Review of Systems   Constitutional: Negative for chills, fatigue and fever. HENT: Negative for congestion, rhinorrhea and sinus pain. Respiratory: Negative for cough and shortness of breath. Cardiovascular: Negative for chest pain and leg swelling. Gastrointestinal: Negative for abdominal pain, constipation, diarrhea, nausea and vomiting. Genitourinary: Negative for difficulty urinating, frequency and urgency. Musculoskeletal: Negative for arthralgias, back pain and myalgias. Neurological: Negative for dizziness and headaches. Psychiatric/Behavioral: Positive for dysphoric mood (improving). Negative for confusion and sleep disturbance. The patient is not nervous/anxious. All other systems reviewed and are negative. Objective:     Physical Exam  Vitals and nursing note reviewed. Constitutional:       General: She is not in acute distress. Appearance: Normal appearance. HENT:      Head: Normocephalic. Mouth/Throat:      Mouth: Mucous membranes are moist.   Eyes:      Extraocular Movements: Extraocular movements intact. Conjunctiva/sclera: Conjunctivae normal.      Pupils: Pupils are equal, round, and reactive to light. Cardiovascular:      Rate and Rhythm: Normal rate and regular rhythm. Pulses: Normal pulses.       Heart sounds: Normal heart sounds. Pulmonary:      Effort: Pulmonary effort is normal.      Breath sounds: Normal breath sounds. Abdominal:      General: Abdomen is flat. Bowel sounds are normal.      Palpations: Abdomen is soft. Tenderness: There is no abdominal tenderness. Musculoskeletal:      Cervical back: Normal range of motion. Right lower leg: No edema. Left lower leg: No edema. Lymphadenopathy:      Cervical: No cervical adenopathy. Skin:     General: Skin is warm. Capillary Refill: Capillary refill takes less than 2 seconds. Neurological:      General: No focal deficit present. Mental Status: She is alert and oriented to person, place, and time. Psychiatric:         Mood and Affect: Mood normal.         Behavior: Behavior normal.       /78 (Site: Left Upper Arm, Position: Sitting, Cuff Size: Medium Adult)   Pulse 92   Resp 16   Ht 4' 10\" (1.473 m)   Wt 180 lb 6.4 oz (81.8 kg)   SpO2 99%   Breastfeeding No   BMI 37.70 kg/m²     Assessment:       ICD-10-CM    1. Moderate episode of recurrent major depressive disorder (HCC)  F33.1 buPROPion (WELLBUTRIN XL) 150 MG extended release tablet   2. B12 deficiency  E53.8    3. Migraine with aura and without status migrainosus, not intractable  G43. 109             Plan:       1. Depression has improved since starting Wellbutrin. Plan to continue current 150 mg extended release once daily. I encouraged her to keep appointments with counseling department. 2.  She is to continue daily multivitamin. Vitamin B12 is going to be rechecked with neurology. 3.  She is to continue following with neurology. Continue medicine changes as recommended. Return in about 4 months (around 10/3/2022) for medication recheck. No orders of the defined types were placed in this encounter. Patient given educational materials - see patient instructions. Discussed use, benefit, and side effects of prescribed medications.   All patient questions answered. Pt voiced understanding. Reviewed health maintenance. Instructed to continue current medications, diet and exercise. Patient agreed with treatment plan. Follow up as directed.         Electronically signed by Najma Myles PA-C on 6/3/2022 at 9:29 AM

## 2022-06-07 ENCOUNTER — HOSPITAL ENCOUNTER (OUTPATIENT)
Age: 37
Setting detail: SPECIMEN
Discharge: HOME | End: 2022-06-07

## 2022-06-07 DIAGNOSIS — E53.8 B12 DEFICIENCY: ICD-10-CM

## 2022-06-07 DIAGNOSIS — E55.9 VITAMIN D DEFICIENCY: ICD-10-CM

## 2022-06-07 DIAGNOSIS — G37.9 DEMYELINATING CHANGES IN BRAIN (HCC): ICD-10-CM

## 2022-06-07 LAB
ANGIOTENSIN-CONVERTING ENZYME: 23 U/L (ref 8–52)
C-REACTIVE PROTEIN: 15.4 MG/L (ref 0–5)
FOLATE: 14.6 NG/ML
SEDIMENTATION RATE, ERYTHROCYTE: 32 MM/HR (ref 0–20)
TSH SERPL DL<=0.05 MIU/L-ACNC: 1.16 UIU/ML (ref 0.3–5)
VITAMIN B-12: 327 PG/ML (ref 232–1245)
VITAMIN D 25-HYDROXY: 33.8 NG/ML

## 2022-06-08 LAB
ESTIMATED AVERAGE GLUCOSE: 120 MG/DL
HBA1C MFR BLD: 5.8 % (ref 4–6)

## 2022-06-09 LAB
ANTI DNA DOUBLE STRANDED: <0.5 IU/ML
ANTI-NUCLEAR ANTIBODY (ANA): NEGATIVE
ENA ANTIBODIES SCREEN: 0.2 U/ML

## 2022-06-14 NOTE — PROGRESS NOTES
ZACK Coates  Licensed Independent      Visit Date: 6/3/2022   Time of appointment: 10am   Time spent with Patient: 60 minutes. This is patient's first appointment. Reason for Consult:  New Patient     PCP: Bobby Churchill PA-C      Pt and/or guardian was educated on the model of service to include a short-term intervention focused approach and as well as the limits of confidentiality (i.e. abuse reporting, suicide intervention, etc.). Pt and/or guardian indicated understanding. Pt and/or guardian provided informed consent for the behavioral health program.  Visit completed in person. Patient presented alone. Patient Location: [facility] [place] [facility], [facility]  Provider Location: [place] [facility], Epi Meek is a 40 y.o. female who presents for new evaluation and treatment of depression. She reports the following symptoms: depressed mood, excessive crying, excessive guilt, low self-esteem, feelings of worthlessness, suicidal thoughts without plan and relationship issues. Pt states experience of suicidal thoughts were fleeting and are not continuing to occur but that having the experience  (about a month or so ago) prompted her to seek treatment. Onset of symptoms was approximately several years ago, worsening last year. Symptoms occur daily and have been gradually worsening since onset. Angel Dillard reports that her main concern and focus for the referral to [facility] is to improve self esteem.     CURRENT MEDICATIONS    Current Outpatient Medications:     buPROPion (WELLBUTRIN XL) 150 MG extended release tablet, Take 1 tablet by mouth every morning, Disp: 90 tablet, Rfl: 0    amitriptyline (ELAVIL) 25 mg tablet, Take 1 tablet by mouth nightly, Disp: 90 tablet, Rfl: 1    topiramate (TOPAMAX) 50 mg tablet, Take 1.5 tablets by mouth daily, Disp: 45 tablet, Rfl: 11    rizatriptan (MAXALT) 10 MG tablet, Take 1 tablet by mouth once as needed for Migraine May repeat in 2 hours if needed, Disp: 10 tablet, Rfl: 3    LORazepam (ATIVAN) 1 MG tablet, , Disp: , Rfl:     pantoprazole (PROTONIX) 40 MG tablet, Take 1 tablet by mouth daily, Disp: 90 tablet, Rfl: 1    ibuprofen (ADVIL;MOTRIN) 800 MG tablet, Take 1 tablet by mouth 2 times daily as needed for Pain, Disp: 180 tablet, Rfl: 1    albuterol sulfate HFA (VENTOLIN HFA) 108 (90 Base) MCG/ACT inhaler, Inhale 2 puffs into the lungs 4 times daily as needed for Wheezing, Disp: 54 g, Rfl: 1    Spacer/Aero-Holding Chambers GRAHAM, 1 Device by Does not apply route daily as needed (with inhaler), Disp: 1 each, Rfl: 0    Norethin Ace-Eth Estrad-FE 1-20 MG-MCG(24) CAPS, Take 1 capsule by mouth daily, Disp: 28 capsule, Rfl:      FAMILY MEDICAL/MH HISTORY   Her family history includes Anxiety Disorder in her mother; Depression in her mother; Diabetes in her maternal grandmother; Elevated Lipids in her mother;  Problems in her mother; Kidney Disease in her mother; Other in her father and mother. PATIENT MENTAL HEALTH HISTORY  Jonnie Robledo reports a previous diagnosis of depression, anxiety. Previous treatment includes medication only. She is currently taking wellbutrin and elavil and complains of the following medication side effects: none. PSYCHOSOCIAL HISTORY  Jonnie Robledo is currently living with  and their daughter. She is employed. She reports a previous history of trauma in childhood  Support system: , family - expresses supports are limited in their ability to be helpful    DRUG AND ALCOHOL CURRENT USE/HISTORY  TOBACCO:  She reports that she has never smoked. She has never used smokeless tobacco.  ALCOHOL:  She reports current alcohol use. OTHER SUBSTANCES: She reports no history of drug use. MENTAL STATUS EXAM  Affective and emotional state appeared sad. Suicidal ideation was denied. Homicidal ideation was denied.    Hygiene was good Dress was appropriate  Behavior was Calm and engaged  Attitude was Cooperative. Eye-contact was good . Speech is described as normal rate, normal volume and well articulated  Thought processes were logical without evidence of delusions, hallucinations, obsessions, or jamar  Attention span and concentration appear within functional limits  Language use and knowledge base appear within functional limits  Pt was oriented to person, place, time, and general circumstances with recent memory:  good and remote memory:  good. Insight is estimated to be good AEB a good  understanding of cyclical maladaptive patterns, and the ability to use insight to inform behavior change. Judgment was estimated to be good    PHQ Scores 6/3/2022 9/23/2021 1/2/2020 4/22/2019 4/27/2018 2/28/2017   PHQ2 Score 0 0 4 0 1 0   PHQ9 Score 3 0 10 0 1 0     Interpretation of Total Score Depression Severity: 1-4 = Minimal depression, 5-9 = Mild depression, 10-14 = Moderate depression, 15-19 = Moderately severe depression, 20-27 = Severe depression    ASSESSMENT  Akira Bocanegra presented to the appointment today for evaluation and treatment of symptoms of depression. She is currently deemed no risk to herself or others and meets criteria for:  Major Depressive Disorder, recurrent, moderate, AEB depressed most of the day, nearly every day, feelings of worthlessness, excessive or inappropriate guilt and crying spells, fleeting thoughts of suicide without a plan (not current). She will benefit from will benefit from brief and solution-focused consultation to address cognitive and behavioral interventions for depression symptoms and continue to follow up with PCP for medications. Kaylah and/or guardian were in agreement with recommendations.     INTERVENTION  orienting pt to process of brief behavioral health services, completing initial assessment of symptoms and needs, provided recommendations and rapport building    DIAGNOSIS  Kaylah was seen today for new patient. Diagnoses and all orders for this visit:    Moderate episode of recurrent major depressive disorder ([facility] [address]        PLAN  Pt will follow up with PCP for medication management of depression  Follow up in 2 weeks with Krista Johnson 88  Is interactive complexity present?   No  Reason:  N/A  Additional Supporting Information:  N/A       Electronically signed by Bonita Hein on 6/14/2022 at 11:16 AM

## 2022-06-16 ENCOUNTER — HOSPITAL ENCOUNTER (OUTPATIENT)
Dept: MRI IMAGING | Age: 37
Discharge: HOME | End: 2022-06-18
Payer: COMMERCIAL

## 2022-06-16 DIAGNOSIS — G37.9 DEMYELINATING CHANGES IN BRAIN (HCC): ICD-10-CM

## 2022-06-16 PROCEDURE — 6360000004 HC RX CONTRAST MEDICATION: Performed by: STUDENT IN AN ORGANIZED HEALTH CARE EDUCATION/TRAINING PROGRAM

## 2022-06-16 PROCEDURE — 72157 MRI CHEST SPINE W/O & W/DYE: CPT

## 2022-06-16 PROCEDURE — 72156 MRI NECK SPINE W/O & W/DYE: CPT

## 2022-06-16 PROCEDURE — 70553 MRI BRAIN STEM W/O & W/DYE: CPT

## 2022-06-16 PROCEDURE — A9579 GAD-BASE MR CONTRAST NOS,1ML: HCPCS | Performed by: STUDENT IN AN ORGANIZED HEALTH CARE EDUCATION/TRAINING PROGRAM

## 2022-06-16 RX ADMIN — GADOTERIDOL 17 ML: 279.3 INJECTION, SOLUTION INTRAVENOUS at 16:16

## 2022-06-21 ENCOUNTER — OFFICE VISIT (OUTPATIENT)
Dept: BEHAVIORAL/MENTAL HEALTH CLINIC | Age: 37
End: 2022-06-21
Payer: COMMERCIAL

## 2022-06-21 ENCOUNTER — HOSPITAL ENCOUNTER (OUTPATIENT)
Dept: SLEEP CENTER | Age: 37
Discharge: HOME | End: 2022-06-23
Payer: COMMERCIAL

## 2022-06-21 DIAGNOSIS — R06.83 SNORING: ICD-10-CM

## 2022-06-21 DIAGNOSIS — Z01.89 NEED FOR ASSESSMENT FOR SLEEP APNEA: ICD-10-CM

## 2022-06-21 DIAGNOSIS — F33.1 MODERATE EPISODE OF RECURRENT MAJOR DEPRESSIVE DISORDER (HCC): Primary | ICD-10-CM

## 2022-06-21 PROCEDURE — G0399 HOME SLEEP TEST/TYPE 3 PORTA: HCPCS

## 2022-06-21 PROCEDURE — 1036F TOBACCO NON-USER: CPT | Performed by: SOCIAL WORKER

## 2022-06-21 PROCEDURE — 90837 PSYTX W PT 60 MINUTES: CPT | Performed by: SOCIAL WORKER

## 2022-06-21 NOTE — PROGRESS NOTES
BEHAVIORAL HEALTH FOLLOW UP  Christopher Kira Vaughn Consultant      Visit Date: 6/21/2022   Time of appointment:  10am   Time spent with Patient: 60 minutes. This is patient's second appointment. Pt and/or guardian was educated on the model of service to include a short-term intervention focused approach and as well as the limits of confidentiality (i.e. abuse reporting, suicide intervention, etc.). Pt and/or guardian indicated understanding. Pt and/or guardian provided informed consent for the behavioral health program.  Visit completed in person. Patient presented alone. Patient Location: [facility], [facility]  Provider Location: [facility], [facility]    PCP: Paradise Martinez PA-C      Reason for Consult: Follow-up    to address pt's Behavioral Health goal: \"improve self esteem\"      BREANA Lopez is a 40 y.o. female who presents for follow up of depression. She discusses long term impact of relationship with mother on her self image and beliefs about why she needs to avoid disappointing others. OBJECTIVE  Affective and emotional state appeared tearful. Suicidal thoughts or behaviors not present  Homicidal thoughts or behaviors not present  Hygiene was good   Dress was appropriate  Behavior was Calm and engaged  Attitude was Cooperative. Eye-contact was good . Speech is described as normal rate, normal volume and well articulated  Thought processes were logical without evidence of delusions, hallucinations, obsessions, or jamar  Pt was oriented to person, place, time, and general circumstances with recent memory:  good and remote memory:  good.   Insight and judgment are estimated to be good     PHQ Scores 6/3/2022 9/23/2021 1/2/2020 4/22/2019 4/27/2018 2/28/2017   PHQ2 Score 0 0 4 0 1 0   PHQ9 Score 3 0 10 0 1 0     Interpretation of Total Score Depression Severity: 1-4 = Minimal depression, 5-9 = Mild depression, 10-14 = Moderate depression, 15-19 = Moderately severe depression, 20-27 = Severe depression    ASSESSMENT  Valeria Johnson presented to the appointment today for follow up and treatment of depression. She is currently deemed no risk to self or others. The main focus or themes of the visit today included work towards improved self-esteem and confidence, expression and discussion of unhelpful thinking patterns or schemas and coping with relationship difficulties. Santos Calderon is making progress with current treatment. She would benefit from further behavioral health consultation to address depression. Kaylah was in agreement with recommendations. DIAGNOSIS  Kaylah was seen today for follow-up. Diagnoses and all orders for this visit:    Moderate episode of recurrent major depressive disorder [facility])        INTERVENTION  Therapeutic strategies included building awareness, evaluation and challenging of problematic thinking patterns and guided discovery. PLAN  Pt will practice challenging some identified unhelpful thinking patterns  Follow up in 2 weeks with [address] [place] [address]  Is interactive complexity present?  No  Reason:  N/A  Additional Supporting Information:  N/A       Electronically signed by Hesham Dominguez on 6/21/2022 at 11:37 AM

## 2022-06-22 ENCOUNTER — TELEPHONE (OUTPATIENT)
Dept: NEUROLOGY | Age: 37
End: 2022-06-22

## 2022-06-22 DIAGNOSIS — G37.9 DEMYELINATING CHANGES IN BRAIN (HCC): Primary | ICD-10-CM

## 2022-06-22 NOTE — TELEPHONE ENCOUNTER
Kaylah called the office and this information was given. Patient verbally stated her understanding and would like to proceed with the LP. I told her the orders were already placed by the doctor. I will call IR at [facility]. V[facility] tomorrow to make sure they have this information and let her know. I will also speak with Mendoza Pham RN tomorrow regarding the lab work. Kaylah verbally stated her understanding.

## 2022-06-22 NOTE — TELEPHONE ENCOUNTER
----- Message from Melanie Akins MD sent at 6/22/2022  3:14 PM EDT -----  Please let patient know that there are nonspecific white matter change in her brain which sometimes can also be due do multiple sclerosis. I would like to do a lumbar puncture and further lab workup to assess for MS.  C spine and thoracic spine were normal.

## 2022-06-23 ENCOUNTER — TELEPHONE (OUTPATIENT)
Dept: INTERVENTIONAL RADIOLOGY/VASCULAR | Age: 37
End: 2022-06-23

## 2022-06-27 DIAGNOSIS — F41.9 ANXIETY: Primary | ICD-10-CM

## 2022-06-27 RX ORDER — LORAZEPAM 1 MG/1
1 TABLET ORAL
Qty: 1 TABLET | Refills: 0 | Status: SHIPPED | OUTPATIENT
Start: 2022-06-27 | End: 2022-06-27

## 2022-07-05 ENCOUNTER — HOSPITAL ENCOUNTER (OUTPATIENT)
Dept: INTERVENTIONAL RADIOLOGY/VASCULAR | Age: 37
Discharge: HOME | End: 2022-07-07
Payer: COMMERCIAL

## 2022-07-05 VITALS
TEMPERATURE: 97.1 F | HEART RATE: 101 BPM | RESPIRATION RATE: 18 BRPM | SYSTOLIC BLOOD PRESSURE: 121 MMHG | OXYGEN SATURATION: 96 % | DIASTOLIC BLOOD PRESSURE: 81 MMHG

## 2022-07-05 DIAGNOSIS — G37.9 DEMYELINATING CHANGES IN BRAIN (HCC): ICD-10-CM

## 2022-07-05 LAB
ALBUMIN CSF: 240 MG/L (ref 70–350)
ALBUMIN INDEX: 55.8
ALBUMIN SERPL-MCNC: 4.3 G/DL (ref 3.5–5.2)
APPEARANCE CSF: CLEAR
CASE NUMBER:: NORMAL
CRYPTOCOCCAL ANTIGEN CSF: NEGATIVE
GLUCOSE, CSF: 69 MG/DL (ref 40–70)
IGG CSF: 2 MG/DL (ref 0.5–7)
IGG INDEX CSF: 0.53
IGG SYNTHESIS RATE CSF: < 3.3 MG/24 H
IGG: 673 MG/DL (ref 700–1600)
INR BLD: 0.9
OLIGOCLONAL BANDS: ABNORMAL
PARTIAL THROMBOPLASTIN TIME: 28.2 SEC (ref 20.5–30.5)
PLATELET # BLD: 336 K/UL (ref 138–453)
PROTEIN CSF: 41.2 MG/DL (ref 15–45)
PROTHROMBIN TIME: 10.1 SEC (ref 9.1–12.3)
RBC CSF: 0 /MM3
SPECIMEN DESCRIPTION: NORMAL
TUBE NUMBER CSF: 3
VDRL CSF SCREEN: NONREACTIVE
VOLUME CSF: 7
WBC CSF: 1 /MM3
XANTHOCHROMIA: NORMAL

## 2022-07-05 PROCEDURE — 82945 GLUCOSE OTHER FLUID: CPT

## 2022-07-05 PROCEDURE — 82784 ASSAY IGA/IGD/IGG/IGM EACH: CPT

## 2022-07-05 PROCEDURE — 88112 CYTOPATH CELL ENHANCE TECH: CPT

## 2022-07-05 PROCEDURE — 2709999900 HC NON-CHARGEABLE SUPPLY

## 2022-07-05 PROCEDURE — 87327 CRYPTOCOCCUS NEOFORM AG IA: CPT

## 2022-07-05 PROCEDURE — 86592 SYPHILIS TEST NON-TREP QUAL: CPT

## 2022-07-05 PROCEDURE — 7100000011 HC PHASE II RECOVERY - ADDTL 15 MIN

## 2022-07-05 PROCEDURE — 89050 BODY FLUID CELL COUNT: CPT

## 2022-07-05 PROCEDURE — 82040 ASSAY OF SERUM ALBUMIN: CPT

## 2022-07-05 PROCEDURE — 62328 DX LMBR SPI PNXR W/FLUOR/CT: CPT

## 2022-07-05 PROCEDURE — 85610 PROTHROMBIN TIME: CPT

## 2022-07-05 PROCEDURE — 86618 LYME DISEASE ANTIBODY: CPT

## 2022-07-05 PROCEDURE — 7100000010 HC PHASE II RECOVERY - FIRST 15 MIN

## 2022-07-05 PROCEDURE — 82042 OTHER SOURCE ALBUMIN QUAN EA: CPT

## 2022-07-05 PROCEDURE — 2580000003 HC RX 258: Performed by: PHYSICIAN ASSISTANT

## 2022-07-05 PROCEDURE — 85730 THROMBOPLASTIN TIME PARTIAL: CPT

## 2022-07-05 PROCEDURE — 84157 ASSAY OF PROTEIN OTHER: CPT

## 2022-07-05 PROCEDURE — 83873 ASSAY OF CSF PROTEIN: CPT

## 2022-07-05 PROCEDURE — 83916 OLIGOCLONAL BANDS: CPT

## 2022-07-05 PROCEDURE — 85049 AUTOMATED PLATELET COUNT: CPT

## 2022-07-05 RX ORDER — SODIUM CHLORIDE 9 MG/ML
INJECTION, SOLUTION INTRAVENOUS CONTINUOUS
Status: DISCONTINUED | OUTPATIENT
Start: 2022-07-05 | End: 2022-07-08 | Stop reason: HOSPADM

## 2022-07-05 RX ORDER — ACETAMINOPHEN 325 MG/1
650 TABLET ORAL EVERY 4 HOURS PRN
Status: DISCONTINUED | OUTPATIENT
Start: 2022-07-05 | End: 2022-07-08 | Stop reason: HOSPADM

## 2022-07-05 RX ADMIN — SODIUM CHLORIDE: 9 INJECTION, SOLUTION INTRAVENOUS at 12:03

## 2022-07-05 ASSESSMENT — PAIN - FUNCTIONAL ASSESSMENT: PAIN_FUNCTIONAL_ASSESSMENT: 0-10

## 2022-07-05 ASSESSMENT — PAIN DESCRIPTION - DESCRIPTORS: DESCRIPTORS: DULL

## 2022-07-05 NOTE — H&P
History and Physical    Pt Name: Saul Capps  MRN: [id number]  YOB: 1985  Date of evaluation: 7/5/2022    SUBJECTIVE:   History of Chief Complaint:    Patient presents preprocedure for lumbar puncture. She reports a long history of joint pain, since childhood. She more recently has been developing numbness/tingling to the hands and feet, also up her legs. She reports continued intermittent joint pain as well. She has been following with neurology and has had MRI imaging of the brain. She has had abnormality she states, demyelinating changes, patient says possible MS. She has been scheduled for lumbar puncture today. Past Medical History    has a past medical history of Abnormal brain MRI, Anxiety, Bicornuate uterus, Diverticulosis, GERD (gastroesophageal reflux disease), Hx of migraine headaches, Miscarriage, and Wears glasses. Past Surgical History   has a past surgical history that includes Tubal ligation (10/2015) and Breast reduction surgery (10/2016). Medications  Prior to Admission medications    Medication Sig Start Date End Date Taking?  Authorizing Provider   buPROPion (WELLBUTRIN XL) 150 MG extended release tablet Take 1 tablet by mouth every morning 6/3/22   Amanda Britton PA-C   amitriptyline (ELAVIL) 25 mg tablet Take 1 tablet by mouth nightly 6/1/22   Neri Fernandes MD   topiramate (TOPAMAX) 50 mg tablet Take 1.5 tablets by mouth daily 6/1/22 6/1/23  Neri Fernandes MD   rizatriptan (MAXALT) 10 MG tablet Take 1 tablet by mouth once as needed for Migraine May repeat in 2 hours if needed 6/1/22 6/1/22  Neri Fernandes MD   pantoprazole (PROTONIX) 40 MG tablet Take 1 tablet by mouth daily 2/17/22 2/17/23  Amanda Britton PA-C   ibuprofen (ADVIL;MOTRIN) 800 MG tablet Take 1 tablet by mouth 2 times daily as needed for Pain 12/13/21   KAYA Villar NP   albuterol sulfate HFA (VENTOLIN HFA) 108 (90 Base) MCG/ACT inhaler Inhale 2 puffs into the lungs 4 times daily as needed for Wheezing  Patient not taking: Reported on 7/5/2022 9/23/21   Antonio Velez MD   Spacer/Aero-Holding Giselle Tommie 1 Device by Does not apply route daily as needed (with inhaler) 9/23/21   Antonio Velez MD   Norejosé luis Ace-Eth Estrad-FE 1-20 MG-MCG(24) CAPS Take 1 capsule by mouth daily 10/26/17   KAYA Mcmillan CNP     Allergies  has No Known Allergies. Family History  family history includes Anxiety Disorder in her mother; Depression in her mother; Diabetes in her maternal grandmother; Elevated Lipids in her mother;  Problems in her mother; Kidney Disease in her mother; Other in her father and mother. Social History   reports that she has never smoked. She has never used smokeless tobacco.   reports current alcohol use. reports no history of drug use. Marital Status   Occupation  at a veternary clinic    Review of Systems:  CONSTITUTIONAL:   negative for fevers, chills, fatigue and malaise    EYES:   Wears glasses but reports blurred vision despite this   HEENT:   negative for tinnitus, epistaxis and sore throat     RESPIRATORY:   negative for cough, shortness of breath, wheezing     CARDIOVASCULAR:   negative for chest pain, palpitations, syncope, edema     GASTROINTESTINAL:   negative for nausea, vomiting     GENITOURINARY:   negative for incontinence     MUSCULOSKELETAL:   Positive for joint pain   NEUROLOGICAL:   Positive for numbness/tingling as described above    PSYCHIATRIC:   negative for anxiety         OBJECTIVE:   VITALS:  temperature is 97.1 °F (36.2 °C). Her blood pressure is 149/98 (abnormal) and her pulse is 109 (abnormal). Her respiration is 18 and oxygen saturation is 97%. CONSTITUTIONAL:alert & cooperative, no acute distress. Pleasant and talkative. SKIN:  Warm and dry, no rashes on exposed areas of skin   HEAD:  Normocephalic, atraumatic   EYES: wearing glasses. EOMs intact. EARS:  Hearing grossly WNL. NOSE:  Nares patent.   No rhinorrhea MOUTH/THROAT:  benign  NECK:good ROM   LUNGS: Clear to auscultation bilaterally, no wheezes. CARDIOVASCULAR: Heart sounds are normal.  Regular rate and rhythm without murmur. ABDOMEN: soft, non tender, non distended. EXTREMITIES: no edema bilateral lower extremities. IMPRESSIONS:   1. Abnormal MRI brain  2.  has a past medical history of Abnormal brain MRI, Anxiety, Bicornuate uterus, Diverticulosis, GERD (gastroesophageal reflux disease), migraine headaches, Miscarriage, and Wears glasses. PLANS:   1.  Lumbar puncture    Vaibhav Bangura PA-C  Electronically signed 7/5/2022 at 12:10 PM

## 2022-07-05 NOTE — BRIEF OP NOTE
Brief Postoperative Note Lumbar Puncture    Hamilton Agustin  YOB: 1985  [id number]    Pre-operative Diagnosis: Demyelinating changes on MRI    Post-operative Diagnosis: Same    Procedure: Fluoro guided Lumbar Puncture    Anesthesia: 1% Lidocaine    Surgeons/Assistants: Citlalli Ybarra PA-C    Complications: None    EBL: Minimal    Specimens: Obtained and sent to lab    Fluoroscopy guided lumbar puncture. 20 gauge spinal needle. L3-L4. 8 ml clear CSF obtained. Dressing applied. Instructions given.     Electronically signed by MARTIN Roberto on 7/5/2022 at 1:11 PM

## 2022-07-06 LAB
B BURGDORFERI AB,CSF: 0.05 LIV
MYELIN BASIC PROTEIN, CSF: 1.65 NG/ML (ref 0–5.5)
STATUS: NORMAL
SURGICAL PATHOLOGY REPORT: NORMAL

## 2022-07-06 PROCEDURE — G0399 HOME SLEEP TEST/TYPE 3 PORTA: HCPCS | Performed by: STUDENT IN AN ORGANIZED HEALTH CARE EDUCATION/TRAINING PROGRAM

## 2022-07-07 LAB
CSF ISOELECTRIC FOCUSING INTERPRETATION: NORMAL
OLIGOCLONAL BANDS NUMBER: 0 BANDS (ref 0–1)
OLIGOCLONAL BANDS: NEGATIVE

## 2022-07-11 ENCOUNTER — TELEPHONE (OUTPATIENT)
Dept: NEUROLOGY | Age: 37
End: 2022-07-11

## 2022-07-11 NOTE — TELEPHONE ENCOUNTER
[facility] called in. She did receive the message that the spinal fluid was normal and not suggestive of demyelinating disease. She doesn't have a follow up until 8/31/22. She wondered if she should be doing anything else in the meantime? She said Dr. Sowmya David had mentioned that some of her symptoms could be rheumatological. I explained Dr. Sowmya David is out of the office this week but I will send her a message to review when she returns.   She voiced understanding

## 2022-07-12 ENCOUNTER — OFFICE VISIT (OUTPATIENT)
Dept: BEHAVIORAL/MENTAL HEALTH CLINIC | Age: 37
End: 2022-07-12
Payer: COMMERCIAL

## 2022-07-12 DIAGNOSIS — F33.1 MODERATE EPISODE OF RECURRENT MAJOR DEPRESSIVE DISORDER (HCC): Primary | ICD-10-CM

## 2022-07-12 PROCEDURE — 90837 PSYTX W PT 60 MINUTES: CPT | Performed by: SOCIAL WORKER

## 2022-07-12 PROCEDURE — 1036F TOBACCO NON-USER: CPT | Performed by: SOCIAL WORKER

## 2022-07-19 NOTE — PROGRESS NOTES
BEHAVIORAL HEALTH FOLLOW UP  Kira Hudson Consultant      Visit Date: 7/12/2022   Time of appointment:  3pm   Time spent with Patient: 55 minutes. This is patient's third appointment. Pt and/or guardian was educated on the model of service to include a short-term intervention focused approach and as well as the limits of confidentiality (i.e. abuse reporting, suicide intervention, etc.). Pt and/or guardian indicated understanding. Pt and/or guardian provided informed consent for the behavioral health program.  Visit completed in person. Patient presented alone. Patient Location: [facility], [facility]  Provider Location: [facility] office, [facility]    PCP: Cecile Mccabe PA-C        Saloni Love is a 40 y.o. female who presents for follow up of depression. She reports symptoms improving. Pt shares that she has been working on doing what she wants to/believes is right to do instead of checking with everyone around her for their opinions and has found positive outcomes of this and support from others for it as well. States that she has noticed  expressing and showing more support in tasks he takes on and loving expression toward her since she has been dealing with unknown medical diagnoses. States in general these changes are helping her feel somewhat better. OBJECTIVE  Affective and emotional state appeared improved. Suicidal thoughts or behaviors not present  Homicidal thoughts or behaviors not present  Hygiene was good   Dress was appropriate  Behavior was Calm and engaged  Attitude was Cooperative. Eye-contact was good . Speech is described as normal rate, normal volume, and well articulated  Thought processes were logical without evidence of delusions, hallucinations, obsessions, or jamar  Pt was oriented to person, place, time, and general circumstances with recent memory:  good and remote memory:  good.   Insight and judgment are estimated to be good     PHQ Scores 6/3/2022 9/23/2021 1/2/2020 4/22/2019 4/27/2018 2/28/2017   PHQ2 Score 0 0 4 0 1 0   PHQ9 Score 3 0 10 0 1 0     Interpretation of Total Score Depression Severity: 1-4 = Minimal depression, 5-9 = Mild depression, 10-14 = Moderate depression, 15-19 = Moderately severe depression, 20-27 = Severe depression      ASSESSMENT  Jono Lazo presented to the appointment today for follow up and treatment of depression. She is currently deemed no risk to self or others. The main focus or themes of the visit today included working through of unhelpful inhibitions and avoidances and reduction in depression and sadness. Willie Lopez is making progress with current treatment. She would benefit from further behavioral health consultation to address depression . Kaylah was in agreement with recommendations. DIAGNOSIS  Kaylah was seen today for follow-up. Diagnoses and all orders for this visit:    Moderate episode of recurrent major depressive disorder ([facility] [address]        INTERVENTION  Therapeutic strategies included encouragement of expression of emotion, development of self-care mindset and commitment to self-care behaviors, restructuring of problematic automatic thoughts and beliefs, practiced/planned exposure practices to reduce unhelpful avoidances, therapeutic feedback regarding development and progress, and review of work done by patient between visits. PLAN  Follow up in 2 weeks with [address] [place] [address]  Is interactive complexity present?  No  Reason:  N/A  Additional Supporting Information:  N/A       Electronically signed by Lizbeth Hastings on 7/19/2022 at 10:22 AM

## 2022-07-25 DIAGNOSIS — M25.50 ARTHRALGIA OF MULTIPLE SITES: Primary | ICD-10-CM

## 2022-07-26 ENCOUNTER — OFFICE VISIT (OUTPATIENT)
Dept: BEHAVIORAL/MENTAL HEALTH CLINIC | Age: 37
End: 2022-07-26
Payer: COMMERCIAL

## 2022-07-26 DIAGNOSIS — F33.1 MODERATE EPISODE OF RECURRENT MAJOR DEPRESSIVE DISORDER (HCC): Primary | ICD-10-CM

## 2022-07-26 PROCEDURE — 1036F TOBACCO NON-USER: CPT | Performed by: SOCIAL WORKER

## 2022-07-26 PROCEDURE — 90837 PSYTX W PT 60 MINUTES: CPT | Performed by: SOCIAL WORKER

## 2022-07-26 NOTE — PROGRESS NOTES
BEHAVIORAL HEALTH FOLLOW UP  Kira Garibay Consultant      Visit Date: 7/26/2022   Time of appointment:  10am   Time spent with Patient: 55 minutes. This is patient's fourth appointment. Pt and/or guardian was educated on the model of service to include a short-term intervention focused approach and as well as the limits of confidentiality (i.e. abuse reporting, suicide intervention, etc.). Pt and/or guardian indicated understanding. Pt and/or guardian provided informed consent for the behavioral health program.  Visit completed in person. Patient presented alone. Patient Location: [facility], [facility]  Provider Location: [facility], [facility]    PCP: Yessi Lucia PA-C    Reason for Consult: Follow-up       SUBJECTIVE  Gabriela Mcdonald is a 40 y.o. female who presents for follow up of depression. She reports symptoms improving. Pt identifies ways she continues to take on decision making herself and reducing reassurance seeking and second guessing. Discusses area of difficulty with spouse and difficulty in being able to reduce accommodating behaviors in this relationship. OBJECTIVE  Affective and emotional state appeared calm. Suicidal thoughts or behaviors not present  Homicidal thoughts or behaviors not present  Hygiene was good   Dress was appropriate  Behavior was Calm and engaged  Attitude was Cooperative. Eye-contact was good . Speech is described as normal rate, normal volume, and well articulated  Thought processes were logical without evidence of delusions, hallucinations, obsessions, or jamar  Pt was oriented to person, place, time, and general circumstances with recent memory:  good and remote memory:  good.   Insight and judgment are estimated to be good     PHQ Scores 6/3/2022 9/23/2021 1/2/2020 4/22/2019 4/27/2018 2/28/2017   PHQ2 Score 0 0 4 0 1 0   PHQ9 Score 3 0 10 0 1 0     Interpretation of Total Score Depression Severity: 1-4 = Minimal depression, 5-9 = Mild depression, 10-14 = Moderate depression, 15-19 = Moderately severe depression, 20-27 = Severe depression      ASSESSMENT  Uzair Wagner presented to the appointment today for follow up and treatment of depression. She is currently deemed no risk to self or others. The main focus or themes of the visit today included work towards improved self-esteem and confidence and working through of unhelpful inhibitions and avoidances. Guera Valdez is making progress with current treatment. She would benefit from further behavioral health consultation to address depression . Kaylah was in agreement with recommendations. DIAGNOSIS  Kaylah was seen today for follow-up. Diagnoses and all orders for this visit:    Moderate episode of recurrent major depressive disorder ([facility] [address]        INTERVENTION  Therapeutic strategies included exploring and encouraging use of practices that can support symptom management and personal growth in daily life , identifying exceptions to the identified problem, therapeutic feedback regarding development and progress, and review of work done by patient between visits. PLAN  Follow up in 2 weeks with [facility]  Is interactive complexity present?  No  Reason:  N/A  Additional Supporting Information:  N/A       Electronically signed by Carlito Guerrero on 7/26/2022 at 11:22 AM

## 2022-08-02 ENCOUNTER — HOSPITAL ENCOUNTER (EMERGENCY)
Facility: CLINIC | Age: 37
Discharge: HOME | End: 2022-08-02
Attending: SPECIALIST
Payer: COMMERCIAL

## 2022-08-02 ENCOUNTER — APPOINTMENT (OUTPATIENT)
Dept: CT IMAGING | Facility: CLINIC | Age: 37
End: 2022-08-02
Payer: COMMERCIAL

## 2022-08-02 VITALS
RESPIRATION RATE: 18 BRPM | BODY MASS INDEX: 37.79 KG/M2 | WEIGHT: 180 LBS | HEIGHT: 58 IN | DIASTOLIC BLOOD PRESSURE: 94 MMHG | TEMPERATURE: 97.9 F | SYSTOLIC BLOOD PRESSURE: 138 MMHG | OXYGEN SATURATION: 97 % | HEART RATE: 88 BPM

## 2022-08-02 DIAGNOSIS — N20.0 KIDNEY STONE: Primary | ICD-10-CM

## 2022-08-02 LAB
-: ABNORMAL
ABSOLUTE EOS #: 0.2 K/UL (ref 0–0.4)
ABSOLUTE LYMPH #: 2.9 K/UL (ref 1–4.8)
ABSOLUTE MONO #: 0.4 K/UL (ref 0.1–1.2)
ANION GAP SERPL CALCULATED.3IONS-SCNC: 12 MMOL/L (ref 9–17)
BACTERIA: ABNORMAL
BASOPHILS # BLD: 0 % (ref 0–2)
BASOPHILS ABSOLUTE: 0 K/UL (ref 0–0.2)
BILIRUBIN URINE: NEGATIVE
BUN BLDV-MCNC: 9 MG/DL (ref 6–20)
CALCIUM SERPL-MCNC: 9.1 MG/DL (ref 8.6–10.4)
CHLORIDE BLD-SCNC: 109 MMOL/L (ref 98–107)
CO2: 21 MMOL/L (ref 20–31)
COLOR: YELLOW
CREAT SERPL-MCNC: 0.9 MG/DL (ref 0.5–0.9)
EOSINOPHILS RELATIVE PERCENT: 2 % (ref 1–4)
EPITHELIAL CELLS UA: ABNORMAL /HPF (ref 0–5)
GFR AFRICAN AMERICAN: >60 ML/MIN
GFR NON-AFRICAN AMERICAN: >60 ML/MIN
GFR SERPL CREATININE-BSD FRML MDRD: ABNORMAL ML/MIN/{1.73_M2}
GLUCOSE BLD-MCNC: 127 MG/DL (ref 70–99)
GLUCOSE URINE: NEGATIVE
HCG QUALITATIVE: NEGATIVE
HCT VFR BLD CALC: 37.3 % (ref 36–46)
HEMOGLOBIN: 12.1 G/DL (ref 12–16)
KETONES, URINE: NEGATIVE
LEUKOCYTE ESTERASE, URINE: NEGATIVE
LYMPHOCYTES # BLD: 30 % (ref 24–44)
MCH RBC QN AUTO: 26.5 PG (ref 26–34)
MCHC RBC AUTO-ENTMCNC: 32.5 G/DL (ref 31–37)
MCV RBC AUTO: 81.4 FL (ref 80–100)
MONOCYTES # BLD: 4 % (ref 2–11)
NITRITE, URINE: NEGATIVE
OTHER OBSERVATIONS UA: ABNORMAL
PDW BLD-RTO: 13.6 % (ref 12.5–15.4)
PH UA: 6 (ref 5–8)
PLATELET # BLD: 317 K/UL (ref 140–450)
PMV BLD AUTO: 6.6 FL (ref 6–12)
POTASSIUM SERPL-SCNC: 3.6 MMOL/L (ref 3.7–5.3)
PROTEIN UA: ABNORMAL
RBC # BLD: 4.58 M/UL (ref 4–5.2)
RBC UA: ABNORMAL /HPF (ref 0–2)
SEG NEUTROPHILS: 64 % (ref 36–66)
SEGMENTED NEUTROPHILS ABSOLUTE COUNT: 6.2 K/UL (ref 1.8–7.7)
SODIUM BLD-SCNC: 142 MMOL/L (ref 135–144)
SPECIFIC GRAVITY UA: 1.03 (ref 1–1.03)
TURBIDITY: CLEAR
URINE HGB: ABNORMAL
UROBILINOGEN, URINE: NORMAL
WBC # BLD: 9.6 K/UL (ref 3.5–11)
WBC UA: ABNORMAL /HPF (ref 0–5)

## 2022-08-02 PROCEDURE — 96375 TX/PRO/DX INJ NEW DRUG ADDON: CPT

## 2022-08-02 PROCEDURE — 80048 BASIC METABOLIC PNL TOTAL CA: CPT

## 2022-08-02 PROCEDURE — 96374 THER/PROPH/DIAG INJ IV PUSH: CPT

## 2022-08-02 PROCEDURE — 84703 CHORIONIC GONADOTROPIN ASSAY: CPT

## 2022-08-02 PROCEDURE — 99284 EMERGENCY DEPT VISIT MOD MDM: CPT

## 2022-08-02 PROCEDURE — 6360000002 HC RX W HCPCS: Performed by: SPECIALIST

## 2022-08-02 PROCEDURE — 81001 URINALYSIS AUTO W/SCOPE: CPT

## 2022-08-02 PROCEDURE — 74176 CT ABD & PELVIS W/O CONTRAST: CPT

## 2022-08-02 PROCEDURE — 6370000000 HC RX 637 (ALT 250 FOR IP): Performed by: SPECIALIST

## 2022-08-02 PROCEDURE — 2580000003 HC RX 258: Performed by: SPECIALIST

## 2022-08-02 PROCEDURE — 36415 COLL VENOUS BLD VENIPUNCTURE: CPT

## 2022-08-02 PROCEDURE — 85025 COMPLETE CBC W/AUTO DIFF WBC: CPT

## 2022-08-02 RX ORDER — OXYCODONE HYDROCHLORIDE AND ACETAMINOPHEN 5; 325 MG/1; MG/1
1 TABLET ORAL EVERY 6 HOURS PRN
Qty: 15 TABLET | Refills: 0 | Status: SHIPPED | OUTPATIENT
Start: 2022-08-02 | End: 2022-08-07

## 2022-08-02 RX ORDER — HYDROMORPHONE HYDROCHLORIDE 1 MG/ML
1 INJECTION, SOLUTION INTRAMUSCULAR; INTRAVENOUS; SUBCUTANEOUS ONCE
Status: COMPLETED | OUTPATIENT
Start: 2022-08-02 | End: 2022-08-02

## 2022-08-02 RX ORDER — 0.9 % SODIUM CHLORIDE 0.9 %
500 INTRAVENOUS SOLUTION INTRAVENOUS ONCE
Status: COMPLETED | OUTPATIENT
Start: 2022-08-02 | End: 2022-08-02

## 2022-08-02 RX ORDER — FENTANYL CITRATE 50 UG/ML
50 INJECTION, SOLUTION INTRAMUSCULAR; INTRAVENOUS ONCE
Status: COMPLETED | OUTPATIENT
Start: 2022-08-02 | End: 2022-08-02

## 2022-08-02 RX ORDER — TAMSULOSIN HYDROCHLORIDE 0.4 MG/1
0.4 CAPSULE ORAL DAILY
Qty: 5 CAPSULE | Refills: 0 | Status: SHIPPED | OUTPATIENT
Start: 2022-08-02 | End: 2022-10-04 | Stop reason: ALTCHOICE

## 2022-08-02 RX ORDER — ONDANSETRON 2 MG/ML
4 INJECTION INTRAMUSCULAR; INTRAVENOUS ONCE
Status: COMPLETED | OUTPATIENT
Start: 2022-08-02 | End: 2022-08-02

## 2022-08-02 RX ORDER — TAMSULOSIN HYDROCHLORIDE 0.4 MG/1
0.4 CAPSULE ORAL ONCE
Status: COMPLETED | OUTPATIENT
Start: 2022-08-02 | End: 2022-08-02

## 2022-08-02 RX ORDER — ONDANSETRON 4 MG/1
4 TABLET, ORALLY DISINTEGRATING ORAL EVERY 8 HOURS PRN
Qty: 12 TABLET | Refills: 0 | Status: SHIPPED | OUTPATIENT
Start: 2022-08-02

## 2022-08-02 RX ADMIN — TAMSULOSIN HYDROCHLORIDE 0.4 MG: 0.4 CAPSULE ORAL at 03:41

## 2022-08-02 RX ADMIN — HYDROMORPHONE HYDROCHLORIDE 1 MG: 1 INJECTION, SOLUTION INTRAMUSCULAR; INTRAVENOUS; SUBCUTANEOUS at 02:54

## 2022-08-02 RX ADMIN — SODIUM CHLORIDE 500 ML: 9 INJECTION, SOLUTION INTRAVENOUS at 02:36

## 2022-08-02 RX ADMIN — FENTANYL CITRATE 50 MCG: 50 INJECTION, SOLUTION INTRAMUSCULAR; INTRAVENOUS at 02:32

## 2022-08-02 RX ADMIN — ONDANSETRON 4 MG: 2 INJECTION INTRAMUSCULAR; INTRAVENOUS at 02:32

## 2022-08-02 ASSESSMENT — PAIN DESCRIPTION - ORIENTATION
ORIENTATION: LEFT

## 2022-08-02 ASSESSMENT — PAIN SCALES - GENERAL
PAINLEVEL_OUTOF10: 7
PAINLEVEL_OUTOF10: 4
PAINLEVEL_OUTOF10: 9
PAINLEVEL_OUTOF10: 9
PAINLEVEL_OUTOF10: 4

## 2022-08-02 ASSESSMENT — PAIN DESCRIPTION - FREQUENCY: FREQUENCY: CONTINUOUS

## 2022-08-02 ASSESSMENT — PAIN DESCRIPTION - LOCATION
LOCATION: ABDOMEN;FLANK
LOCATION: FLANK;ABDOMEN
LOCATION: FLANK
LOCATION: ABDOMEN;FLANK
LOCATION: ABDOMEN;FLANK

## 2022-08-02 ASSESSMENT — PAIN DESCRIPTION - PAIN TYPE: TYPE: ACUTE PAIN

## 2022-08-02 ASSESSMENT — PAIN DESCRIPTION - DESCRIPTORS: DESCRIPTORS: SQUEEZING;TIGHTNESS

## 2022-08-02 ASSESSMENT — PAIN - FUNCTIONAL ASSESSMENT: PAIN_FUNCTIONAL_ASSESSMENT: 0-10

## 2022-08-02 NOTE — ED TRIAGE NOTES
Pt comes to the ED as a walk in accompanied by  w/ c/o left flank pain that radiates to her abdomen. Pt reports that the pain started Monday early morning and has continued to worsen. Pt reports that she frequently feels like she has to urinate but very little comes out each time and it is painful. Pt reports a history of diverticulitis and has had one kidney stone in the past. Pt resting in bed w/ no s/s of distress.  Patient denies any needs at this time and has call light within reach, will continue to monitor

## 2022-08-02 NOTE — DISCHARGE INSTRUCTIONS
Please understand that at this time there is no evidence for a more serious underlying process, but that early in the process of an illness or injury, an emergency department workup can be falsely reassuring. You should contact your family doctor within the next 48 hours for a follow up appointment    Agapito Olivera!!!    From [facility] ([facility]) and [facility] Emergency Services    On behalf of the Emergency Department staff at [facility]), I would like to thank you for giving us the opportunity to address your health care needs and concerns. We hope that during your visit, our service was delivered in a professional and caring manner. Please keep [facility] ([facility]) in mind as we walk with you down the path to your own personal wellness. Please expect an automated text message or email from us so we can ask a few questions about your health and progress. Based on your answers, a clinician may call you back to offer help and instructions. Please understand that early in the process of an illness or injury, an emergency department workup can be falsely reassuring. If you notice any worsening, changing or persistent symptoms please call your family doctor or return to the ER immediately. Tell us how we did during your visit at [email or web address]   and let us know about your experience

## 2022-08-02 NOTE — ED PROVIDER NOTES
[facility]  [facility]  Phone: [phone]      Pt Name: Jatinder Santana  MRN: [id number]  [place] 1985  Date of evaluation: 8/2/2022      CHIEF COMPLAINT       Chief Complaint   Patient presents with    Flank Pain     Left flank pain radiates everywhere     Abdominal Pain         HISTORY OF PRESENT ILLNESS    Jatinder Santana is a 40 y.o. female who presents   Chief Complaint   Patient presents with    Flank Pain     Left flank pain radiates everywhere     Abdominal Pain   . 40-year-old female patient presents to the emergency department for evaluation of left flank pain since 6 AM yesterday morning radiating to the left mid and lower abdomen associated with nausea and 2 episodes of vomiting. She also has been having urinary urgency with decreased urine output but denies any dysuria or hematuria. She denies any constipation or diarrhea and denies any fever or chills. She has taken Motrin at 11:30 PM without any relief. She has history of kidney stones and passed the kidney stone in 2021. She has never required any procedure for the removal of kidney stone. She denies any lightheadedness or dizziness. No history of fall or injuries. She denies any cough, chest pain, shortness of breath, palpitations or diaphoresis. There are no exacerbating or relieving factors. REVIEW OF SYSTEMS       Review of Systems    All systems reviewed and negative unless noted in HPI. The patient denies fever or constitutional symptoms. Denies any sore throat or rhinorrhea. Denies any neck pain or stiffness. Denies chest pain or shortness of breath. Left flank pain radiating to the left abdomen associated with nausea,  vomiting    Admits to urinary urgency with decreased urine output, denies any dysuria or hematuria. Denies musculoskeletal injury or pain. Denies any weakness, numbness or focal neurologic deficit. Denies any skin rash or edema.     No easy bruising or bleeding. Denies any polyuria, polydypsia       PAST MEDICAL HISTORY    has a past medical history of Abnormal brain MRI, Anxiety, Bicornuate uterus, Diverticulosis, GERD (gastroesophageal reflux disease), Hx of migraine headaches, Miscarriage, and Wears glasses. SURGICAL HISTORY      has a past surgical history that includes Tubal ligation (10/2015) and Breast reduction surgery (10/2016). CURRENT MEDICATIONS       Discharge Medication List as of 8/2/2022  3:39 AM        CONTINUE these medications which have NOT CHANGED    Details   buPROPion (WELLBUTRIN XL) 150 MG extended release tablet Take 1 tablet by mouth every morning, Disp-90 tablet, R-0Normal      amitriptyline (ELAVIL) 25 mg tablet Take 1 tablet by mouth nightly, Disp-90 tablet, R-1Normal      topiramate (TOPAMAX) 50 mg tablet Take 1.5 tablets by mouth daily, Disp-45 tablet, R-11Normal      rizatriptan (MAXALT) 10 MG tablet Take 1 tablet by mouth once as needed for Migraine May repeat in 2 hours if needed, Disp-10 tablet, R-3Normal      pantoprazole (PROTONIX) 40 MG tablet Take 1 tablet by mouth daily, Disp-90 tablet, R-1Normal      ibuprofen (ADVIL;MOTRIN) 800 MG tablet Take 1 tablet by mouth 2 times daily as needed for Pain, Disp-180 tablet, R-1Normal      albuterol sulfate HFA (VENTOLIN HFA) 108 (90 Base) MCG/ACT inhaler Inhale 2 puffs into the lungs 4 times daily as needed for Wheezing, Disp-54 g, R-1Normal      Spacer/Aero-Holding Chambers GRAHAM DAILY PRN Starting Thu 9/23/2021, Disp-1 each, R-0, Normal      Norethin Ace-Eth Estrad-FE 1-20 MG-MCG(24) CAPS Take 1 capsule by mouth daily, Disp-28 capsuleHistorical Med             ALLERGIES     has No Known Allergies. FAMILY HISTORY     She indicated that the status of her mother is unknown. She indicated that the status of her father is unknown.  She indicated that the status of her maternal grandmother is unknown.     family history includes Anxiety Disorder in her mother; Depression in her mother; Diabetes in her maternal grandmother; Elevated Lipids in her mother;  Problems in her mother; Kidney Disease in her mother; Other in her father and mother. SOCIAL HISTORY      reports that she has never smoked. She has never used smokeless tobacco. She reports current alcohol use. She reports that she does not use drugs. PHYSICAL EXAM     INITIAL VITALS:  height is 4' 10\" (1.473 m) and weight is 81.6 kg (180 lb). Her temperature is 97.9 °F (36.6 °C). Her blood pressure is 138/94 (abnormal) and her pulse is 88. Her respiration is 18 and oxygen saturation is 97%. Physical Exam  Vitals and nursing note reviewed. Constitutional:       General: She is not in acute distress. Appearance: She is well-developed. HENT:      Head: Normocephalic and atraumatic. Nose: Nose normal.   Eyes:      Extraocular Movements: Extraocular movements intact. Pupils: Pupils are equal, round, and reactive to light. Cardiovascular:      Rate and Rhythm: Normal rate and regular rhythm. Heart sounds: Normal heart sounds. No murmur heard. Pulmonary:      Effort: Pulmonary effort is normal. No respiratory distress. Breath sounds: Normal breath sounds. Abdominal:      General: Bowel sounds are normal. There is no distension. Palpations: Abdomen is soft. Tenderness: no abdominal tenderness There is left CVA tenderness. There is no rebound. Negative signs include Zhao's sign and McBurney's sign. Musculoskeletal:      Cervical back: Normal range of motion and neck supple. Skin:     General: Skin is warm and dry. Neurological:      General: No focal deficit present. Mental Status: She is alert and oriented to person, place, and time. Psychiatric:         Behavior: Behavior normal.         Thought Content:  Thought content normal.         DIFFERENTIAL DIAGNOSIS/ MDM:     Left ureterolithiasis, pyelonephritis, musculoskeletal pain, diverticulitis, ovarian cyst    DIAGNOSTIC wall thickening. Diverticulosis without acute diverticulitis. Pelvis: Uterus, adnexal regions and bladder appear unremarkable. No free fluid in the pelvis. No bulky pelvic lymphadenopathy. Peritoneum/Retroperitoneum: No abdominal aortic aneurysm. No retroperitoneal or mesenteric lymphadenopathy. Small fat containing umbilical hernia. Bones/Soft Tissues: No acute subcutaneous soft tissue abnormality. No acute osseous abnormality. 1. Mild left-sided hydroureteronephrosis secondary to an obstructive calculus in the distal left ureter measuring 3 mm. 2. Nonobstructive left renal calculi. 3. Cholecystectomy. 4. Small fat containing umbilical hernia. LABS:  Labs Reviewed   BASIC METABOLIC PANEL - Abnormal; Notable for the following components:       Result Value    Glucose 127 (*)     Potassium 3.6 (*)     Chloride 109 (*)     All other components within normal limits   URINALYSIS WITH REFLEX TO CULTURE - Abnormal; Notable for the following components:    Urine Hgb LARGE (*)     Protein, UA 1+ (*)     All other components within normal limits   MICROSCOPIC URINALYSIS - Abnormal; Notable for the following components:    Other Observations UA   (*)     Value: Utilizing a urinalysis as the only screening method to exclude a potential uropathogen can be unreliable in many patient populations. Rapid screening tests are less sensitive than culture and if UTI is a clinical possibility, culture should be considered despite a negative urinalysis.       All other components within normal limits   CBC WITH AUTO DIFFERENTIAL   HCG, SERUM, QUALITATIVE       Patient has microscopic hematuria, rest of the lab work is unremarkable    EMERGENCY DEPARTMENT COURSE:   Vitals:    Vitals:    08/02/22 0214   BP: (!) 138/94   Pulse: 88   Resp: 18   Temp: 97.9 °F (36.6 °C)   SpO2: 97%   Weight: 81.6 kg (180 lb)   Height: 4' 10\" (1.473 m)     -------------------------  BP: (!) 138/94, Temp: 97.9 °F (36.6 °C), Heart Rate: 88, Resp: 18    Orders Placed This Encounter   Medications    0.9 % sodium chloride bolus    fentaNYL (SUBLIMAZE) injection 50 mcg    ondansetron (ZOFRAN) injection 4 mg    HYDROmorphone HCl PF (DILAUDID) injection 1 mg    tamsulosin (FLOMAX) capsule 0.4 mg    oxyCODONE-acetaminophen (PERCOCET) 5-325 MG per tablet     Sig: Take 1 tablet by mouth every 6 hours as needed for Pain for up to 5 days. Intended supply: 3 days. Take lowest dose possible to manage pain     Dispense:  15 tablet     Refill:  0    ondansetron (ZOFRAN ODT) 4 MG disintegrating tablet     Sig: Take 1 tablet by mouth every 8 hours as needed for Nausea     Dispense:  12 tablet     Refill:  0    tamsulosin (FLOMAX) 0.4 MG capsule     Sig: Take 1 capsule by mouth in the morning for 5 doses. Dispense:  5 capsule     Refill:  0       During the emergency department course, patient was given normal saline 500 mL bolus, fentanyl 50 mcg and Zofran 4 mg IV push. Her nausea is resolved but she continues having pain. She was further given Dilaudid 1 mg IV push and she is feeling much better. Her pain is resolved and she prefers to go home. She was also given Flomax 0.4 mg orally and strainer to void. Plan is to discharge the patient with instructions drink plenty of oral fluids, take Tylenol and/or ibuprofen as needed for the pain, Percocet for uncontrolled pain, Flomax once a day, Zofran ODT as needed for the nausea, follow-up with urologist Dr. Angelic Doshi, call his office tomorrow morning for appointment, return if worse. I have reviewed the disposition diagnosis with the patient and or their family/guardian. I have answered their questions and given discharge instructions. They voiced understanding of these instructions and did not have any further questions or complaints. Re-evaluation Notes    Patient is resting comfortably and does not appear to be in any pain or distress prior to discharge.       PROCEDURES:  None    FINAL IMPRESSION      1. Kidney stone          DISPOSITION/PLAN   DISPOSITION Decision To Discharge 08/02/2022 03:38:04 AM      Condition on Disposition    Stable    PATIENT REFERRED TO:  Dariela Cortez MD  [address] [place] [address] [address]  Joellen Galarza  [phone]    Call in 1 day  For reevaluation of current symptoms    [facility] ED  [address]   [phone]    If symptoms worsen    DISCHARGE MEDICATIONS:  Discharge Medication List as of 8/2/2022  3:39 AM        START taking these medications    Details   oxyCODONE-acetaminophen (PERCOCET) 5-325 MG per tablet Take 1 tablet by mouth every 6 hours as needed for Pain for up to 5 days. Intended supply: 3 days. Take lowest dose possible to manage pain, Disp-15 tablet, R-0Print      ondansetron (ZOFRAN ODT) 4 MG disintegrating tablet Take 1 tablet by mouth every 8 hours as needed for Nausea, Disp-12 tablet, R-0Print      tamsulosin (FLOMAX) 0.4 MG capsule Take 1 capsule by mouth in the morning for 5 doses. , Disp-5 capsule, R-0Print             (Please note that portions of this note were completed with a voice recognition program.  Efforts were made to edit the dictations but occasionally words are mis-transcribed.)    Drew Thomson MD,, MD, F.A.C.E.P.   Attending Emergency Physician       Drew Thomson MD  08/02/22 4590

## 2022-08-31 ENCOUNTER — OFFICE VISIT (OUTPATIENT)
Dept: NEUROLOGY | Age: 37
End: 2022-08-31
Payer: COMMERCIAL

## 2022-08-31 VITALS
DIASTOLIC BLOOD PRESSURE: 84 MMHG | SYSTOLIC BLOOD PRESSURE: 130 MMHG | HEART RATE: 98 BPM | WEIGHT: 183 LBS | HEIGHT: 58 IN | BODY MASS INDEX: 38.41 KG/M2

## 2022-08-31 DIAGNOSIS — G37.9 DEMYELINATING CHANGES IN BRAIN (HCC): Primary | ICD-10-CM

## 2022-08-31 DIAGNOSIS — G62.9 NEUROPATHY: ICD-10-CM

## 2022-08-31 PROCEDURE — 99214 OFFICE O/P EST MOD 30 MIN: CPT | Performed by: STUDENT IN AN ORGANIZED HEALTH CARE EDUCATION/TRAINING PROGRAM

## 2022-08-31 RX ORDER — PREDNISONE 20 MG/1
TABLET ORAL
Qty: 18 TABLET | Refills: 0 | Status: SHIPPED | OUTPATIENT
Start: 2022-08-31 | End: 2022-09-27 | Stop reason: ALTCHOICE

## 2022-08-31 RX ORDER — GABAPENTIN 100 MG/1
100 CAPSULE ORAL 3 TIMES DAILY
Qty: 90 CAPSULE | Refills: 5 | Status: SHIPPED | OUTPATIENT
Start: 2022-08-31 | End: 2023-02-27
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
## 2022-09-27 ENCOUNTER — OFFICE VISIT (OUTPATIENT)
Dept: PRIMARY CARE CLINIC | Age: 37
End: 2022-09-27
Payer: COMMERCIAL

## 2022-09-27 ENCOUNTER — HOSPITAL ENCOUNTER (OUTPATIENT)
Dept: GENERAL RADIOLOGY | Age: 37
Discharge: HOME | End: 2022-09-29
Payer: COMMERCIAL

## 2022-09-27 ENCOUNTER — HOSPITAL ENCOUNTER (OUTPATIENT)
Age: 37
Setting detail: SPECIMEN
Discharge: HOME | End: 2022-09-27

## 2022-09-27 ENCOUNTER — HOSPITAL ENCOUNTER (OUTPATIENT)
Age: 37
Discharge: HOME | End: 2022-09-29
Payer: COMMERCIAL

## 2022-09-27 VITALS
TEMPERATURE: 98.4 F | SYSTOLIC BLOOD PRESSURE: 106 MMHG | WEIGHT: 187 LBS | BODY MASS INDEX: 39.08 KG/M2 | HEART RATE: 123 BPM | OXYGEN SATURATION: 96 % | DIASTOLIC BLOOD PRESSURE: 80 MMHG

## 2022-09-27 DIAGNOSIS — R05.9 COUGH: Primary | ICD-10-CM

## 2022-09-27 DIAGNOSIS — R05.9 COUGH: ICD-10-CM

## 2022-09-27 DIAGNOSIS — M79.10 MYALGIA: ICD-10-CM

## 2022-09-27 DIAGNOSIS — G44.209 ACUTE NON INTRACTABLE TENSION-TYPE HEADACHE: ICD-10-CM

## 2022-09-27 PROCEDURE — 99213 OFFICE O/P EST LOW 20 MIN: CPT | Performed by: PHYSICIAN ASSISTANT

## 2022-09-27 PROCEDURE — G8427 DOCREV CUR MEDS BY ELIG CLIN: HCPCS | Performed by: PHYSICIAN ASSISTANT

## 2022-09-27 PROCEDURE — 1036F TOBACCO NON-USER: CPT | Performed by: PHYSICIAN ASSISTANT

## 2022-09-27 PROCEDURE — 71046 X-RAY EXAM CHEST 2 VIEWS: CPT

## 2022-09-27 PROCEDURE — G8417 CALC BMI ABV UP PARAM F/U: HCPCS | Performed by: PHYSICIAN ASSISTANT

## 2022-09-27 SDOH — ECONOMIC STABILITY: FOOD INSECURITY: WITHIN THE PAST 12 MONTHS, THE FOOD YOU BOUGHT JUST DIDN'T LAST AND YOU DIDN'T HAVE MONEY TO GET MORE.: NEVER TRUE

## 2022-09-27 SDOH — ECONOMIC STABILITY: FOOD INSECURITY: WITHIN THE PAST 12 MONTHS, YOU WORRIED THAT YOUR FOOD WOULD RUN OUT BEFORE YOU GOT MONEY TO BUY MORE.: NEVER TRUE

## 2022-09-27 ASSESSMENT — PATIENT HEALTH QUESTIONNAIRE - PHQ9
SUM OF ALL RESPONSES TO PHQ QUESTIONS 1-9: 0
6. FEELING BAD ABOUT YOURSELF - OR THAT YOU ARE A FAILURE OR HAVE LET YOURSELF OR YOUR FAMILY DOWN: 0
7. TROUBLE CONCENTRATING ON THINGS, SUCH AS READING THE NEWSPAPER OR WATCHING TELEVISION: 0
3. TROUBLE FALLING OR STAYING ASLEEP: 0
SUM OF ALL RESPONSES TO PHQ QUESTIONS 1-9: 0
SUM OF ALL RESPONSES TO PHQ QUESTIONS 1-9: 0
2. FEELING DOWN, DEPRESSED OR HOPELESS: 0
10. IF YOU CHECKED OFF ANY PROBLEMS, HOW DIFFICULT HAVE THESE PROBLEMS MADE IT FOR YOU TO DO YOUR WORK, TAKE CARE OF THINGS AT HOME, OR GET ALONG WITH OTHER PEOPLE: 0
5. POOR APPETITE OR OVEREATING: 0
9. THOUGHTS THAT YOU WOULD BE BETTER OFF DEAD, OR OF HURTING YOURSELF: 0
8. MOVING OR SPEAKING SO SLOWLY THAT OTHER PEOPLE COULD HAVE NOTICED. OR THE OPPOSITE, BEING SO FIGETY OR RESTLESS THAT YOU HAVE BEEN MOVING AROUND A LOT MORE THAN USUAL: 0
4. FEELING TIRED OR HAVING LITTLE ENERGY: 0
SUM OF ALL RESPONSES TO PHQ QUESTIONS 1-9: 0
1. LITTLE INTEREST OR PLEASURE IN DOING THINGS: 0
SUM OF ALL RESPONSES TO PHQ9 QUESTIONS 1 & 2: 0

## 2022-09-27 ASSESSMENT — SOCIAL DETERMINANTS OF HEALTH (SDOH): HOW HARD IS IT FOR YOU TO PAY FOR THE VERY BASICS LIKE FOOD, HOUSING, MEDICAL CARE, AND HEATING?: NOT HARD AT ALL

## 2022-09-27 ASSESSMENT — ENCOUNTER SYMPTOMS
RHINORRHEA: 0
CONSTIPATION: 0
COUGH: 1
SINUS PAIN: 0
SHORTNESS OF BREATH: 1
NAUSEA: 0
VOMITING: 0
SORE THROAT: 1
ABDOMINAL PAIN: 0
BACK PAIN: 0
DIARRHEA: 0

## 2022-09-27 NOTE — PROGRESS NOTES
[address] [facility] PRIMARY CARE  [address] [facility]  [address]  [address] [address]  Dept: [phone]  Dept Fax: [phone]    Efrain Zepeda is a 40 y.o. female who presents today for her medical conditions/complaints as noted below. Chief Complaint   Patient presents with    Cough     x4days    Generalized Body Aches     x3days    Headache     x5days       HPI:     Patient presents the office for evaluation of cold symptoms. She states that she noticed a dull headache starting last Friday. This is not uncommon for her and that she has chronic headache history. Other symptoms including cough, chills, body aches, postnasal drainage and sore throat started on Sunday. They have waxed and waned over the past couple days. She admits to shortness of breath with exertion. Denies any chest pain, leg edema or pain. Denies any abdominal concern. She is eating well without issue. No known sick exposures. She has not tested herself for COVID. She had COVID last year around this time and ended up with pneumonia. No other concerns or complaints. BP stable. Pulse slightly increased today. Denies any palpitations. Oxygen saturation stable.         Hemoglobin A1C (%)   Date Value   06/07/2022 5.8   01/18/2022 5.7   01/03/2020 5.3             ( goal A1C is < 7)   No results found for: LABMICR  LDL Cholesterol (mg/dL)   Date Value   01/18/2022 80   03/01/2017 116   07/14/2015 130       (goal LDL is <100)   AST (U/L)   Date Value   01/18/2022 12     ALT (U/L)   Date Value   01/18/2022 6     BUN (mg/dL)   Date Value   08/02/2022 9     BP Readings from Last 3 Encounters:   09/27/22 106/80   08/31/22 130/84   08/02/22 (!) 138/94          (goal 120/80)    Past Medical History:   Diagnosis Date    Abnormal brain MRI     Anxiety     Bicornuate uterus     Diverticulosis     GERD (gastroesophageal reflux disease)     Hx of migraine headaches     Miscarriage     6 total    Wears glasses Past Surgical History:   Procedure Laterality Date    BREAST REDUCTION SURGERY  10/2016    TUBAL LIGATION  10/2015       Family History   Problem Relation Age of Onset    Depression Mother     Anxiety Disorder Mother     Elevated Lipids Mother      Problems Mother     Kidney Disease Mother         CKD    Other Mother         GERD    Other Father         gallstones    Diabetes Maternal Grandmother        Social History     Tobacco Use    Smoking status: Never    Smokeless tobacco: Never   Substance Use Topics    Alcohol use: Yes     Alcohol/week: 0.0 standard drinks     Comment: very little      Current Outpatient Medications   Medication Sig Dispense Refill    gabapentin (NEURONTIN) 100 MG capsule Take 1 capsule by mouth 3 times daily for 180 days. Intended supply: 30 days 90 capsule 5    ondansetron (ZOFRAN ODT) 4 MG disintegrating tablet Take 1 tablet by mouth every 8 hours as needed for Nausea (Patient not taking: Reported on 8/31/2022) 12 tablet 0    tamsulosin (FLOMAX) 0.4 MG capsule Take 1 capsule by mouth in the morning for 5 doses.  (Patient not taking: Reported on 8/31/2022) 5 capsule 0    buPROPion (WELLBUTRIN XL) 150 MG extended release tablet Take 1 tablet by mouth every morning 90 tablet 0    amitriptyline (ELAVIL) 25 mg tablet Take 1 tablet by mouth nightly 90 tablet 1    topiramate (TOPAMAX) 50 mg tablet Take 1.5 tablets by mouth daily 45 tablet 11    rizatriptan (MAXALT) 10 MG tablet Take 1 tablet by mouth once as needed for Migraine May repeat in 2 hours if needed 10 tablet 3    pantoprazole (PROTONIX) 40 MG tablet Take 1 tablet by mouth daily 90 tablet 1    ibuprofen (ADVIL;MOTRIN) 800 MG tablet Take 1 tablet by mouth 2 times daily as needed for Pain 180 tablet 1    albuterol sulfate HFA (VENTOLIN HFA) 108 (90 Base) MCG/ACT inhaler Inhale 2 puffs into the lungs 4 times daily as needed for Wheezing (Patient not taking: No sig reported) 54 g 1    Spacer/Aero-Holding Chambers GRAHAM 1 Device by Does not apply route daily as needed (with inhaler) 1 each 0    Norethin Ace-Eth Estrad-FE 1-20 MG-MCG(24) CAPS Take 1 capsule by mouth daily 28 capsule      Current Facility-Administered Medications   Medication Dose Route Frequency Provider Last Rate Last Admin    lidocaine-EPINEPHrine 1 %-1:041945 injection 0.5 mL  0.5 mL IntraDERmal Once Radha Cancel, PA-C         No Known Allergies    Health Maintenance   Topic Date Due    COVID-19 Vaccine (1) Never done    Hepatitis C screen  Never done    Cervical cancer screen  07/22/2022    Flu vaccine (1) Never done    Depression Monitoring  06/03/2023    A1C test (Diabetic or Prediabetic)  06/07/2023    DTaP/Tdap/Td vaccine (2 - Td or Tdap) 07/09/2025    HIV screen  Completed    Hepatitis A vaccine  Aged Out    Hepatitis B vaccine  Aged Out    Hib vaccine  Aged Out    Meningococcal (ACWY) vaccine  Aged Out    Pneumococcal 0-64 years Vaccine  Aged Out    Varicella vaccine  Discontinued       Subjective:      Review of Systems   Constitutional:  Positive for chills and fatigue. Negative for fever. HENT:  Positive for postnasal drip and sore throat (mild). Negative for congestion, rhinorrhea and sinus pain. Respiratory:  Positive for cough and shortness of breath. Cardiovascular:  Negative for chest pain, palpitations and leg swelling. Gastrointestinal:  Negative for abdominal pain, constipation, diarrhea, nausea and vomiting. Genitourinary:  Negative for difficulty urinating, frequency and urgency. Musculoskeletal:  Negative for arthralgias, back pain and myalgias. Neurological:  Positive for headaches. Negative for dizziness. Psychiatric/Behavioral:  Negative for confusion, dysphoric mood and sleep disturbance. The patient is not nervous/anxious. All other systems reviewed and are negative. Objective:     Physical Exam  Constitutional:       General: She is not in acute distress. Appearance: Normal appearance. HENT:      Head: Normocephalic. Right Ear: External ear normal.      Left Ear: External ear normal.      Nose: No congestion. Mouth/Throat:      Mouth: Mucous membranes are moist.      Pharynx: No oropharyngeal exudate or posterior oropharyngeal erythema. Eyes:      Extraocular Movements: Extraocular movements intact. Conjunctiva/sclera: Conjunctivae normal.      Pupils: Pupils are equal, round, and reactive to light. Cardiovascular:      Rate and Rhythm: Regular rhythm. Tachycardia present. Pulses: Normal pulses. Heart sounds: Normal heart sounds. Pulmonary:      Effort: Pulmonary effort is normal. No respiratory distress. Breath sounds: Normal breath sounds. Abdominal:      General: Abdomen is flat. Bowel sounds are normal.      Palpations: Abdomen is soft. Tenderness: There is no abdominal tenderness. Musculoskeletal:      Cervical back: Normal range of motion. Right lower leg: No edema. Left lower leg: No edema. Lymphadenopathy:      Cervical: No cervical adenopathy. Skin:     General: Skin is warm. Capillary Refill: Capillary refill takes less than 2 seconds. Neurological:      General: No focal deficit present. Mental Status: She is alert and oriented to person, place, and time. Psychiatric:         Mood and Affect: Mood normal.         Behavior: Behavior normal.     /80   Pulse (!) 123   Temp 98.4 °F (36.9 °C)   Wt 187 lb (84.8 kg)   SpO2 96%   BMI 39.08 kg/m²     Assessment:       ICD-10-CM    1. Cough  R05.9 XR CHEST STANDARD (2 VW)     COVID-19      2. Acute non intractable tension-type headache  G44.209       3. Myalgia  M79.10                Plan:      1-3. Patient with multiple symptoms of acute virus. Plan for COVID-19 test.  Patient also given order for chest x-ray. Pending results will update plan. We discussed possible Z-Charlie and prednisone. I advised on inhaler as needed which she already has.   We discussed symptomatic care including over-the-counter cough/cold medication, rest, hydration, bland diet. I advised on quarantine until we obtain test results. We discussed that if any worsening symptoms or respiratory distress she should be evaluated the ED. Return if symptoms worsen or fail to improve. Orders Placed This Encounter   Procedures    XR CHEST STANDARD (2 VW)     Standing Status:   Future     Standing Expiration Date:   9/27/2023     Order Specific Question:   Reason for exam:     Answer:   acute viral symptoms with SOB    COVID-19     Standing Status:   Future     Standing Expiration Date:   9/27/2023     Scheduling Instructions:      1) Due to current limited availability of the COVID-19 test, tests will be prioritized based on responses to questions above. Testing may be delayed due to volume. 2) Print and instruct patient to adhere to CDC home isolation program. (Link Above)              3) Set up or refer patient for a monitoring program.              4) Have patient sign up for and leverage MyChart (if not previously done). Order Specific Question:   Is this test for diagnosis or screening? Answer:   Diagnosis of ill patient     Order Specific Question:   Symptomatic for COVID-19 as defined by CDC? Answer:   Yes     Order Specific Question:   Date of Symptom Onset     Answer:   9/25/2022     Order Specific Question:   Hospitalized for COVID-19? Answer:   No     Order Specific Question:   Admitted to ICU for COVID-19? Answer:   No     Order Specific Question:   Employed in healthcare setting? Answer:   No     Order Specific Question:   Resident in a congregate (group) care setting? Answer:   No     Order Specific Question:   Pregnant? Answer:   No     Order Specific Question:   Previously tested for COVID-19? Answer:   Yes         Patient given educational materials - see patient instructions. Discussed use, benefit, and side effects of prescribedmedications.   All patient questions answered. Pt voiced understanding. Reviewed health maintenance. Instructed to continue current medications, diet and exercise. Patient agreed with treatment plan. Follow up as directed.         Electronically signed by Raymon Peck PA-C on 9/27/2022 at 12:53 PM

## 2022-09-28 LAB
SARS-COV-2: ABNORMAL
SARS-COV-2: DETECTED
SOURCE: ABNORMAL

## 2022-10-04 ENCOUNTER — OFFICE VISIT (OUTPATIENT)
Dept: PRIMARY CARE CLINIC | Age: 37
End: 2022-10-04
Payer: COMMERCIAL

## 2022-10-04 VITALS
BODY MASS INDEX: 40.05 KG/M2 | HEART RATE: 89 BPM | RESPIRATION RATE: 16 BRPM | HEIGHT: 58 IN | DIASTOLIC BLOOD PRESSURE: 78 MMHG | SYSTOLIC BLOOD PRESSURE: 120 MMHG | WEIGHT: 190.8 LBS | OXYGEN SATURATION: 98 %

## 2022-10-04 DIAGNOSIS — F33.1 MODERATE EPISODE OF RECURRENT MAJOR DEPRESSIVE DISORDER (HCC): Primary | ICD-10-CM

## 2022-10-04 DIAGNOSIS — E66.01 CLASS 2 SEVERE OBESITY DUE TO EXCESS CALORIES WITH SERIOUS COMORBIDITY AND BODY MASS INDEX (BMI) OF 39.0 TO 39.9 IN ADULT (HCC): ICD-10-CM

## 2022-10-04 DIAGNOSIS — G43.109 MIGRAINE WITH AURA AND WITHOUT STATUS MIGRAINOSUS, NOT INTRACTABLE: ICD-10-CM

## 2022-10-04 DIAGNOSIS — K21.9 GASTROESOPHAGEAL REFLUX DISEASE WITHOUT ESOPHAGITIS: Chronic | ICD-10-CM

## 2022-10-04 PROCEDURE — 1036F TOBACCO NON-USER: CPT | Performed by: PHYSICIAN ASSISTANT

## 2022-10-04 PROCEDURE — G8427 DOCREV CUR MEDS BY ELIG CLIN: HCPCS | Performed by: PHYSICIAN ASSISTANT

## 2022-10-04 PROCEDURE — G8417 CALC BMI ABV UP PARAM F/U: HCPCS | Performed by: PHYSICIAN ASSISTANT

## 2022-10-04 PROCEDURE — G8484 FLU IMMUNIZE NO ADMIN: HCPCS | Performed by: PHYSICIAN ASSISTANT

## 2022-10-04 PROCEDURE — 99214 OFFICE O/P EST MOD 30 MIN: CPT | Performed by: PHYSICIAN ASSISTANT

## 2022-10-04 RX ORDER — BUPROPION HYDROCHLORIDE 150 MG/1
150 TABLET ORAL EVERY MORNING
Qty: 90 TABLET | Refills: 0 | Status: SHIPPED | OUTPATIENT
Start: 2022-10-04

## 2022-10-04 ASSESSMENT — PATIENT HEALTH QUESTIONNAIRE - PHQ9
10. IF YOU CHECKED OFF ANY PROBLEMS, HOW DIFFICULT HAVE THESE PROBLEMS MADE IT FOR YOU TO DO YOUR WORK, TAKE CARE OF THINGS AT HOME, OR GET ALONG WITH OTHER PEOPLE: 1
7. TROUBLE CONCENTRATING ON THINGS, SUCH AS READING THE NEWSPAPER OR WATCHING TELEVISION: 0
SUM OF ALL RESPONSES TO PHQ9 QUESTIONS 1 & 2: 0
3. TROUBLE FALLING OR STAYING ASLEEP: 3
2. FEELING DOWN, DEPRESSED OR HOPELESS: 0
SUM OF ALL RESPONSES TO PHQ QUESTIONS 1-9: 6
4. FEELING TIRED OR HAVING LITTLE ENERGY: 3
9. THOUGHTS THAT YOU WOULD BE BETTER OFF DEAD, OR OF HURTING YOURSELF: 0
SUM OF ALL RESPONSES TO PHQ QUESTIONS 1-9: 6
6. FEELING BAD ABOUT YOURSELF - OR THAT YOU ARE A FAILURE OR HAVE LET YOURSELF OR YOUR FAMILY DOWN: 0
5. POOR APPETITE OR OVEREATING: 0
8. MOVING OR SPEAKING SO SLOWLY THAT OTHER PEOPLE COULD HAVE NOTICED. OR THE OPPOSITE, BEING SO FIGETY OR RESTLESS THAT YOU HAVE BEEN MOVING AROUND A LOT MORE THAN USUAL: 0
1. LITTLE INTEREST OR PLEASURE IN DOING THINGS: 0

## 2022-10-04 ASSESSMENT — ENCOUNTER SYMPTOMS
RHINORRHEA: 0
ABDOMINAL PAIN: 0
VOMITING: 0
SHORTNESS OF BREATH: 0
NAUSEA: 0
CONSTIPATION: 0
COUGH: 0
DIARRHEA: 0
BACK PAIN: 0
SINUS PAIN: 0

## 2022-10-04 NOTE — PROGRESS NOTES
061 [facility] PRIMARY CARE  [address] [facility]  [address]  [address] [address]  Dept: [phone]  Dept Fax: [phone]    Ronald Jansen is a 40 y.o. female who presents today for her medical conditions/complaints as noted below. Chief Complaint   Patient presents with    Medication Check     4 month follow up        HPI:     Patient presents to the office for routine follow-up. She has past medical history including GERD, migraines, depression, obesity, MTHFR, neuropathy. Patient is following closely with neurology. There is questionable history of multiple sclerosis. She is going to have EMG next month. She is currently on amitriptyline and gabapentin as per neurologist.  She is also taking vitamin B12. Patient reports that over the last 2 months she has noticed weight gain. She is worried it is related to medications. We had a long discussion about her current dietary habits. She does admit to multiple sources of concentrated carbohydrates/starches. Otherwise, patient denies any new or acute concerns. She reports she is doing well since her COVID last week. Denies any subsequent symptoms. Breathing is returned to normal.  No fever or chills. Patient reports she is doing well on Wellbutrin. She states her depression is well controlled. Her mood is stable and she has more positive days. BP stable. Weight slightly increased past few office visits.       Hemoglobin A1C (%)   Date Value   06/07/2022 5.8   01/18/2022 5.7   01/03/2020 5.3             ( goal A1C is < 7)   No results found for: LABMICR  LDL Cholesterol (mg/dL)   Date Value   01/18/2022 80   03/01/2017 116   07/14/2015 130       (goal LDL is <100)   AST (U/L)   Date Value   01/18/2022 12     ALT (U/L)   Date Value   01/18/2022 6     BUN (mg/dL)   Date Value   08/02/2022 9     BP Readings from Last 3 Encounters:   10/04/22 120/78   09/27/22 106/80   08/31/22 130/84          (goal 120/80)    Past Medical History:   Diagnosis Date    Abnormal brain MRI     Anxiety     Bicornuate uterus     Diverticulosis     GERD (gastroesophageal reflux disease)     Hx of migraine headaches     Miscarriage     6 total    Wears glasses       Past Surgical History:   Procedure Laterality Date    BREAST REDUCTION SURGERY  10/2016    TUBAL LIGATION  10/2015       Family History   Problem Relation Age of Onset    Depression Mother     Anxiety Disorder Mother     Elevated Lipids Mother      Problems Mother     Kidney Disease Mother         CKD    Other Mother         GERD    Other Father         gallstones    Diabetes Maternal Grandmother        Social History     Tobacco Use    Smoking status: Never    Smokeless tobacco: Never   Substance Use Topics    Alcohol use: Yes     Alcohol/week: 0.0 standard drinks     Comment: very little      Current Outpatient Medications   Medication Sig Dispense Refill    buPROPion (WELLBUTRIN XL) 150 MG extended release tablet Take 1 tablet by mouth every morning 90 tablet 0    gabapentin (NEURONTIN) 100 MG capsule Take 1 capsule by mouth 3 times daily for 180 days.  Intended supply: 30 days 90 capsule 5    ondansetron (ZOFRAN ODT) 4 MG disintegrating tablet Take 1 tablet by mouth every 8 hours as needed for Nausea 12 tablet 0    amitriptyline (ELAVIL) 25 mg tablet Take 1 tablet by mouth nightly 90 tablet 1    pantoprazole (PROTONIX) 40 MG tablet Take 1 tablet by mouth daily 90 tablet 1    ibuprofen (ADVIL;MOTRIN) 800 MG tablet Take 1 tablet by mouth 2 times daily as needed for Pain 180 tablet 1    albuterol sulfate HFA (VENTOLIN HFA) 108 (90 Base) MCG/ACT inhaler Inhale 2 puffs into the lungs 4 times daily as needed for Wheezing 54 g 1    Spacer/Aero-Holding Chambers GRAHAM 1 Device by Does not apply route daily as needed (with inhaler) 1 each 0    Norethin Ace-Eth Estrad-FE 1-20 MG-MCG(24) CAPS Take 1 capsule by mouth daily 28 capsule     rizatriptan (MAXALT) 10 MG tablet Take 1 tablet by mouth once as needed for Migraine May repeat in 2 hours if needed 10 tablet 3     Current Facility-Administered Medications   Medication Dose Route Frequency Provider Last Rate Last Admin    lidocaine-EPINEPHrine 1 %-1:006174 injection 0.5 mL  0.5 mL IntraDERmal Once Marv Weston PA-C         No Known Allergies    Health Maintenance   Topic Date Due    COVID-19 Vaccine (1) Never done    Hepatitis C screen  Never done    Cervical cancer screen  07/22/2022    Flu vaccine (1) Never done    A1C test (Diabetic or Prediabetic)  06/07/2023    Depression Monitoring  10/04/2023    DTaP/Tdap/Td vaccine (2 - Td or Tdap) 07/09/2025    HIV screen  Completed    Hepatitis A vaccine  Aged Out    Hepatitis B vaccine  Aged Out    Hib vaccine  Aged Out    Meningococcal (ACWY) vaccine  Aged Out    Pneumococcal 0-64 years Vaccine  Aged Out    Varicella vaccine  Discontinued       Subjective:      Review of Systems   Constitutional:  Negative for chills, fatigue and fever. HENT:  Negative for congestion, rhinorrhea and sinus pain. Respiratory:  Negative for cough and shortness of breath. Cardiovascular:  Negative for chest pain and leg swelling. Gastrointestinal:  Negative for abdominal pain, constipation, diarrhea, nausea and vomiting.        + GERD   Genitourinary:  Negative for difficulty urinating, frequency and urgency. Musculoskeletal:  Negative for arthralgias, back pain and myalgias. Neurological:  Negative for dizziness and headaches. Psychiatric/Behavioral:  Negative for confusion, dysphoric mood and sleep disturbance. The patient is not nervous/anxious. All other systems reviewed and are negative. Objective:     Physical Exam  Vitals and nursing note reviewed. Constitutional:       General: She is not in acute distress. Appearance: Normal appearance. She is obese. HENT:      Head: Normocephalic.       Mouth/Throat:      Mouth: Mucous membranes are moist.   Eyes:      Extraocular Movements: Extraocular movements intact. Conjunctiva/sclera: Conjunctivae normal.      Pupils: Pupils are equal, round, and reactive to light. Cardiovascular:      Rate and Rhythm: Normal rate and regular rhythm. Pulses: Normal pulses. Heart sounds: Normal heart sounds. No murmur heard. Pulmonary:      Effort: Pulmonary effort is normal. No respiratory distress. Breath sounds: Normal breath sounds. Musculoskeletal:      Cervical back: Normal range of motion. Right lower leg: No edema. Left lower leg: No edema. Lymphadenopathy:      Cervical: No cervical adenopathy. Skin:     General: Skin is warm. Capillary Refill: Capillary refill takes less than 2 seconds. Neurological:      General: No focal deficit present. Mental Status: She is alert and oriented to person, place, and time. Psychiatric:         Mood and Affect: Mood normal.         Behavior: Behavior normal.     /78 (Site: Left Upper Arm, Position: Sitting, Cuff Size: Medium Adult)   Pulse 89   Resp 16   Ht 4' 10\" (1.473 m)   Wt 190 lb 12.8 oz (86.5 kg)   SpO2 98%   BMI 39.88 kg/m²     Assessment:       ICD-10-CM    1. Moderate episode of recurrent major depressive disorder (HCC)  F33.1 buPROPion (WELLBUTRIN XL) 150 MG extended release tablet      2. Class 2 severe obesity due to excess calories with serious comorbidity and body mass index (BMI) of 39.0 to 39.9 in adult [facility])  E66.01 Melissa    Z68.39       3. Gastroesophageal reflux disease without esophagitis  K21.9       4. Migraine with aura and without status migrainosus, not intractable  G43. 109                Plan:      1. Depression is stable. Patient to continue Wellbutrin 150 mg once daily. I also encouraged following with counseling/therapy. 2.  I had a long discussion with patient about weight management. I strongly advised making nutrition changes. Her diet does seem to consist of predominantly carbohydrates. Patient be given referral to dietitian. 3.  Patient to continue pantoprazole 40 mg daily. We also discussed importance of dietary changes to reduce reflux/heartburn symptoms. 4.  Patient to continue following with neurology for management of migraine and neuropathy. Return in about 3 months (around 1/4/2023) for medication recheck. Orders Placed This Encounter   Procedures    Melissa     Referral Priority:   Routine     Referral Type:   Eval and Treat     Referral Reason:   Specialty Services Required     Requested Specialty:   Nutrition     Number of Visits Requested:   1         Patient given educational materials - see patient instructions. Discussed use, benefit, and side effects of prescribed medications. All patient questions answered. Pt voiced understanding. Reviewed health maintenance. Instructed to continue current medications, diet and exercise. Patient agreed with treatment plan. Follow up as directed.         Electronically signed by Boo Vila PA-C on 10/4/2022 at 9:45 AM

## 2022-11-01 ENCOUNTER — HOSPITAL ENCOUNTER (OUTPATIENT)
Age: 37
Setting detail: SPECIMEN
Discharge: HOME | End: 2022-11-01

## 2022-11-01 DIAGNOSIS — G37.9 DEMYELINATING CHANGES IN BRAIN (HCC): ICD-10-CM

## 2022-11-01 DIAGNOSIS — G62.9 NEUROPATHY: ICD-10-CM

## 2022-11-02 LAB
ALBUMIN (CALCULATED): 4.2 G/DL (ref 3.2–5.2)
ALBUMIN PERCENT: 65 % (ref 45–65)
ALPHA 1 PERCENT: 3 % (ref 3–6)
ALPHA 2 PERCENT: 12 % (ref 6–13)
ALPHA-1-GLOBULIN: 0.2 G/DL (ref 0.1–0.4)
ALPHA-2-GLOBULIN: 0.8 G/DL (ref 0.5–0.9)
BETA GLOBULIN: 0.8 G/DL (ref 0.5–1.1)
BETA PERCENT: 13 % (ref 11–19)
GAMMA GLOBULIN %: 8 % (ref 9–20)
GAMMA GLOBULIN: 0.5 G/DL (ref 0.5–1.5)
PATHOLOGIST: ABNORMAL
PROTEIN ELECTROPHORESIS, SERUM: ABNORMAL
REASON FOR REJECTION: NORMAL
TOTAL PROT. SUM,%: 101 % (ref 98–102)
TOTAL PROT. SUM: 6.5 G/DL (ref 6.3–8.2)
TOTAL PROTEIN: 6.5 G/DL (ref 6.4–8.3)
ZZ NTE CLEAN UP: ORDERED TEST: NORMAL
ZZ NTE WITH NAME CLEAN UP: SPECIMEN SOURCE: NORMAL

## 2022-11-05 LAB — VITAMIN B6: 53.7 NMOL/L (ref 20–125)

## 2022-11-07 ENCOUNTER — HOSPITAL ENCOUNTER (OUTPATIENT)
Dept: NEUROLOGY | Age: 37
Discharge: HOME | End: 2022-11-07
Payer: COMMERCIAL

## 2022-11-07 PROCEDURE — 95909 NRV CNDJ TST 5-6 STUDIES: CPT | Performed by: PHYSICAL MEDICINE & REHABILITATION

## 2022-11-07 PROCEDURE — 95886 MUSC TEST DONE W/N TEST COMP: CPT | Performed by: PHYSICAL MEDICINE & REHABILITATION

## 2022-11-08 ENCOUNTER — HOSPITAL ENCOUNTER (OUTPATIENT)
Age: 37
Setting detail: SPECIMEN
Discharge: HOME | End: 2022-11-08

## 2022-11-09 ENCOUNTER — HOSPITAL ENCOUNTER (OUTPATIENT)
Dept: NEUROLOGY | Age: 37
Discharge: HOME | End: 2022-11-09
Payer: COMMERCIAL

## 2022-11-09 PROCEDURE — 95886 MUSC TEST DONE W/N TEST COMP: CPT | Performed by: PHYSICAL MEDICINE & REHABILITATION

## 2022-11-09 PROCEDURE — 95910 NRV CNDJ TEST 7-8 STUDIES: CPT | Performed by: PHYSICAL MEDICINE & REHABILITATION

## 2022-11-14 LAB
SEND OUT REPORT: NORMAL
TEST NAME: NORMAL

## 2022-12-07 ENCOUNTER — OFFICE VISIT (OUTPATIENT)
Dept: NEUROLOGY | Age: 37
End: 2022-12-07
Payer: COMMERCIAL

## 2022-12-07 VITALS
HEIGHT: 58 IN | BODY MASS INDEX: 39.47 KG/M2 | DIASTOLIC BLOOD PRESSURE: 85 MMHG | HEART RATE: 94 BPM | WEIGHT: 188 LBS | SYSTOLIC BLOOD PRESSURE: 135 MMHG

## 2022-12-07 DIAGNOSIS — G43.109 MIGRAINE WITH AURA AND WITHOUT STATUS MIGRAINOSUS, NOT INTRACTABLE: Primary | ICD-10-CM

## 2022-12-07 DIAGNOSIS — G62.9 NEUROPATHY: ICD-10-CM

## 2022-12-07 PROCEDURE — G8427 DOCREV CUR MEDS BY ELIG CLIN: HCPCS | Performed by: STUDENT IN AN ORGANIZED HEALTH CARE EDUCATION/TRAINING PROGRAM

## 2022-12-07 PROCEDURE — 1036F TOBACCO NON-USER: CPT | Performed by: STUDENT IN AN ORGANIZED HEALTH CARE EDUCATION/TRAINING PROGRAM

## 2022-12-07 PROCEDURE — 99214 OFFICE O/P EST MOD 30 MIN: CPT | Performed by: STUDENT IN AN ORGANIZED HEALTH CARE EDUCATION/TRAINING PROGRAM

## 2022-12-07 PROCEDURE — G8484 FLU IMMUNIZE NO ADMIN: HCPCS | Performed by: STUDENT IN AN ORGANIZED HEALTH CARE EDUCATION/TRAINING PROGRAM

## 2022-12-07 PROCEDURE — G8417 CALC BMI ABV UP PARAM F/U: HCPCS | Performed by: STUDENT IN AN ORGANIZED HEALTH CARE EDUCATION/TRAINING PROGRAM

## 2022-12-07 RX ORDER — AMITRIPTYLINE HYDROCHLORIDE 25 MG/1
37.5 TABLET, FILM COATED ORAL NIGHTLY
Qty: 90 TABLET | Refills: 1 | Status: SHIPPED | OUTPATIENT
Start: 2022-12-07

## 2022-12-07 RX ORDER — ZOLMITRIPTAN 5 MG/1
5 TABLET, FILM COATED ORAL PRN
Qty: 10 TABLET | Refills: 3 | Status: SHIPPED | OUTPATIENT
Start: 2022-12-07

## 2022-12-07 RX ORDER — PHENTERMINE HYDROCHLORIDE 37.5 MG/1
37.5 TABLET ORAL
COMMUNITY
Start: 2022-11-29
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
## 2022-12-27 DIAGNOSIS — K21.9 GASTROESOPHAGEAL REFLUX DISEASE WITHOUT ESOPHAGITIS: Chronic | ICD-10-CM

## 2022-12-28 RX ORDER — PANTOPRAZOLE SODIUM 40 MG/1
40 TABLET, DELAYED RELEASE ORAL DAILY
Qty: 90 TABLET | Refills: 1 | Status: SHIPPED | OUTPATIENT
Start: 2022-12-28 | End: 2023-12-28

## 2023-01-10 ENCOUNTER — OFFICE VISIT (OUTPATIENT)
Dept: PRIMARY CARE CLINIC | Age: 38
End: 2023-01-10
Payer: COMMERCIAL

## 2023-01-10 VITALS
HEIGHT: 58 IN | DIASTOLIC BLOOD PRESSURE: 72 MMHG | SYSTOLIC BLOOD PRESSURE: 118 MMHG | HEART RATE: 92 BPM | OXYGEN SATURATION: 97 % | BODY MASS INDEX: 39.34 KG/M2 | WEIGHT: 187.4 LBS | RESPIRATION RATE: 16 BRPM

## 2023-01-10 DIAGNOSIS — E72.11 MTHFR (METHYLENE THF REDUCTASE) DEFICIENCY AND HOMOCYSTINURIA (HCC): ICD-10-CM

## 2023-01-10 DIAGNOSIS — E72.12 MTHFR (METHYLENE THF REDUCTASE) DEFICIENCY AND HOMOCYSTINURIA (HCC): ICD-10-CM

## 2023-01-10 DIAGNOSIS — G43.109 MIGRAINE WITH AURA AND WITHOUT STATUS MIGRAINOSUS, NOT INTRACTABLE: ICD-10-CM

## 2023-01-10 DIAGNOSIS — E66.9 CLASS 2 OBESITY WITHOUT SERIOUS COMORBIDITY WITH BODY MASS INDEX (BMI) OF 39.0 TO 39.9 IN ADULT, UNSPECIFIED OBESITY TYPE: ICD-10-CM

## 2023-01-10 DIAGNOSIS — F33.1 MODERATE EPISODE OF RECURRENT MAJOR DEPRESSIVE DISORDER (HCC): Primary | ICD-10-CM

## 2023-01-10 PROCEDURE — G8417 CALC BMI ABV UP PARAM F/U: HCPCS | Performed by: PHYSICIAN ASSISTANT

## 2023-01-10 PROCEDURE — 99214 OFFICE O/P EST MOD 30 MIN: CPT | Performed by: PHYSICIAN ASSISTANT

## 2023-01-10 PROCEDURE — 1036F TOBACCO NON-USER: CPT | Performed by: PHYSICIAN ASSISTANT

## 2023-01-10 PROCEDURE — G8427 DOCREV CUR MEDS BY ELIG CLIN: HCPCS | Performed by: PHYSICIAN ASSISTANT

## 2023-01-10 PROCEDURE — G8484 FLU IMMUNIZE NO ADMIN: HCPCS | Performed by: PHYSICIAN ASSISTANT

## 2023-01-10 ASSESSMENT — ENCOUNTER SYMPTOMS
VOMITING: 0
ABDOMINAL PAIN: 0
CONSTIPATION: 0
COUGH: 0
DIARRHEA: 0
BACK PAIN: 0
SINUS PAIN: 0
NAUSEA: 0
SHORTNESS OF BREATH: 0
RHINORRHEA: 0

## 2023-01-10 ASSESSMENT — PATIENT HEALTH QUESTIONNAIRE - PHQ9
SUM OF ALL RESPONSES TO PHQ QUESTIONS 1-9: 3
9. THOUGHTS THAT YOU WOULD BE BETTER OFF DEAD, OR OF HURTING YOURSELF: 0
5. POOR APPETITE OR OVEREATING: 0
3. TROUBLE FALLING OR STAYING ASLEEP: 0
SUM OF ALL RESPONSES TO PHQ9 QUESTIONS 1 & 2: 0
SUM OF ALL RESPONSES TO PHQ QUESTIONS 1-9: 3
10. IF YOU CHECKED OFF ANY PROBLEMS, HOW DIFFICULT HAVE THESE PROBLEMS MADE IT FOR YOU TO DO YOUR WORK, TAKE CARE OF THINGS AT HOME, OR GET ALONG WITH OTHER PEOPLE: 1
4. FEELING TIRED OR HAVING LITTLE ENERGY: 3
SUM OF ALL RESPONSES TO PHQ QUESTIONS 1-9: 3
6. FEELING BAD ABOUT YOURSELF - OR THAT YOU ARE A FAILURE OR HAVE LET YOURSELF OR YOUR FAMILY DOWN: 0
2. FEELING DOWN, DEPRESSED OR HOPELESS: 0
7. TROUBLE CONCENTRATING ON THINGS, SUCH AS READING THE NEWSPAPER OR WATCHING TELEVISION: 0
1. LITTLE INTEREST OR PLEASURE IN DOING THINGS: 0
SUM OF ALL RESPONSES TO PHQ QUESTIONS 1-9: 3
8. MOVING OR SPEAKING SO SLOWLY THAT OTHER PEOPLE COULD HAVE NOTICED. OR THE OPPOSITE, BEING SO FIGETY OR RESTLESS THAT YOU HAVE BEEN MOVING AROUND A LOT MORE THAN USUAL: 0

## 2023-01-10 NOTE — PROGRESS NOTES
[facility] PR[facility]  [facility]  [address]  [address] [address]  Dept: [phone]  Dept Fax: [phone]    Irvin Aguirre is a 40 y.o. female who presents today for her medical conditions/complaints as noted below. Chief Complaint   Patient presents with    Depression       HPI:     Patient presents to the office for routine follow-up. She has past medical history including GERD, migraine, depression, obesity, MTHFR deficiency. Patient is working closely with gynecology on weight loss. She is following Noom plan and on Adipex. She is down a few pounds since last office visit. Gynecology is worried about hormonal changes. Otherwise, patient reports she is doing well. Denies any new or acute symptoms. No lightheadedness, dizziness, shortness of breath, chest pain, leg edema, syncope. Depression is stable. No side effect Wellbutrin. Migraines are well controlled since neurology bumped amitriptyline to 37.5 mg nightly. She is finding success with Zomig as needed for migraine. She is taking B12 daily. No other concerns or complaints. BP stable.       Hemoglobin A1C (%)   Date Value   06/07/2022 5.8   01/18/2022 5.7   01/03/2020 5.3             ( goal A1C is < 7)   No results found for: LABMICR  LDL Cholesterol (mg/dL)   Date Value   01/18/2022 80   03/01/2017 116   07/14/2015 130       (goal LDL is <100)   AST (U/L)   Date Value   01/18/2022 12     ALT (U/L)   Date Value   01/18/2022 6     BUN (mg/dL)   Date Value   08/02/2022 9     BP Readings from Last 3 Encounters:   01/10/23 118/72   12/07/22 135/85   10/04/22 120/78          (goal 120/80)    Past Medical History:   Diagnosis Date    Abnormal brain MRI     Anxiety     Bicornuate uterus     Diverticulosis     GERD (gastroesophageal reflux disease)     Headache     Hx of migraine headaches     Migraine     I have had them since i was young    Miscarriage     6 total    Wears glasses       Past Surgical History:   Procedure Laterality Date    BREAST REDUCTION SURGERY  10/2016    TUBAL LIGATION  10/2015       Family History   Problem Relation Age of Onset    Depression Mother     Anxiety Disorder Mother     Elevated Lipids Mother      Problems Mother     Kidney Disease Mother         CKD    Other Mother         GERD    Migraines Mother     Other Father         gallstones    Migraines Father     Diabetes Maternal Grandmother     Migraines Brother        Social History     Tobacco Use    Smoking status: Never    Smokeless tobacco: Never   Substance Use Topics    Alcohol use: Not Currently     Comment: Socially but only a few even then      Current Outpatient Medications   Medication Sig Dispense Refill    pantoprazole (PROTONIX) 40 MG tablet Take 1 tablet by mouth daily 90 tablet 1    phentermine (ADIPEX-P) 37.5 MG tablet Take 37.5 mg by mouth every morning (before breakfast).       ZOLMitriptan (ZOMIG) 5 MG tablet Take 1 tablet by mouth as needed for Migraine 10 tablet 3    amitriptyline (ELAVIL) 25 MG tablet Take 1.5 tablets by mouth nightly 90 tablet 1    buPROPion (WELLBUTRIN XL) 150 MG extended release tablet Take 1 tablet by mouth every morning 90 tablet 0    ondansetron (ZOFRAN ODT) 4 MG disintegrating tablet Take 1 tablet by mouth every 8 hours as needed for Nausea 12 tablet 0    ibuprofen (ADVIL;MOTRIN) 800 MG tablet Take 1 tablet by mouth 2 times daily as needed for Pain 180 tablet 1    albuterol sulfate HFA (VENTOLIN HFA) 108 (90 Base) MCG/ACT inhaler Inhale 2 puffs into the lungs 4 times daily as needed for Wheezing 54 g 1    Spacer/Aero-Holding Chambers GRAHAM 1 Device by Does not apply route daily as needed (with inhaler) 1 each 0    Norethin Ace-Eth Estrad-FE 1-20 MG-MCG(24) CAPS Take 1 capsule by mouth daily 28 capsule      Current Facility-Administered Medications   Medication Dose Route Frequency Provider Last Rate Last Admin    lidocaine-EPINEPHrine 1 %-1:776282 injection 0.5 mL  0.5 mL IntraDERmal Once Fara Ramirez PA-C         No Known Allergies    Health Maintenance   Topic Date Due    COVID-19 Vaccine (1) Never done    Hepatitis C screen  Never done    Cervical cancer screen  07/22/2022    Flu vaccine (1) 01/10/2024 (Originally 8/1/2022)    A1C test (Diabetic or Prediabetic)  06/07/2023    Depression Monitoring  01/10/2024    DTaP/Tdap/Td vaccine (2 - Td or Tdap) 07/09/2025    HIV screen  Completed    Hepatitis A vaccine  Aged Out    Hib vaccine  Aged Out    Meningococcal (ACWY) vaccine  Aged Out    Pneumococcal 0-64 years Vaccine  Aged Out    Varicella vaccine  Discontinued       Subjective:      Review of Systems   Constitutional:  Negative for chills, fatigue and fever. HENT:  Negative for congestion, rhinorrhea and sinus pain. Respiratory:  Negative for cough and shortness of breath. Cardiovascular:  Negative for chest pain and leg swelling. Gastrointestinal:  Negative for abdominal pain, constipation, diarrhea, nausea and vomiting. Genitourinary:  Negative for difficulty urinating, frequency and urgency. Musculoskeletal:  Negative for arthralgias, back pain and myalgias. Neurological:  Positive for headaches. Negative for dizziness. Psychiatric/Behavioral:  Negative for confusion, dysphoric mood and sleep disturbance. The patient is not nervous/anxious. All other systems reviewed and are negative. Objective:     Physical Exam  Vitals and nursing note reviewed. Constitutional:       General: She is not in acute distress. Appearance: Normal appearance. She is obese. HENT:      Head: Normocephalic. Mouth/Throat:      Mouth: Mucous membranes are moist.   Eyes:      Extraocular Movements: Extraocular movements intact. Conjunctiva/sclera: Conjunctivae normal.      Pupils: Pupils are equal, round, and reactive to light. Cardiovascular:      Rate and Rhythm: Normal rate and regular rhythm. Pulses: Normal pulses.       Heart sounds: Normal heart sounds. Pulmonary:      Effort: Pulmonary effort is normal.      Breath sounds: Normal breath sounds. Abdominal:      General: Abdomen is flat. Musculoskeletal:      Cervical back: Normal range of motion. Right lower leg: No edema. Left lower leg: No edema. Lymphadenopathy:      Cervical: No cervical adenopathy. Skin:     General: Skin is warm. Capillary Refill: Capillary refill takes less than 2 seconds. Neurological:      General: No focal deficit present. Mental Status: She is alert and oriented to person, place, and time. Psychiatric:         Mood and Affect: Mood normal.         Behavior: Behavior normal.     /72 (Site: Left Upper Arm, Position: Sitting, Cuff Size: Medium Adult)   Pulse 92   Resp 16   Ht 4' 10\" (1.473 m)   Wt 187 lb 6.4 oz (85 kg)   SpO2 97%   BMI 39.17 kg/m²     Assessment:       ICD-10-CM    1. Moderate episode of recurrent major depressive disorder (HCC)  F33.1       2. MTHFR (methylene THF reductase) deficiency and homocystinuria ([facility]  E72.12     E72.11       3. Class 2 obesity without serious comorbidity with body mass index (BMI) of 39.0 to 39.9 in adult, unspecified obesity type  E66.9     Z68.39       4. Migraine with aura and without status migrainosus, not intractable  G43. 109                Plan:      1. Depression is stable. Plan to continue Wellbutrin 150 mg once daily. 2.  Patient with history of MTHFR deficiency. No changes. Continue with hematology as needed. 3.  Patient with history of chronic obesity. She is currently being worked up with gynecology and treated with phentermine. I did discuss possibly starting Wegovy if covered by insurance, which she will contact. We discussed tips including reducing concentrated carbohydrates and late night snacking. 4.  Migraines are stable. Continue amitriptyline nightly and following with neurology.     Return in about 3 months (around 4/10/2023) for physical.    No orders of the defined types were placed in this encounter. Patient given educational materials - see patient instructions. Discussed use, benefit, and side effects of prescribedmedications. All patient questions answered. Pt voiced understanding. Reviewed health maintenance. Instructed to continue current medications, diet and exercise. Patient agreed with treatment plan. Follow up as directed.         Electronically signed by Emerita Carpenter PA-C on 1/10/2023 at 9:49 AM

## 2023-01-16 DIAGNOSIS — F33.1 MODERATE EPISODE OF RECURRENT MAJOR DEPRESSIVE DISORDER (HCC): ICD-10-CM

## 2023-01-16 RX ORDER — BUPROPION HYDROCHLORIDE 150 MG/1
150 TABLET ORAL EVERY MORNING
Qty: 90 TABLET | Refills: 0 | Status: SHIPPED | OUTPATIENT
Start: 2023-01-16

## 2023-02-17 ENCOUNTER — PATIENT MESSAGE (OUTPATIENT)
Dept: PRIMARY CARE CLINIC | Age: 38
End: 2023-02-17

## 2023-02-17 DIAGNOSIS — E66.9 CLASS 2 OBESITY WITHOUT SERIOUS COMORBIDITY WITH BODY MASS INDEX (BMI) OF 39.0 TO 39.9 IN ADULT, UNSPECIFIED OBESITY TYPE: Primary | ICD-10-CM

## 2023-02-17 RX ORDER — SEMAGLUTIDE 0.25 MG/.5ML
0.25 INJECTION, SOLUTION SUBCUTANEOUS
Qty: 2 ML | Refills: 0 | Status: SHIPPED | OUTPATIENT
Start: 2023-02-17

## 2023-02-17 NOTE — TELEPHONE ENCOUNTER
From: Reinaldo Perry  To: Redd Gupta  Sent: 2/17/2023 10:06 AM EST  Subject: Starting ULJNFH    Hi   I am still having no luck with the addipex and at my last appointment we spoke about [facility]. I looked it up and it says that my insurance covers it so I think I would like to go that route. How long do I need to be off the addipex before I can start this? I have actually gained weight back while taking the medication.      Thanks,  [employer]

## 2023-02-20 ENCOUNTER — TELEPHONE (OUTPATIENT)
Dept: PRIMARY CARE CLINIC | Age: 38
End: 2023-02-20

## 2023-02-22 NOTE — TELEPHONE ENCOUNTER
PA for [facility] sent to plan via CoverMyMeds.
Request Reference Number: [id number]. WEGOVY INJ 0.25MG is approved through 09/20/2023. Your patient may now fill this prescription and it will be covered.
Female

## 2023-03-27 DIAGNOSIS — E66.9 CLASS 2 OBESITY WITHOUT SERIOUS COMORBIDITY WITH BODY MASS INDEX (BMI) OF 39.0 TO 39.9 IN ADULT, UNSPECIFIED OBESITY TYPE: ICD-10-CM

## 2023-03-27 RX ORDER — SEMAGLUTIDE 0.5 MG/.5ML
0.5 INJECTION, SOLUTION SUBCUTANEOUS
Qty: 2 ML | Refills: 0 | Status: SHIPPED | OUTPATIENT
Start: 2023-03-27

## 2023-03-27 RX ORDER — SEMAGLUTIDE 0.25 MG/.5ML
0.25 INJECTION, SOLUTION SUBCUTANEOUS
Qty: 2 ML | Refills: 0 | Status: CANCELLED | OUTPATIENT
Start: 2023-03-27

## 2023-03-27 NOTE — TELEPHONE ENCOUNTER
Writer spoke with the patient and she states that she is feeling better on Wegovy then the Adipex and is tolerating the current dose of Wegovy well. The patient is willing to increased to 05.mg dose of the [facility] RGAYSON HERNANDEZ, per recommendation of her PCP and would like this sent to the pharmacy for her.

## 2023-04-17 DIAGNOSIS — F33.1 MODERATE EPISODE OF RECURRENT MAJOR DEPRESSIVE DISORDER (HCC): ICD-10-CM

## 2023-04-17 RX ORDER — BUPROPION HYDROCHLORIDE 150 MG/1
150 TABLET ORAL EVERY MORNING
Qty: 90 TABLET | Refills: 0 | Status: SHIPPED | OUTPATIENT
Start: 2023-04-17

## 2023-04-24 RX ORDER — SEMAGLUTIDE 1 MG/.5ML
1 INJECTION, SOLUTION SUBCUTANEOUS
Qty: 2 ML | Refills: 0 | Status: SHIPPED
Start: 2023-04-24 | End: 2023-05-25 | Stop reason: DRUGHIGH

## 2023-05-02 ENCOUNTER — OFFICE VISIT (OUTPATIENT)
Dept: PRIMARY CARE CLINIC | Age: 38
End: 2023-05-02
Payer: COMMERCIAL

## 2023-05-02 VITALS
DIASTOLIC BLOOD PRESSURE: 84 MMHG | RESPIRATION RATE: 16 BRPM | BODY MASS INDEX: 38.83 KG/M2 | SYSTOLIC BLOOD PRESSURE: 126 MMHG | HEIGHT: 59 IN | OXYGEN SATURATION: 99 % | WEIGHT: 192.6 LBS | HEART RATE: 103 BPM

## 2023-05-02 DIAGNOSIS — Z00.00 ANNUAL PHYSICAL EXAM: Primary | ICD-10-CM

## 2023-05-02 DIAGNOSIS — E78.5 DYSLIPIDEMIA: ICD-10-CM

## 2023-05-02 DIAGNOSIS — E72.11 MTHFR (METHYLENE THF REDUCTASE) DEFICIENCY AND HOMOCYSTINURIA (HCC): ICD-10-CM

## 2023-05-02 DIAGNOSIS — E72.12 MTHFR (METHYLENE THF REDUCTASE) DEFICIENCY AND HOMOCYSTINURIA (HCC): ICD-10-CM

## 2023-05-02 DIAGNOSIS — F33.1 MODERATE EPISODE OF RECURRENT MAJOR DEPRESSIVE DISORDER (HCC): ICD-10-CM

## 2023-05-02 DIAGNOSIS — J06.9 UPPER RESPIRATORY TRACT INFECTION, UNSPECIFIED TYPE: ICD-10-CM

## 2023-05-02 PROBLEM — Q51.3 BICORNUATE UTERUS: Status: ACTIVE | Noted: 2023-02-01

## 2023-05-02 PROCEDURE — 99395 PREV VISIT EST AGE 18-39: CPT | Performed by: PHYSICIAN ASSISTANT

## 2023-05-02 RX ORDER — BENZONATATE 200 MG/1
200 CAPSULE ORAL 2 TIMES DAILY PRN
Qty: 20 CAPSULE | Refills: 0 | Status: SHIPPED | OUTPATIENT
Start: 2023-05-02 | End: 2023-05-22

## 2023-05-02 RX ORDER — AZITHROMYCIN 250 MG/1
250 TABLET, FILM COATED ORAL SEE ADMIN INSTRUCTIONS
Qty: 6 TABLET | Refills: 0 | Status: SHIPPED | OUTPATIENT
Start: 2023-05-02 | End: 2023-05-07

## 2023-05-02 RX ORDER — BUPROPION HYDROCHLORIDE 150 MG/1
150 TABLET ORAL EVERY MORNING
Qty: 90 TABLET | Refills: 1 | Status: SHIPPED | OUTPATIENT
Start: 2023-05-02

## 2023-05-02 SDOH — ECONOMIC STABILITY: INCOME INSECURITY: HOW HARD IS IT FOR YOU TO PAY FOR THE VERY BASICS LIKE FOOD, HOUSING, MEDICAL CARE, AND HEATING?: NOT HARD AT ALL

## 2023-05-02 SDOH — ECONOMIC STABILITY: HOUSING INSECURITY
IN THE LAST 12 MONTHS, WAS THERE A TIME WHEN YOU DID NOT HAVE A STEADY PLACE TO SLEEP OR SLEPT IN A SHELTER (INCLUDING NOW)?: NO

## 2023-05-02 SDOH — ECONOMIC STABILITY: FOOD INSECURITY: WITHIN THE PAST 12 MONTHS, THE FOOD YOU BOUGHT JUST DIDN'T LAST AND YOU DIDN'T HAVE MONEY TO GET MORE.: NEVER TRUE

## 2023-05-02 SDOH — ECONOMIC STABILITY: FOOD INSECURITY: WITHIN THE PAST 12 MONTHS, YOU WORRIED THAT YOUR FOOD WOULD RUN OUT BEFORE YOU GOT MONEY TO BUY MORE.: NEVER TRUE

## 2023-05-02 ASSESSMENT — PATIENT HEALTH QUESTIONNAIRE - PHQ9
3. TROUBLE FALLING OR STAYING ASLEEP: 3
6. FEELING BAD ABOUT YOURSELF - OR THAT YOU ARE A FAILURE OR HAVE LET YOURSELF OR YOUR FAMILY DOWN: 0
4. FEELING TIRED OR HAVING LITTLE ENERGY: 3
SUM OF ALL RESPONSES TO PHQ QUESTIONS 1-9: 7
5. POOR APPETITE OR OVEREATING: 0
10. IF YOU CHECKED OFF ANY PROBLEMS, HOW DIFFICULT HAVE THESE PROBLEMS MADE IT FOR YOU TO DO YOUR WORK, TAKE CARE OF THINGS AT HOME, OR GET ALONG WITH OTHER PEOPLE: 1
1. LITTLE INTEREST OR PLEASURE IN DOING THINGS: 0
9. THOUGHTS THAT YOU WOULD BE BETTER OFF DEAD, OR OF HURTING YOURSELF: 0
SUM OF ALL RESPONSES TO PHQ QUESTIONS 1-9: 7
SUM OF ALL RESPONSES TO PHQ9 QUESTIONS 1 & 2: 0
SUM OF ALL RESPONSES TO PHQ QUESTIONS 1-9: 7
SUM OF ALL RESPONSES TO PHQ QUESTIONS 1-9: 7
8. MOVING OR SPEAKING SO SLOWLY THAT OTHER PEOPLE COULD HAVE NOTICED. OR THE OPPOSITE, BEING SO FIGETY OR RESTLESS THAT YOU HAVE BEEN MOVING AROUND A LOT MORE THAN USUAL: 0
7. TROUBLE CONCENTRATING ON THINGS, SUCH AS READING THE NEWSPAPER OR WATCHING TELEVISION: 1
2. FEELING DOWN, DEPRESSED OR HOPELESS: 0

## 2023-05-02 ASSESSMENT — ENCOUNTER SYMPTOMS
SINUS PAIN: 0
BACK PAIN: 0
RHINORRHEA: 0
ABDOMINAL PAIN: 0
CONSTIPATION: 0
DIARRHEA: 0
NAUSEA: 0
COUGH: 1
SHORTNESS OF BREATH: 0
VOMITING: 0

## 2023-05-02 NOTE — PROGRESS NOTES
[facility] PRIMARY CARE  [facility]  [address]  [address] [address]  Dept: [phone]  Dept Fax: [phone]    Providence Canavan is a 45 y.o. female who presents today for her medical conditions/complaints as noted below. Chief Complaint   Patient presents with    Annual Exam     Noticing cough and drainage x 2 weeks cough is worsening has concern for pneumonia       HPI:     Patient presents to the office for annual physical exam.  She has past medical history including GERD, migraines, obesity, depression, MTHFR. Patient is following with neurology for management of migraines. This is stable on amitriptyline. She is currently using Wegovy once weekly for management of weight loss. She reports she is down 9 pounds since starting. This medication is going very well. Denies significant side effects. She will be increasing to 1 mg next week. She would like to continue this medication. She is working on reducing portion sizes and excess carbohydrates. She has intentions to increase activity when the weather changes. Over the last 1 to 2 weeks she has noticed increased cough and postnasal drainage. Symptoms have been worsening. No fevers. No chills. No sore throat. No shortness of breath or chest pain. No other concerns or complaints. We reviewed health maintenance. BP stable.         Hemoglobin A1C (%)   Date Value   06/07/2022 5.8   01/18/2022 5.7   01/03/2020 5.3             ( goal A1C is < 7)   No results found for: LABMICR  LDL Cholesterol (mg/dL)   Date Value   01/18/2022 80   03/01/2017 116   07/14/2015 130       (goal LDL is <100)   AST (U/L)   Date Value   01/18/2022 12     ALT (U/L)   Date Value   01/18/2022 6     BUN (mg/dL)   Date Value   08/02/2022 9     BP Readings from Last 3 Encounters:   05/02/23 126/84   04/10/23 131/89   01/10/23 118/72          (goal 120/80)    Past Medical History:   Diagnosis Date    Abnormal brain MRI

## 2023-05-25 ENCOUNTER — PATIENT MESSAGE (OUTPATIENT)
Dept: PRIMARY CARE CLINIC | Age: 38
End: 2023-05-25

## 2023-05-25 DIAGNOSIS — E66.9 CLASS 2 OBESITY WITHOUT SERIOUS COMORBIDITY WITH BODY MASS INDEX (BMI) OF 39.0 TO 39.9 IN ADULT, UNSPECIFIED OBESITY TYPE: Primary | ICD-10-CM

## 2023-05-25 RX ORDER — SEMAGLUTIDE 1.7 MG/.75ML
1.7 INJECTION, SOLUTION SUBCUTANEOUS
Qty: 3 ML | Refills: 0 | Status: SHIPPED | OUTPATIENT
Start: 2023-05-25

## 2023-05-25 NOTE — TELEPHONE ENCOUNTER
From: Yony Odom  To: Feng Rubin  Sent: 5/25/2023 3:12 PM EDT  Subject: Next RX of Aldon Pole,  I wanted to let you know that I took my last shot of the 1mg. I weighed myself this week and was at 185!! I have been walking 2-3 days a week at least 2 miles each time. My daughter is out of school now so on my 2 days off a week we will also being doing more outside and walking/biking. I always like to check in when I take the last injection due to the pharmacy needing time to order.      Thank Enrico Callejas

## 2023-06-29 ENCOUNTER — PATIENT MESSAGE (OUTPATIENT)
Dept: PRIMARY CARE CLINIC | Age: 38
End: 2023-06-29

## 2023-06-29 DIAGNOSIS — E66.9 CLASS 2 OBESITY WITHOUT SERIOUS COMORBIDITY WITH BODY MASS INDEX (BMI) OF 39.0 TO 39.9 IN ADULT, UNSPECIFIED OBESITY TYPE: Primary | ICD-10-CM

## 2023-06-29 DIAGNOSIS — K21.9 GASTROESOPHAGEAL REFLUX DISEASE WITHOUT ESOPHAGITIS: Chronic | ICD-10-CM

## 2023-06-29 RX ORDER — PANTOPRAZOLE SODIUM 40 MG/1
40 TABLET, DELAYED RELEASE ORAL DAILY
Qty: 90 TABLET | Refills: 1 | Status: SHIPPED | OUTPATIENT
Start: 2023-06-29 | End: 2024-06-28

## 2023-06-30 RX ORDER — SEMAGLUTIDE 2.4 MG/.75ML
2.4 INJECTION, SOLUTION SUBCUTANEOUS
Qty: 3 ML | Refills: 2 | Status: SHIPPED | OUTPATIENT
Start: 2023-06-30

## 2023-07-24 DIAGNOSIS — E66.9 CLASS 2 OBESITY WITHOUT SERIOUS COMORBIDITY WITH BODY MASS INDEX (BMI) OF 39.0 TO 39.9 IN ADULT, UNSPECIFIED OBESITY TYPE: ICD-10-CM

## 2023-07-24 RX ORDER — SEMAGLUTIDE 2.4 MG/.75ML
2.4 INJECTION, SOLUTION SUBCUTANEOUS
Qty: 3 ML | Refills: 2 | Status: SHIPPED | OUTPATIENT
Start: 2023-07-24

## 2023-08-29 ENCOUNTER — OFFICE VISIT (OUTPATIENT)
Dept: PRIMARY CARE CLINIC | Age: 38
End: 2023-08-29
Payer: COMMERCIAL

## 2023-08-29 VITALS
WEIGHT: 171 LBS | DIASTOLIC BLOOD PRESSURE: 80 MMHG | BODY MASS INDEX: 34.47 KG/M2 | RESPIRATION RATE: 16 BRPM | SYSTOLIC BLOOD PRESSURE: 122 MMHG | HEART RATE: 72 BPM | OXYGEN SATURATION: 98 % | HEIGHT: 59 IN

## 2023-08-29 DIAGNOSIS — R73.03 PREDIABETES: ICD-10-CM

## 2023-08-29 DIAGNOSIS — E53.8 B12 DEFICIENCY: ICD-10-CM

## 2023-08-29 DIAGNOSIS — E66.9 CLASS 2 OBESITY WITHOUT SERIOUS COMORBIDITY WITH BODY MASS INDEX (BMI) OF 39.0 TO 39.9 IN ADULT, UNSPECIFIED OBESITY TYPE: Primary | ICD-10-CM

## 2023-08-29 DIAGNOSIS — K21.9 GASTROESOPHAGEAL REFLUX DISEASE WITHOUT ESOPHAGITIS: Chronic | ICD-10-CM

## 2023-08-29 DIAGNOSIS — F33.1 MODERATE EPISODE OF RECURRENT MAJOR DEPRESSIVE DISORDER (HCC): ICD-10-CM

## 2023-08-29 PROCEDURE — 99214 OFFICE O/P EST MOD 30 MIN: CPT | Performed by: PHYSICIAN ASSISTANT

## 2023-08-29 PROCEDURE — G8427 DOCREV CUR MEDS BY ELIG CLIN: HCPCS | Performed by: PHYSICIAN ASSISTANT

## 2023-08-29 PROCEDURE — 1036F TOBACCO NON-USER: CPT | Performed by: PHYSICIAN ASSISTANT

## 2023-08-29 PROCEDURE — G8417 CALC BMI ABV UP PARAM F/U: HCPCS | Performed by: PHYSICIAN ASSISTANT

## 2023-08-29 RX ORDER — SEMAGLUTIDE 2.4 MG/.75ML
2.4 INJECTION, SOLUTION SUBCUTANEOUS
Qty: 3 ML | Refills: 2 | Status: SHIPPED | OUTPATIENT
Start: 2023-08-29

## 2023-08-29 RX ORDER — PANTOPRAZOLE SODIUM 40 MG/1
40 TABLET, DELAYED RELEASE ORAL DAILY
Qty: 90 TABLET | Refills: 1 | Status: SHIPPED | OUTPATIENT
Start: 2023-08-29 | End: 2024-08-28

## 2023-08-29 RX ORDER — BUPROPION HYDROCHLORIDE 150 MG/1
150 TABLET ORAL EVERY MORNING
Qty: 90 TABLET | Refills: 1 | Status: SHIPPED | OUTPATIENT
Start: 2023-08-29

## 2023-08-29 ASSESSMENT — PATIENT HEALTH QUESTIONNAIRE - PHQ9
8. MOVING OR SPEAKING SO SLOWLY THAT OTHER PEOPLE COULD HAVE NOTICED. OR THE OPPOSITE, BEING SO FIGETY OR RESTLESS THAT YOU HAVE BEEN MOVING AROUND A LOT MORE THAN USUAL: 0
1. LITTLE INTEREST OR PLEASURE IN DOING THINGS: 0
4. FEELING TIRED OR HAVING LITTLE ENERGY: 3
9. THOUGHTS THAT YOU WOULD BE BETTER OFF DEAD, OR OF HURTING YOURSELF: 0
SUM OF ALL RESPONSES TO PHQ QUESTIONS 1-9: 6
3. TROUBLE FALLING OR STAYING ASLEEP: 3
7. TROUBLE CONCENTRATING ON THINGS, SUCH AS READING THE NEWSPAPER OR WATCHING TELEVISION: 0
SUM OF ALL RESPONSES TO PHQ QUESTIONS 1-9: 6
SUM OF ALL RESPONSES TO PHQ QUESTIONS 1-9: 6
10. IF YOU CHECKED OFF ANY PROBLEMS, HOW DIFFICULT HAVE THESE PROBLEMS MADE IT FOR YOU TO DO YOUR WORK, TAKE CARE OF THINGS AT HOME, OR GET ALONG WITH OTHER PEOPLE: 1
SUM OF ALL RESPONSES TO PHQ QUESTIONS 1-9: 6
6. FEELING BAD ABOUT YOURSELF - OR THAT YOU ARE A FAILURE OR HAVE LET YOURSELF OR YOUR FAMILY DOWN: 0
SUM OF ALL RESPONSES TO PHQ9 QUESTIONS 1 & 2: 0
2. FEELING DOWN, DEPRESSED OR HOPELESS: 0
5. POOR APPETITE OR OVEREATING: 0

## 2023-08-29 ASSESSMENT — ENCOUNTER SYMPTOMS
ABDOMINAL PAIN: 0
CONSTIPATION: 0
COUGH: 0
BACK PAIN: 0
VOMITING: 0
RHINORRHEA: 0
SHORTNESS OF BREATH: 0
NAUSEA: 0
SINUS PAIN: 0
DIARRHEA: 0

## 2023-08-29 NOTE — PROGRESS NOTES
[address]  [place] [address]  Dept: [phone]  Dept Fax: [phone]    Jorge Sosa is a 45 y.o. female who presents today for her medical conditions/complaints as noted below. Chief Complaint   Patient presents with    Weight Management     3 month follow up        HPI:     Patient presents to the office for routine follow-up. She has past medical history including GERD, depression, obesity. Today, primary concern is following up on weight and Wegovy. Patient has lost close to 20 pounds since last office visit. She reports she is monitoring portion sizes and processed foods. She is increasing protein such as eggs and chicken. She does admit to lack of activity. She is walking maybe a few miles per week. 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 She is happy so far with weight loss and would like to continue. Denies any side effect Wegovy. Otherwise, patient denies any acute concerns or complications. She is tolerating current medication regimen without side effects. Blood pressure stable. Patient is following routinely with neurology for management of migraines. Stable with amitriptyline.         Hemoglobin A1C (%)   Date Value   06/07/2022 5.8   01/18/2022 5.7   01/03/2020 5.3             ( goal A1C is < 7)   No components found for: LABMICR  LDL Cholesterol (mg/dL)   Date Value   01/18/2022 80   03/01/2017 116   07/14/2015 130       (goal LDL is <100)   AST (U/L)   Date Value   01/18/2022 12     ALT (U/L)   Date Value   01/18/2022 6     BUN (mg/dL)   Date Value   08/02/2022 9     BP Readings from Last 3 Encounters:   08/29/23 122/80   05/02/23 126/84   04/10/23 131/89          (goal 120/80)    Past Medical History:   Diagnosis Date    Abnormal brain MRI     Anxiety     Bicornuate uterus     Diverticulosis     GERD (gastroesophageal reflux disease)     Headache     Hx of migraine headaches     Migraine     I

## 2023-08-30 ENCOUNTER — TELEPHONE (OUTPATIENT)
Dept: PRIMARY CARE CLINIC | Age: 38
End: 2023-08-30

## 2023-09-05 NOTE — TELEPHONE ENCOUNTER
Request Reference Number: [id number]. Mallory Commander 2.4MG is approved through 02/29/2024. Your patient may now fill this prescription and it will be covered.

## 2023-10-16 ENCOUNTER — HOSPITAL ENCOUNTER (EMERGENCY)
Facility: CLINIC | Age: 38
Discharge: HOME | End: 2023-10-16
Attending: EMERGENCY MEDICINE
Payer: COMMERCIAL

## 2023-10-16 VITALS
BODY MASS INDEX: 33.87 KG/M2 | HEART RATE: 77 BPM | RESPIRATION RATE: 18 BRPM | SYSTOLIC BLOOD PRESSURE: 152 MMHG | WEIGHT: 168 LBS | TEMPERATURE: 97.9 F | DIASTOLIC BLOOD PRESSURE: 88 MMHG | HEIGHT: 59 IN | OXYGEN SATURATION: 98 %

## 2023-10-16 DIAGNOSIS — M43.6 TORTICOLLIS: Primary | ICD-10-CM

## 2023-10-16 PROCEDURE — 99284 EMERGENCY DEPT VISIT MOD MDM: CPT

## 2023-10-16 PROCEDURE — 6360000002 HC RX W HCPCS: Performed by: REGISTERED NURSE

## 2023-10-16 PROCEDURE — 96372 THER/PROPH/DIAG INJ SC/IM: CPT

## 2023-10-16 RX ORDER — PREDNISONE 20 MG/1
40 TABLET ORAL DAILY
Qty: 10 TABLET | Refills: 0 | Status: SHIPPED | OUTPATIENT
Start: 2023-10-16 | End: 2023-10-21

## 2023-10-16 RX ORDER — ORPHENADRINE CITRATE 30 MG/ML
60 INJECTION INTRAMUSCULAR; INTRAVENOUS ONCE
Status: COMPLETED | OUTPATIENT
Start: 2023-10-16 | End: 2023-10-16

## 2023-10-16 RX ORDER — LIDOCAINE 50 MG/G
1 PATCH TOPICAL DAILY
Qty: 10 PATCH | Refills: 0 | Status: SHIPPED | OUTPATIENT
Start: 2023-10-16 | End: 2023-10-26

## 2023-10-16 RX ORDER — BACLOFEN 10 MG/1
10 TABLET ORAL 3 TIMES DAILY
Qty: 15 TABLET | Refills: 0 | Status: SHIPPED | OUTPATIENT
Start: 2023-10-16

## 2023-10-16 RX ADMIN — ORPHENADRINE CITRATE 60 MG: 60 INJECTION INTRAMUSCULAR; INTRAVENOUS at 18:53

## 2023-10-16 ASSESSMENT — PAIN - FUNCTIONAL ASSESSMENT: PAIN_FUNCTIONAL_ASSESSMENT: 0-10

## 2023-10-16 ASSESSMENT — PAIN SCALES - GENERAL: PAINLEVEL_OUTOF10: 7

## 2023-10-16 NOTE — ED PROVIDER NOTES
[address] [facility]  eMERGENCY dEPARTMENT eNCOUnter   Independent Attestation     Pt Name: Latesha Covarrubias  MRN: [id number]  [address] [facility] 1985  Date of evaluation: 10/16/23       Latesha Covarrubias is a 45 y.o. female who presents with Neck Pain (Pt woke up with neck pain, has headache and lower back pain too)        Based on the medical record, the care appears appropriate. I was personally available for consultation in the Emergency Department.     Ashley Monk DO  Attending Emergency  Physician                Ashley Monk DO  10/16/23 Antwon Lo

## 2023-10-16 NOTE — DISCHARGE INSTRUCTIONS
PLEASE RETURN TO THE EMERGENCY DEPARTMENT IMMEDIATELY if your symptoms worsen in anyway or in 1-2 days if not improved for re-evaluation. You should immediately return to the ER for symptoms such as worsening pain, abdominal pain, bloody stools, numbness or weakness to the arms or legs, difficulty with urination or defecation, difficulty with ambulation, fever, coolness or color change to the extremity, chest pain, shortness of breath, a feeling that you are going to pass out, light headed, dizziness. Take your medication as indicated and prescribed. If you are given an antibiotic then, make sure you get the prescription filled and take the antibiotics until finished. Please understand that at this time there is no evidence for a more serious underlying process, but that early in the process of an illness or injury, an emergency department workup can be falsely reassuring. You should contact your family doctor within the next 48 hours for a follow up appointment    [facility]!!!    From [facility]) and [facility] Emergency Services    On behalf of the Emergency Department staff at [facility]), I would like to thank you for giving us the opportunity to address your health care needs and concerns. We hope that during your visit, our service was delivered in a professional and caring manner. Please keep [facility]) in mind as we walk with you down the path to your own personal wellness. Please expect an automated text message or email from us so we can ask a few questions about your health and progress. Based on your answers, a clinician may call you back to offer help and instructions. Please understand that early in the process of an illness or injury, an emergency department workup can be falsely reassuring. If you notice any worsening, changing or persistent symptoms please call your family doctor or return to the ER immediately.      Tell us how we did during your visit at

## 2023-10-17 ASSESSMENT — ENCOUNTER SYMPTOMS
NAUSEA: 0
ABDOMINAL PAIN: 0
VOMITING: 0
BACK PAIN: 0
COUGH: 0
SHORTNESS OF BREATH: 0
DIARRHEA: 0

## 2023-10-17 NOTE — ED PROVIDER NOTES
[facility]  [address]  Phone: [phone]        Pt Name: Wendy Whitten  MRN: [id number]  [address] [facility] 1985  Date of evaluation: 10/17/23    CHIEFCOMPLAINT       Chief Complaint   Patient presents with    Neck Pain     Pt woke up with neck pain, has headache and lower back pain too       HISTORY OF PRESENT ILLNESS (Location/Symptom, Timing/Onset, Context/Setting, Quality, Duration, Modifying Factors, Severity)      Wendy Whitten is a 45 y.o. female with no pertinent PMH who presents to the ED via private auto with neck stiffness and discomfort. Patient she woke up this morning after not sleeping well complaining of neck stiffness that has worsened throughout the day. She denies any fevers, does states she has a mild headache as well. She denies any chest pain, shortness breath and difficulty breathing vision changes, dizziness. She denies hitting her head or any falls. She is taken Motrin with minimal relief of symptoms. She denies any past injuries or surgeries to her neck or back. She does have full range of motion but does have some discomfort. On arrival she is rest on the cot with even unlabored breaths and nontoxic-appearing with no acute distress noted. PAST MEDICAL / SURGICAL / SOCIAL / FAMILY HISTORY     PMH:  has a past medical history of Abnormal brain MRI, Anxiety, Bicornuate uterus, Diverticulosis, GERD (gastroesophageal reflux disease), Headache, Hx of migraine headaches, Migraine, Miscarriage, and Wears glasses. Surgical History:  has a past surgical history that includes Tubal ligation (10/2015) and Breast reduction surgery (10/2016). Social History:  reports that she has never smoked. She has never used smokeless tobacco. She reports that she does not currently use alcohol. She reports that she does not use drugs. Family History: She indicated that the status of her mother is unknown. She indicated that the status of her father is unknown.  She

## 2023-10-31 ENCOUNTER — OFFICE VISIT (OUTPATIENT)
Dept: NEUROLOGY | Age: 38
End: 2023-10-31
Payer: COMMERCIAL

## 2023-10-31 VITALS
SYSTOLIC BLOOD PRESSURE: 126 MMHG | HEART RATE: 105 BPM | DIASTOLIC BLOOD PRESSURE: 87 MMHG | HEIGHT: 59 IN | BODY MASS INDEX: 33.26 KG/M2 | WEIGHT: 165 LBS

## 2023-10-31 DIAGNOSIS — G43.109 MIGRAINE WITH AURA AND WITHOUT STATUS MIGRAINOSUS, NOT INTRACTABLE: Primary | ICD-10-CM

## 2023-10-31 DIAGNOSIS — M54.81 OCCIPITAL NEURALGIA, UNSPECIFIED LATERALITY: ICD-10-CM

## 2023-10-31 PROCEDURE — G8417 CALC BMI ABV UP PARAM F/U: HCPCS | Performed by: STUDENT IN AN ORGANIZED HEALTH CARE EDUCATION/TRAINING PROGRAM

## 2023-10-31 PROCEDURE — G8484 FLU IMMUNIZE NO ADMIN: HCPCS | Performed by: STUDENT IN AN ORGANIZED HEALTH CARE EDUCATION/TRAINING PROGRAM

## 2023-10-31 PROCEDURE — 1036F TOBACCO NON-USER: CPT | Performed by: STUDENT IN AN ORGANIZED HEALTH CARE EDUCATION/TRAINING PROGRAM

## 2023-10-31 PROCEDURE — 99214 OFFICE O/P EST MOD 30 MIN: CPT | Performed by: STUDENT IN AN ORGANIZED HEALTH CARE EDUCATION/TRAINING PROGRAM

## 2023-10-31 PROCEDURE — G8427 DOCREV CUR MEDS BY ELIG CLIN: HCPCS | Performed by: STUDENT IN AN ORGANIZED HEALTH CARE EDUCATION/TRAINING PROGRAM

## 2023-10-31 RX ORDER — PREDNISONE 20 MG/1
TABLET ORAL
Qty: 18 TABLET | Refills: 0 | Status: SHIPPED | OUTPATIENT
Start: 2023-10-31

## 2023-10-31 RX ORDER — AMITRIPTYLINE HYDROCHLORIDE 25 MG/1
50 TABLET, FILM COATED ORAL NIGHTLY
Qty: 90 TABLET | Refills: 3 | Status: SHIPPED | OUTPATIENT
Start: 2023-10-31

## 2023-10-31 NOTE — PROGRESS NOTES
[facility] NEUROLOGY SPECIALIST  [facility] [address]  Dept: [phone]    PATIENT NAME: Shannon Gilbert  PATIENT MRN: [id number]  PRIMARY CARE PHYSICIAN: Selena Santos    HPI:      Shannon Gilbert is a 45 y.o. female who presents to clinic today for follow up  of Migraine      Interim history: For headache prophylaxis, she is taking elavil 37.5 mg daily. She notes she is currently having two migraines a month. She is now having occipital headaches that started about 2 months ago. She notes the occiptal hedaches can last about 3 days. She describes it as a pulling sensation at the base of her neck and painful to look down. For abortive therapy, she is taking Zomig and notes it is effective for migraines but not occipital headaches. She notes paresthesias have improved. Currently not on any medications for paresthesias. Gabapentin was weaned off due to weight gain. Prior History:  Patient was last seen in 8/31/22. Topamax was weaned off at last visit. She is currently on elavil on 25 mg daily for migraine prophylaxis and rizatriptan for abortive therapy. She notes migraines are well controlled. Rizatriptan is not effective when she does have a migraine. She has had about two migraines since last visit. Denies any side effects of elavil. She had an EMG of BLE which was normal. Gabapentin 100 mg TID was also started for paresthesias. She notes since starting she has gained weight. She was already gaining weight prior to gabapentin but has gained more while on gabapentin. She has decreased gabapentin to 100 mg BID. She is using noom for the past 6 weeks and has lost only 2 pounds. She is following the with her gynecologist  for weight loss options and assess if it a hormonal issue. She notes her paresthesias have improved by 50 percent since starting gabapentin. She notes it occurs about 2-3 times a week. Prior History:   At last clinic visit, we had started to wean

## 2023-11-22 ENCOUNTER — HOSPITAL ENCOUNTER (OUTPATIENT)
Age: 38
Setting detail: SPECIMEN
Discharge: HOME | End: 2023-11-22

## 2023-11-22 DIAGNOSIS — R73.03 PREDIABETES: ICD-10-CM

## 2023-11-22 DIAGNOSIS — E53.8 B12 DEFICIENCY: ICD-10-CM

## 2023-11-22 LAB
ALBUMIN SERPL-MCNC: 4.2 G/DL (ref 3.5–5.2)
ALBUMIN/GLOB SERPL: 1 {RATIO} (ref 1–2.5)
ALP SERPL-CCNC: 103 U/L (ref 35–104)
ALT SERPL-CCNC: 8 U/L (ref 10–35)
ANION GAP SERPL CALCULATED.3IONS-SCNC: 12 MMOL/L (ref 9–16)
AST SERPL-CCNC: 16 U/L (ref 10–35)
BILIRUB SERPL-MCNC: 0.2 MG/DL (ref 0–1.2)
BUN SERPL-MCNC: 7 MG/DL (ref 6–20)
CALCIUM SERPL-MCNC: 9.5 MG/DL (ref 8.6–10.4)
CHLORIDE SERPL-SCNC: 104 MMOL/L (ref 98–107)
CHOLEST SERPL-MCNC: 175 MG/DL (ref 0–199)
CHOLESTEROL/HDL RATIO: 6
CO2 SERPL-SCNC: 23 MMOL/L (ref 20–31)
CREAT SERPL-MCNC: 0.7 MG/DL (ref 0.5–0.9)
EST. AVERAGE GLUCOSE BLD GHB EST-MCNC: 100 MG/DL
GFR SERPL CREATININE-BSD FRML MDRD: >60 ML/MIN/1.73M2
GLUCOSE SERPL-MCNC: 89 MG/DL (ref 74–99)
HBA1C MFR BLD: 5.1 % (ref 4–6)
HDLC SERPL-MCNC: 27 MG/DL
LDLC SERPL CALC-MCNC: 98 MG/DL (ref 0–100)
POTASSIUM SERPL-SCNC: 3.7 MMOL/L (ref 3.7–5.3)
PROT SERPL-MCNC: 7.1 G/DL (ref 6.6–8.7)
SODIUM SERPL-SCNC: 139 MMOL/L (ref 136–145)
TRIGL SERPL-MCNC: 250 MG/DL (ref 0–149)
VIT B12 SERPL-MCNC: 255 PG/ML (ref 232–1245)
VLDLC SERPL CALC-MCNC: 50 MG/DL

## 2023-12-05 ENCOUNTER — OFFICE VISIT (OUTPATIENT)
Dept: PRIMARY CARE CLINIC | Age: 38
End: 2023-12-05
Payer: COMMERCIAL

## 2023-12-05 VITALS
HEART RATE: 87 BPM | BODY MASS INDEX: 32.46 KG/M2 | HEIGHT: 59 IN | RESPIRATION RATE: 16 BRPM | SYSTOLIC BLOOD PRESSURE: 120 MMHG | WEIGHT: 161 LBS | OXYGEN SATURATION: 99 % | DIASTOLIC BLOOD PRESSURE: 80 MMHG

## 2023-12-05 DIAGNOSIS — F33.1 MODERATE EPISODE OF RECURRENT MAJOR DEPRESSIVE DISORDER (HCC): Primary | ICD-10-CM

## 2023-12-05 DIAGNOSIS — E66.9 OBESITY (BMI 30.0-34.9): ICD-10-CM

## 2023-12-05 PROBLEM — K57.92 DIVERTICULITIS: Status: ACTIVE | Noted: 2023-12-05

## 2023-12-05 PROBLEM — E66.811 OBESITY (BMI 30.0-34.9): Status: ACTIVE | Noted: 2022-06-03

## 2023-12-05 PROCEDURE — 1036F TOBACCO NON-USER: CPT | Performed by: PHYSICIAN ASSISTANT

## 2023-12-05 PROCEDURE — G8417 CALC BMI ABV UP PARAM F/U: HCPCS | Performed by: PHYSICIAN ASSISTANT

## 2023-12-05 PROCEDURE — G8427 DOCREV CUR MEDS BY ELIG CLIN: HCPCS | Performed by: PHYSICIAN ASSISTANT

## 2023-12-05 PROCEDURE — G8484 FLU IMMUNIZE NO ADMIN: HCPCS | Performed by: PHYSICIAN ASSISTANT

## 2023-12-05 PROCEDURE — 99214 OFFICE O/P EST MOD 30 MIN: CPT | Performed by: PHYSICIAN ASSISTANT

## 2023-12-05 RX ORDER — SEMAGLUTIDE 2.4 MG/.75ML
2.4 INJECTION, SOLUTION SUBCUTANEOUS
Qty: 3 ML | Refills: 2 | Status: SHIPPED | OUTPATIENT
Start: 2023-12-05

## 2023-12-05 RX ORDER — BUPROPION HYDROCHLORIDE 150 MG/1
150 TABLET ORAL EVERY MORNING
Qty: 90 TABLET | Refills: 1 | Status: SHIPPED | OUTPATIENT
Start: 2023-12-05

## 2023-12-05 ASSESSMENT — COLUMBIA-SUICIDE SEVERITY RATING SCALE - C-SSRS
3. HAVE YOU BEEN THINKING ABOUT HOW YOU MIGHT KILL YOURSELF?: NO
7. DID THIS OCCUR IN THE LAST THREE MONTHS: NO
5. HAVE YOU STARTED TO WORK OUT OR WORKED OUT THE DETAILS OF HOW TO KILL YOURSELF? DO YOU INTEND TO CARRY OUT THIS PLAN?: NO
4. HAVE YOU HAD THESE THOUGHTS AND HAD SOME INTENTION OF ACTING ON THEM?: NO

## 2023-12-05 ASSESSMENT — PATIENT HEALTH QUESTIONNAIRE - PHQ9
SUM OF ALL RESPONSES TO PHQ QUESTIONS 1-9: 6
7. TROUBLE CONCENTRATING ON THINGS, SUCH AS READING THE NEWSPAPER OR WATCHING TELEVISION: 0
SUM OF ALL RESPONSES TO PHQ9 QUESTIONS 1 & 2: 0
1. LITTLE INTEREST OR PLEASURE IN DOING THINGS: 0
SUM OF ALL RESPONSES TO PHQ QUESTIONS 1-9: 6
10. IF YOU CHECKED OFF ANY PROBLEMS, HOW DIFFICULT HAVE THESE PROBLEMS MADE IT FOR YOU TO DO YOUR WORK, TAKE CARE OF THINGS AT HOME, OR GET ALONG WITH OTHER PEOPLE: 1
9. THOUGHTS THAT YOU WOULD BE BETTER OFF DEAD, OR OF HURTING YOURSELF: 0
SUM OF ALL RESPONSES TO PHQ QUESTIONS 1-9: 6
4. FEELING TIRED OR HAVING LITTLE ENERGY: 3
2. FEELING DOWN, DEPRESSED OR HOPELESS: 0
8. MOVING OR SPEAKING SO SLOWLY THAT OTHER PEOPLE COULD HAVE NOTICED. OR THE OPPOSITE, BEING SO FIGETY OR RESTLESS THAT YOU HAVE BEEN MOVING AROUND A LOT MORE THAN USUAL: 0
6. FEELING BAD ABOUT YOURSELF - OR THAT YOU ARE A FAILURE OR HAVE LET YOURSELF OR YOUR FAMILY DOWN: 0
3. TROUBLE FALLING OR STAYING ASLEEP: 3
SUM OF ALL RESPONSES TO PHQ QUESTIONS 1-9: 6
5. POOR APPETITE OR OVEREATING: 0

## 2023-12-05 ASSESSMENT — ENCOUNTER SYMPTOMS
VOMITING: 0
DIARRHEA: 0
SINUS PAIN: 0
BACK PAIN: 0
NAUSEA: 0
RHINORRHEA: 0
SHORTNESS OF BREATH: 0
CONSTIPATION: 0
COUGH: 0
ABDOMINAL PAIN: 0

## 2023-12-05 NOTE — PROGRESS NOTES
[address]  [place] [address]  Dept: [phone]  Dept Fax: [phone]    Sushil Gu is a 45 y.o. female who presents today for her medical conditions/complaints as noted below. Chief Complaint   Patient presents with    Hypertension       HPI:     Patient presents the office for routine follow-up. She has past medical history including GERD, obesity, migraine, depression, sleep apnea. Patient is following routinely with neurology for management of chronic migraine, neck pain, obstructive sleep apnea. They recently adjusted amitriptyline to 50 mg daily. She is currently taking Wegovy 2.4 mg weekly. This is working very well for weight loss. She is down 10 pounds from last office visit. She denies any side effect of medication. She is monitoring portions and starchy carbs. Overall she is down close to 40 pounds. She reports she is feeling great with this weight loss. She would like to continue this journey. Patient reports her mood is very well controlled. Denies any side effects to Wellbutrin. Denies any exacerbation of depression. She has interest in activities and is sleeping well. No other concerns or complaints.         Hemoglobin A1C (%)   Date Value   11/22/2023 5.1   06/07/2022 5.8   01/18/2022 5.7             ( goal A1C is < 7)   No components found for: \"LABMICR\"  LDL Cholesterol (mg/dL)   Date Value   11/22/2023 98   01/18/2022 80   03/01/2017 116       (goal LDL is <100)   AST (U/L)   Date Value   11/22/2023 16     ALT (U/L)   Date Value   11/22/2023 8 (L)     BUN (mg/dL)   Date Value   11/22/2023 7     BP Readings from Last 3 Encounters:   12/05/23 120/80   10/31/23 126/87   10/16/23 (!) 152/88          (goal 120/80)    Past Medical History:   Diagnosis Date    Abnormal brain MRI     Anxiety     Bicornuate uterus     Diverticulosis     GERD (gastroesophageal reflux disease)     Headache

## 2024-01-09 ENCOUNTER — OFFICE VISIT (OUTPATIENT)
Dept: NEUROLOGY | Age: 39
End: 2024-01-09
Payer: COMMERCIAL

## 2024-01-09 VITALS
DIASTOLIC BLOOD PRESSURE: 94 MMHG | HEIGHT: 59 IN | SYSTOLIC BLOOD PRESSURE: 137 MMHG | HEART RATE: 108 BPM | BODY MASS INDEX: 32.05 KG/M2 | WEIGHT: 159 LBS

## 2024-01-09 DIAGNOSIS — M54.81 OCCIPITAL NEURALGIA, UNSPECIFIED LATERALITY: ICD-10-CM

## 2024-01-09 DIAGNOSIS — G43.109 MIGRAINE WITH AURA AND WITHOUT STATUS MIGRAINOSUS, NOT INTRACTABLE: Primary | ICD-10-CM

## 2024-01-09 PROCEDURE — 99214 OFFICE O/P EST MOD 30 MIN: CPT | Performed by: STUDENT IN AN ORGANIZED HEALTH CARE EDUCATION/TRAINING PROGRAM

## 2024-01-09 PROCEDURE — G8484 FLU IMMUNIZE NO ADMIN: HCPCS | Performed by: STUDENT IN AN ORGANIZED HEALTH CARE EDUCATION/TRAINING PROGRAM

## 2024-01-09 PROCEDURE — G8427 DOCREV CUR MEDS BY ELIG CLIN: HCPCS | Performed by: STUDENT IN AN ORGANIZED HEALTH CARE EDUCATION/TRAINING PROGRAM

## 2024-01-09 PROCEDURE — 1036F TOBACCO NON-USER: CPT | Performed by: STUDENT IN AN ORGANIZED HEALTH CARE EDUCATION/TRAINING PROGRAM

## 2024-01-09 PROCEDURE — G8417 CALC BMI ABV UP PARAM F/U: HCPCS | Performed by: STUDENT IN AN ORGANIZED HEALTH CARE EDUCATION/TRAINING PROGRAM

## 2024-01-09 RX ORDER — NORETHINDRONE ACETATE AND ETHINYL ESTRADIOL AND FERROUS FUMARATE 1MG-20(21)
1 KIT ORAL DAILY
COMMUNITY
Start: 2024-01-03

## 2024-01-09 RX ORDER — AMITRIPTYLINE HYDROCHLORIDE 75 MG/1
75 TABLET ORAL NIGHTLY
Qty: 30 TABLET | Refills: 2 | Status: SHIPPED | OUTPATIENT
Start: 2024-01-09 | End: 2025-01-02

## 2024-01-09 NOTE — PROGRESS NOTES
normal in 11/7/22  Paresthesias have improved   SPEP normal, B6 level normal  B12 was 255 on 11/22/23  Continue B12 supplementation    4.Occipital neuralgia:  Occipital tinel is positive  Increase amitriptyline 75 mg daily      Shama Martins MD   Neurology & Sleep Medicine  [facility]

## 2024-01-30 ENCOUNTER — TELEPHONE (OUTPATIENT)
Dept: PRIMARY CARE CLINIC | Age: 39
End: 2024-01-30

## 2024-01-30 ENCOUNTER — HOSPITAL ENCOUNTER (OUTPATIENT)
Dept: CT IMAGING | Age: 39
Discharge: HOME | End: 2024-02-01
Payer: COMMERCIAL

## 2024-01-30 ENCOUNTER — OFFICE VISIT (OUTPATIENT)
Dept: PRIMARY CARE CLINIC | Age: 39
End: 2024-01-30
Payer: COMMERCIAL

## 2024-01-30 VITALS
DIASTOLIC BLOOD PRESSURE: 76 MMHG | BODY MASS INDEX: 31.77 KG/M2 | HEART RATE: 99 BPM | RESPIRATION RATE: 16 BRPM | WEIGHT: 157.6 LBS | TEMPERATURE: 98.3 F | OXYGEN SATURATION: 99 % | HEIGHT: 59 IN | SYSTOLIC BLOOD PRESSURE: 108 MMHG

## 2024-01-30 DIAGNOSIS — R10.31 RIGHT GROIN PAIN: ICD-10-CM

## 2024-01-30 DIAGNOSIS — R10.31 RLQ ABDOMINAL PAIN: ICD-10-CM

## 2024-01-30 DIAGNOSIS — R10.31 RIGHT GROIN PAIN: Primary | ICD-10-CM

## 2024-01-30 DIAGNOSIS — J02.9 SORE THROAT: ICD-10-CM

## 2024-01-30 LAB
STREP PYOGENES DNA, POC: NEGATIVE
VALID INTERNAL CONTROL, POC: NORMAL

## 2024-01-30 PROCEDURE — G8484 FLU IMMUNIZE NO ADMIN: HCPCS | Performed by: PHYSICIAN ASSISTANT

## 2024-01-30 PROCEDURE — G8427 DOCREV CUR MEDS BY ELIG CLIN: HCPCS | Performed by: PHYSICIAN ASSISTANT

## 2024-01-30 PROCEDURE — 74177 CT ABD & PELVIS W/CONTRAST: CPT

## 2024-01-30 PROCEDURE — 6360000004 HC RX CONTRAST MEDICATION: Performed by: PHYSICIAN ASSISTANT

## 2024-01-30 PROCEDURE — 87651 STREP A DNA AMP PROBE: CPT | Performed by: PHYSICIAN ASSISTANT

## 2024-01-30 PROCEDURE — 2580000003 HC RX 258: Performed by: PHYSICIAN ASSISTANT

## 2024-01-30 PROCEDURE — G8417 CALC BMI ABV UP PARAM F/U: HCPCS | Performed by: PHYSICIAN ASSISTANT

## 2024-01-30 PROCEDURE — 99214 OFFICE O/P EST MOD 30 MIN: CPT | Performed by: PHYSICIAN ASSISTANT

## 2024-01-30 PROCEDURE — 1036F TOBACCO NON-USER: CPT | Performed by: PHYSICIAN ASSISTANT

## 2024-01-30 RX ORDER — 0.9 % SODIUM CHLORIDE 0.9 %
80 INTRAVENOUS SOLUTION INTRAVENOUS ONCE
Status: COMPLETED | OUTPATIENT
Start: 2024-01-30 | End: 2024-01-30

## 2024-01-30 RX ORDER — SODIUM CHLORIDE 0.9 % (FLUSH) 0.9 %
10 SYRINGE (ML) INJECTION ONCE
Status: COMPLETED | OUTPATIENT
Start: 2024-01-30 | End: 2024-01-30

## 2024-01-30 RX ADMIN — SODIUM CHLORIDE, PRESERVATIVE FREE 10 ML: 5 INJECTION INTRAVENOUS at 14:27

## 2024-01-30 RX ADMIN — IOPAMIDOL 75 ML: 755 INJECTION, SOLUTION INTRAVENOUS at 14:27

## 2024-01-30 RX ADMIN — SODIUM CHLORIDE 80 ML: 9 INJECTION, SOLUTION INTRAVENOUS at 14:27

## 2024-01-30 ASSESSMENT — PATIENT HEALTH QUESTIONNAIRE - PHQ9
SUM OF ALL RESPONSES TO PHQ9 QUESTIONS 1 & 2: 0
6. FEELING BAD ABOUT YOURSELF - OR THAT YOU ARE A FAILURE OR HAVE LET YOURSELF OR YOUR FAMILY DOWN: 0
SUM OF ALL RESPONSES TO PHQ QUESTIONS 1-9: 6
8. MOVING OR SPEAKING SO SLOWLY THAT OTHER PEOPLE COULD HAVE NOTICED. OR THE OPPOSITE, BEING SO FIGETY OR RESTLESS THAT YOU HAVE BEEN MOVING AROUND A LOT MORE THAN USUAL: 0
5. POOR APPETITE OR OVEREATING: 0
2. FEELING DOWN, DEPRESSED OR HOPELESS: 0
SUM OF ALL RESPONSES TO PHQ QUESTIONS 1-9: 6
10. IF YOU CHECKED OFF ANY PROBLEMS, HOW DIFFICULT HAVE THESE PROBLEMS MADE IT FOR YOU TO DO YOUR WORK, TAKE CARE OF THINGS AT HOME, OR GET ALONG WITH OTHER PEOPLE: 1
SUM OF ALL RESPONSES TO PHQ QUESTIONS 1-9: 6
SUM OF ALL RESPONSES TO PHQ QUESTIONS 1-9: 6
4. FEELING TIRED OR HAVING LITTLE ENERGY: 3
7. TROUBLE CONCENTRATING ON THINGS, SUCH AS READING THE NEWSPAPER OR WATCHING TELEVISION: 0
3. TROUBLE FALLING OR STAYING ASLEEP: 3
1. LITTLE INTEREST OR PLEASURE IN DOING THINGS: 0
9. THOUGHTS THAT YOU WOULD BE BETTER OFF DEAD, OR OF HURTING YOURSELF: 0

## 2024-01-30 NOTE — TELEPHONE ENCOUNTER
Spoke to Emiliano at scheduling department for STAT CT Abd pelvis.  She scheduled for today at [facility], patient to arrive at 2 pm.  NPO 2 hours prior, so nothing after 12:30 pm today.    Patient in office and advised.

## 2024-01-30 NOTE — PROGRESS NOTES
abdominal pain. Negative for constipation, diarrhea, nausea and vomiting.   Genitourinary:  Negative for difficulty urinating, frequency and urgency.   Musculoskeletal:  Positive for neck pain. Negative for arthralgias, back pain and myalgias.   Neurological:  Negative for dizziness and headaches.   Psychiatric/Behavioral:  Negative for confusion, dysphoric mood and sleep disturbance. The patient is not nervous/anxious.    All other systems reviewed and are negative.      Objective:     Physical Exam  Vitals and nursing note reviewed.   Constitutional:       General: She is not in acute distress.     Appearance: Normal appearance.   HENT:      Head: Normocephalic.      Mouth/Throat:      Mouth: Mucous membranes are moist.      Pharynx: Posterior oropharyngeal erythema (mild) present. No oropharyngeal exudate.   Eyes:      Extraocular Movements: Extraocular movements intact.      Conjunctiva/sclera: Conjunctivae normal.      Pupils: Pupils are equal, round, and reactive to light.   Cardiovascular:      Rate and Rhythm: Normal rate and regular rhythm.      Pulses: Normal pulses.      Heart sounds: Normal heart sounds.   Pulmonary:      Effort: Pulmonary effort is normal.      Breath sounds: Normal breath sounds.   Abdominal:      General: Abdomen is flat. Bowel sounds are normal.      Palpations: Abdomen is soft.      Tenderness: There is abdominal tenderness in the right lower quadrant. There is guarding. There is no right CVA tenderness, left CVA tenderness or rebound.      Hernia: No hernia is present.      Comments: There is pain with range of motion of right hip and the right lower quadrant.  Pain with resistance to hip flexion.  No pain on the left.   Musculoskeletal:      Cervical back: Normal range of motion.      Right lower leg: No edema.      Left lower leg: No edema.   Lymphadenopathy:      Cervical: No cervical adenopathy.   Skin:     General: Skin is warm.      Capillary Refill: Capillary refill takes

## 2024-01-31 DIAGNOSIS — J02.9 SORE THROAT: Primary | ICD-10-CM

## 2024-01-31 RX ORDER — METHYLPREDNISOLONE 4 MG/1
TABLET ORAL
Qty: 1 KIT | Refills: 0 | Status: SHIPPED | OUTPATIENT
Start: 2024-01-31 | End: 2024-02-06

## 2024-02-07 ASSESSMENT — ENCOUNTER SYMPTOMS
DIARRHEA: 0
SHORTNESS OF BREATH: 0
SINUS PAIN: 0
RHINORRHEA: 0
CONSTIPATION: 0
VOMITING: 0
ABDOMINAL PAIN: 1
SORE THROAT: 1
COUGH: 0
BACK PAIN: 0
NAUSEA: 0

## 2024-03-05 ENCOUNTER — OFFICE VISIT (OUTPATIENT)
Dept: PRIMARY CARE CLINIC | Age: 39
End: 2024-03-05
Payer: COMMERCIAL

## 2024-03-05 VITALS
DIASTOLIC BLOOD PRESSURE: 82 MMHG | SYSTOLIC BLOOD PRESSURE: 120 MMHG | RESPIRATION RATE: 16 BRPM | HEART RATE: 94 BPM | WEIGHT: 156.6 LBS | OXYGEN SATURATION: 98 % | BODY MASS INDEX: 31.57 KG/M2 | HEIGHT: 59 IN

## 2024-03-05 DIAGNOSIS — F33.1 MODERATE EPISODE OF RECURRENT MAJOR DEPRESSIVE DISORDER (HCC): ICD-10-CM

## 2024-03-05 DIAGNOSIS — G43.109 MIGRAINE WITH AURA AND WITHOUT STATUS MIGRAINOSUS, NOT INTRACTABLE: ICD-10-CM

## 2024-03-05 DIAGNOSIS — E66.9 OBESITY (BMI 30.0-34.9): Primary | ICD-10-CM

## 2024-03-05 PROCEDURE — G8427 DOCREV CUR MEDS BY ELIG CLIN: HCPCS | Performed by: PHYSICIAN ASSISTANT

## 2024-03-05 PROCEDURE — G8484 FLU IMMUNIZE NO ADMIN: HCPCS | Performed by: PHYSICIAN ASSISTANT

## 2024-03-05 PROCEDURE — 1036F TOBACCO NON-USER: CPT | Performed by: PHYSICIAN ASSISTANT

## 2024-03-05 PROCEDURE — 99214 OFFICE O/P EST MOD 30 MIN: CPT | Performed by: PHYSICIAN ASSISTANT

## 2024-03-05 PROCEDURE — G8417 CALC BMI ABV UP PARAM F/U: HCPCS | Performed by: PHYSICIAN ASSISTANT

## 2024-03-05 RX ORDER — BUPROPION HYDROCHLORIDE 150 MG/1
150 TABLET ORAL EVERY MORNING
Qty: 90 TABLET | Refills: 1 | Status: SHIPPED | OUTPATIENT
Start: 2024-03-05

## 2024-03-05 RX ORDER — SEMAGLUTIDE 2.4 MG/.75ML
2.4 INJECTION, SOLUTION SUBCUTANEOUS
Qty: 3 ML | Refills: 2 | Status: SHIPPED | OUTPATIENT
Start: 2024-03-05

## 2024-03-05 SDOH — ECONOMIC STABILITY: FOOD INSECURITY: WITHIN THE PAST 12 MONTHS, THE FOOD YOU BOUGHT JUST DIDN'T LAST AND YOU DIDN'T HAVE MONEY TO GET MORE.: NEVER TRUE

## 2024-03-05 SDOH — ECONOMIC STABILITY: FOOD INSECURITY: WITHIN THE PAST 12 MONTHS, YOU WORRIED THAT YOUR FOOD WOULD RUN OUT BEFORE YOU GOT MONEY TO BUY MORE.: NEVER TRUE

## 2024-03-05 SDOH — ECONOMIC STABILITY: INCOME INSECURITY: HOW HARD IS IT FOR YOU TO PAY FOR THE VERY BASICS LIKE FOOD, HOUSING, MEDICAL CARE, AND HEATING?: NOT HARD AT ALL

## 2024-03-05 ASSESSMENT — ENCOUNTER SYMPTOMS
RHINORRHEA: 0
VOMITING: 0
SINUS PAIN: 0
NAUSEA: 0
BACK PAIN: 0
DIARRHEA: 0
SHORTNESS OF BREATH: 0
ABDOMINAL PAIN: 0
COUGH: 0
CONSTIPATION: 0

## 2024-03-05 ASSESSMENT — PATIENT HEALTH QUESTIONNAIRE - PHQ9
6. FEELING BAD ABOUT YOURSELF - OR THAT YOU ARE A FAILURE OR HAVE LET YOURSELF OR YOUR FAMILY DOWN: 0
SUM OF ALL RESPONSES TO PHQ QUESTIONS 1-9: 6
SUM OF ALL RESPONSES TO PHQ QUESTIONS 1-9: 6
7. TROUBLE CONCENTRATING ON THINGS, SUCH AS READING THE NEWSPAPER OR WATCHING TELEVISION: 0
8. MOVING OR SPEAKING SO SLOWLY THAT OTHER PEOPLE COULD HAVE NOTICED. OR THE OPPOSITE, BEING SO FIGETY OR RESTLESS THAT YOU HAVE BEEN MOVING AROUND A LOT MORE THAN USUAL: 0
2. FEELING DOWN, DEPRESSED OR HOPELESS: 0
SUM OF ALL RESPONSES TO PHQ QUESTIONS 1-9: 6
SUM OF ALL RESPONSES TO PHQ9 QUESTIONS 1 & 2: 0
9. THOUGHTS THAT YOU WOULD BE BETTER OFF DEAD, OR OF HURTING YOURSELF: 0
10. IF YOU CHECKED OFF ANY PROBLEMS, HOW DIFFICULT HAVE THESE PROBLEMS MADE IT FOR YOU TO DO YOUR WORK, TAKE CARE OF THINGS AT HOME, OR GET ALONG WITH OTHER PEOPLE: 1
1. LITTLE INTEREST OR PLEASURE IN DOING THINGS: 0
5. POOR APPETITE OR OVEREATING: 0
4. FEELING TIRED OR HAVING LITTLE ENERGY: 3
SUM OF ALL RESPONSES TO PHQ QUESTIONS 1-9: 6
3. TROUBLE FALLING OR STAYING ASLEEP: 3

## 2024-03-05 NOTE — PROGRESS NOTES
[facility]  [facility] PRIMARY CARE  [address] [place] [address] DR  [address]  [facility] [address]  Dept: [phone]  Dept Fax: [phone]    Kaylah Banks is a 38 y.o. female who presents today for her medical conditions/complaints as noted below.    Chief Complaint   Patient presents with    Weight Management     3 month follow up        HPI:     Patient presents the office for routine follow-up.  She has past medical history including obesity, GERD, migraine/headache, depression.    Patient is currently taking Wegovy for management of obesity.  Since last office visit she is down about 5 pounds.  Weight loss has slowed down a little bit since the beginning.  She is trying to increase physical activity within the weather turning nicer.  We discussed dietary modifications and recommendations.  She is trying to monitor for excess carbohydrates.    Patient reports her mood is very well.  She has interest in activities.  She is having good days several days per week.  Denies any issues with Wellbutrin.  She would like to continue management.    She is following closely with neurology.  Occasionally is having occipital/posterior headaches.  They are adjusting amitriptyline dose before pursuing any type of nerve block/injection.    Of note, patient reports her acute neck and abdominal pain that she was seen last visit resolved after a few days.  There is no significant acute finding on CT abdomen/pelvis.    No other concerns or complaints.  BP stable.        Hemoglobin A1C (%)   Date Value   11/22/2023 5.1   06/07/2022 5.8   01/18/2022 5.7             ( goal A1C is < 7)   No components found for: \"LABMICR\"  LDL Cholesterol (mg/dL)   Date Value   11/22/2023 98   01/18/2022 80   03/01/2017 116       (goal LDL is <100)   AST (U/L)   Date Value   11/22/2023 16     ALT (U/L)   Date Value   11/22/2023 8 (L)     BUN (mg/dL)   Date Value   11/22/2023 7     BP Readings from Last 3 Encounters:   03/05/24 120/82

## 2024-04-09 ENCOUNTER — TELEPHONE (OUTPATIENT)
Dept: NEUROLOGY | Age: 39
End: 2024-04-09

## 2024-04-09 ENCOUNTER — OFFICE VISIT (OUTPATIENT)
Dept: NEUROLOGY | Age: 39
End: 2024-04-09
Payer: COMMERCIAL

## 2024-04-09 VITALS
DIASTOLIC BLOOD PRESSURE: 76 MMHG | SYSTOLIC BLOOD PRESSURE: 115 MMHG | WEIGHT: 152 LBS | HEART RATE: 123 BPM | BODY MASS INDEX: 30.64 KG/M2 | HEIGHT: 59 IN

## 2024-04-09 DIAGNOSIS — M54.2 NECK PAIN: ICD-10-CM

## 2024-04-09 DIAGNOSIS — G43.109 MIGRAINE WITH AURA AND WITHOUT STATUS MIGRAINOSUS, NOT INTRACTABLE: ICD-10-CM

## 2024-04-09 DIAGNOSIS — M54.81 OCCIPITAL NEURALGIA, UNSPECIFIED LATERALITY: Primary | ICD-10-CM

## 2024-04-09 PROCEDURE — G8427 DOCREV CUR MEDS BY ELIG CLIN: HCPCS | Performed by: STUDENT IN AN ORGANIZED HEALTH CARE EDUCATION/TRAINING PROGRAM

## 2024-04-09 PROCEDURE — 99214 OFFICE O/P EST MOD 30 MIN: CPT | Performed by: STUDENT IN AN ORGANIZED HEALTH CARE EDUCATION/TRAINING PROGRAM

## 2024-04-09 PROCEDURE — G2211 COMPLEX E/M VISIT ADD ON: HCPCS | Performed by: STUDENT IN AN ORGANIZED HEALTH CARE EDUCATION/TRAINING PROGRAM

## 2024-04-09 PROCEDURE — 1036F TOBACCO NON-USER: CPT | Performed by: STUDENT IN AN ORGANIZED HEALTH CARE EDUCATION/TRAINING PROGRAM

## 2024-04-09 PROCEDURE — G8417 CALC BMI ABV UP PARAM F/U: HCPCS | Performed by: STUDENT IN AN ORGANIZED HEALTH CARE EDUCATION/TRAINING PROGRAM

## 2024-04-09 RX ORDER — AMITRIPTYLINE HYDROCHLORIDE 75 MG/1
75 TABLET ORAL NIGHTLY
Qty: 30 TABLET | Refills: 2 | Status: SHIPPED | OUTPATIENT
Start: 2024-04-09 | End: 2025-04-03

## 2024-04-09 RX ORDER — RELUGOLIX, ESTRADIOL HEMIHYDRATE, AND NORETHINDRONE ACETATE 40; 1; .5 MG/1; MG/1; MG/1
TABLET, FILM COATED ORAL
COMMUNITY
Start: 2024-04-02

## 2024-04-09 NOTE — PROGRESS NOTES
[facility] NEUROLOGY SPECIALIST  [address]  [facility] [address]  Dept: [phone]    PATIENT NAME: Kaylah Banks  PATIENT MRN: [id number]  PRIMARY CARE PHYSICIAN: Bruno Fleming PA-C    HPI:      Kaylah Banks is a 38 y.o. female who presents to clinic today for follow up of Migraine      Interim history:  For headache prophylaxis, she is taking elavil 75 mg daily.She notes her occipital headaches have worsened recently. She notes she is getting daily occipital headaches daily. She is a  so she has switched using a handset to headset. For abortive therapy, she is taking Zomig and notes it is effective for migraines but not occipital headaches.      She notes paresthesias have improved.  Currently not on any medications for paresthesias. Gabapentin was weaned off due to weight gain.      Prior History:  Patient was last seen in 8/31/22. Topamax was weaned off at last visit. She is currently on elavil on 25 mg daily for migraine prophylaxis and rizatriptan for abortive therapy. She notes migraines are well controlled. Rizatriptan is not effective when she does have a migraine. She has had about two migraines since last visit. Denies any side effects of elavil. She had an EMG of BLE which was normal. Gabapentin 100 mg TID was also started for paresthesias. She notes since starting she has gained weight. She was already gaining weight prior to gabapentin but has gained more while on gabapentin. She has decreased gabapentin to 100 mg BID. She is using noom for the past 6 weeks and has lost only 2 pounds. She is following the with her gynecologist  for weight loss options and assess if it a hormonal issue.     She notes her paresthesias have improved by 50 percent since starting gabapentin. She notes it occurs about 2-3 times a week.     Prior History:  At last clinic visit, we had started to wean off of Topamax given kidney stones and started amitriptyline 25 mg for migraine prophylaxis

## 2024-06-18 ENCOUNTER — OFFICE VISIT (OUTPATIENT)
Dept: PRIMARY CARE CLINIC | Age: 39
End: 2024-06-18
Payer: COMMERCIAL

## 2024-06-18 VITALS
HEIGHT: 59 IN | DIASTOLIC BLOOD PRESSURE: 78 MMHG | SYSTOLIC BLOOD PRESSURE: 120 MMHG | WEIGHT: 156.4 LBS | BODY MASS INDEX: 31.53 KG/M2 | OXYGEN SATURATION: 99 % | HEART RATE: 88 BPM | RESPIRATION RATE: 16 BRPM

## 2024-06-18 DIAGNOSIS — F33.1 MODERATE EPISODE OF RECURRENT MAJOR DEPRESSIVE DISORDER (HCC): ICD-10-CM

## 2024-06-18 DIAGNOSIS — E66.9 OBESITY (BMI 30.0-34.9): Primary | ICD-10-CM

## 2024-06-18 DIAGNOSIS — G43.109 MIGRAINE WITH AURA AND WITHOUT STATUS MIGRAINOSUS, NOT INTRACTABLE: ICD-10-CM

## 2024-06-18 PROBLEM — Z01.419 GYNECOLOGIC EXAM NORMAL: Status: ACTIVE | Noted: 2024-03-06

## 2024-06-18 PROBLEM — R10.2 PELVIC PAIN: Status: ACTIVE | Noted: 2024-06-18

## 2024-06-18 PROCEDURE — G8427 DOCREV CUR MEDS BY ELIG CLIN: HCPCS | Performed by: PHYSICIAN ASSISTANT

## 2024-06-18 PROCEDURE — G8417 CALC BMI ABV UP PARAM F/U: HCPCS | Performed by: PHYSICIAN ASSISTANT

## 2024-06-18 PROCEDURE — 1036F TOBACCO NON-USER: CPT | Performed by: PHYSICIAN ASSISTANT

## 2024-06-18 PROCEDURE — 99214 OFFICE O/P EST MOD 30 MIN: CPT | Performed by: PHYSICIAN ASSISTANT

## 2024-06-18 SDOH — ECONOMIC STABILITY: INCOME INSECURITY: HOW HARD IS IT FOR YOU TO PAY FOR THE VERY BASICS LIKE FOOD, HOUSING, MEDICAL CARE, AND HEATING?: NOT HARD AT ALL

## 2024-06-18 SDOH — ECONOMIC STABILITY: FOOD INSECURITY: WITHIN THE PAST 12 MONTHS, YOU WORRIED THAT YOUR FOOD WOULD RUN OUT BEFORE YOU GOT MONEY TO BUY MORE.: NEVER TRUE

## 2024-06-18 SDOH — ECONOMIC STABILITY: FOOD INSECURITY: WITHIN THE PAST 12 MONTHS, THE FOOD YOU BOUGHT JUST DIDN'T LAST AND YOU DIDN'T HAVE MONEY TO GET MORE.: NEVER TRUE

## 2024-06-18 ASSESSMENT — ENCOUNTER SYMPTOMS
NAUSEA: 0
VOMITING: 0
SHORTNESS OF BREATH: 0
BACK PAIN: 0
DIARRHEA: 0
COUGH: 0
RHINORRHEA: 0
ABDOMINAL PAIN: 0
CONSTIPATION: 0
SINUS PAIN: 0

## 2024-06-18 ASSESSMENT — PATIENT HEALTH QUESTIONNAIRE - PHQ9
SUM OF ALL RESPONSES TO PHQ QUESTIONS 1-9: 0
3. TROUBLE FALLING OR STAYING ASLEEP: NOT AT ALL
2. FEELING DOWN, DEPRESSED OR HOPELESS: NOT AT ALL
SUM OF ALL RESPONSES TO PHQ QUESTIONS 1-9: 0
4. FEELING TIRED OR HAVING LITTLE ENERGY: NOT AT ALL
5. POOR APPETITE OR OVEREATING: NOT AT ALL
SUM OF ALL RESPONSES TO PHQ QUESTIONS 1-9: 0
SUM OF ALL RESPONSES TO PHQ QUESTIONS 1-9: 0
6. FEELING BAD ABOUT YOURSELF - OR THAT YOU ARE A FAILURE OR HAVE LET YOURSELF OR YOUR FAMILY DOWN: NOT AT ALL
8. MOVING OR SPEAKING SO SLOWLY THAT OTHER PEOPLE COULD HAVE NOTICED. OR THE OPPOSITE, BEING SO FIGETY OR RESTLESS THAT YOU HAVE BEEN MOVING AROUND A LOT MORE THAN USUAL: NOT AT ALL
10. IF YOU CHECKED OFF ANY PROBLEMS, HOW DIFFICULT HAVE THESE PROBLEMS MADE IT FOR YOU TO DO YOUR WORK, TAKE CARE OF THINGS AT HOME, OR GET ALONG WITH OTHER PEOPLE: NOT DIFFICULT AT ALL
1. LITTLE INTEREST OR PLEASURE IN DOING THINGS: NOT AT ALL
9. THOUGHTS THAT YOU WOULD BE BETTER OFF DEAD, OR OF HURTING YOURSELF: NOT AT ALL
SUM OF ALL RESPONSES TO PHQ9 QUESTIONS 1 & 2: 0
7. TROUBLE CONCENTRATING ON THINGS, SUCH AS READING THE NEWSPAPER OR WATCHING TELEVISION: NOT AT ALL

## 2024-06-18 NOTE — PROGRESS NOTES
[facility]  [facility] PRIMARY CARE  [address] [place] [address] [address]  [address]  [facility] [address]  Dept: [phone]  Dept Fax: [phone]    Kaylah Banks is a 39 y.o. female who presents today for her medical conditions/complaints as noted below.    Chief Complaint   Patient presents with    Weight Management     3 month follow up        HPI:     Patient presents the office for routine follow-up.  She has past medical history including GERD, obesity, depression, headache/migraine.    Patient has been taking Wegovy 2.4 mg weekly for management of obesity.  These last 3 months her weight has been stable.  No significant change.  Overall she has lost over 30 pounds since starting medication.  She reports she is doing very well.  She denies any side effect to medication.  Her goal is to get to out of obesity range may be around 145 pounds.  She is not having any GI issues.  No issues with hair loss.  She feels she is doing very well on the medicine.  She has not made any significant changes to her lifestyle but since last office visit.    Patient does follow regularly with neurology for management of headache and migraine.  She is taking amitriptyline nightly and using Zomig as needed.    GERD is stable on pantoprazole.    Depression is very well-controlled.  PHQ-9 is 0.  She is doing well on Wellbutrin.  She does not want to discontinue.    Of note, she is following with her gynecologist.  There is possibility she may be getting hysterectomy.    BP stable.        Hemoglobin A1C (%)   Date Value   11/22/2023 5.1   06/07/2022 5.8   01/18/2022 5.7             ( goal A1C is < 7)   No components found for: \"LABMICR\"  No components found for: \"LDLCHOLESTEROL\", \"LDLCALC\"    (goal LDL is <100)   AST (U/L)   Date Value   11/22/2023 16     ALT (U/L)   Date Value   11/22/2023 8 (L)     BUN (mg/dL)   Date Value   11/22/2023 7     BP Readings from Last 3 Encounters:   06/18/24 120/78   04/09/24 115/76   03/05/24

## 2024-06-26 DIAGNOSIS — K21.9 GASTROESOPHAGEAL REFLUX DISEASE WITHOUT ESOPHAGITIS: Chronic | ICD-10-CM

## 2024-06-27 RX ORDER — PANTOPRAZOLE SODIUM 40 MG/1
40 TABLET, DELAYED RELEASE ORAL DAILY
Qty: 90 TABLET | Refills: 1 | Status: SHIPPED | OUTPATIENT
Start: 2024-06-27 | End: 2025-06-27

## 2024-07-24 RX ORDER — AMITRIPTYLINE HYDROCHLORIDE 75 MG/1
75 TABLET ORAL NIGHTLY
Qty: 30 TABLET | Refills: 5 | Status: SHIPPED | OUTPATIENT
Start: 2024-07-24 | End: 2025-01-20

## 2024-07-24 NOTE — TELEPHONE ENCOUNTER
Previous Dr. Martins patient;     Pharmacy requesting refill of amitriptyline 75mg.      Medication active on med list yes      Date of last Rx: 4/9/2024 with 2 refills          verified by REGI RAY      Date of last appointment 4/9/2024    Next Visit Date:  Visit date not found

## 2024-08-07 DIAGNOSIS — E66.9 OBESITY (BMI 30.0-34.9): ICD-10-CM

## 2024-08-07 RX ORDER — SEMAGLUTIDE 2.4 MG/.75ML
2.4 INJECTION, SOLUTION SUBCUTANEOUS
Qty: 3 ML | Refills: 2 | Status: SHIPPED | OUTPATIENT
Start: 2024-08-07

## 2024-09-26 ENCOUNTER — HOSPITAL ENCOUNTER (OUTPATIENT)
Age: 39
Setting detail: SPECIMEN
Discharge: HOME | End: 2024-09-26

## 2024-09-26 ENCOUNTER — OFFICE VISIT (OUTPATIENT)
Dept: PRIMARY CARE CLINIC | Age: 39
End: 2024-09-26
Payer: COMMERCIAL

## 2024-09-26 VITALS
HEIGHT: 59 IN | DIASTOLIC BLOOD PRESSURE: 82 MMHG | OXYGEN SATURATION: 100 % | WEIGHT: 163.4 LBS | BODY MASS INDEX: 32.94 KG/M2 | HEART RATE: 92 BPM | RESPIRATION RATE: 16 BRPM | SYSTOLIC BLOOD PRESSURE: 118 MMHG

## 2024-09-26 DIAGNOSIS — F33.1 MODERATE EPISODE OF RECURRENT MAJOR DEPRESSIVE DISORDER (HCC): ICD-10-CM

## 2024-09-26 DIAGNOSIS — R82.90 ABNORMAL URINE ODOR: ICD-10-CM

## 2024-09-26 DIAGNOSIS — R82.90 ABNORMAL URINE ODOR: Primary | ICD-10-CM

## 2024-09-26 DIAGNOSIS — E66.9 OBESITY (BMI 30.0-34.9): ICD-10-CM

## 2024-09-26 PROBLEM — N80.9 ENDOMETRIOSIS: Status: ACTIVE | Noted: 2024-06-21

## 2024-09-26 LAB
BILIRUBIN, POC: NORMAL
BLOOD URINE, POC: NORMAL
CLARITY, POC: NORMAL
COLOR, POC: NORMAL
GLUCOSE URINE, POC: NORMAL MG/DL
KETONES, POC: NORMAL MG/DL
LEUKOCYTE EST, POC: NORMAL
NITRITE, POC: NORMAL
PH, POC: 7
PROTEIN, POC: NORMAL MG/DL
SPECIFIC GRAVITY, POC: 1.01
UROBILINOGEN, POC: 0.2 MG/DL

## 2024-09-26 PROCEDURE — G8417 CALC BMI ABV UP PARAM F/U: HCPCS | Performed by: PHYSICIAN ASSISTANT

## 2024-09-26 PROCEDURE — G8427 DOCREV CUR MEDS BY ELIG CLIN: HCPCS | Performed by: PHYSICIAN ASSISTANT

## 2024-09-26 PROCEDURE — 81002 URINALYSIS NONAUTO W/O SCOPE: CPT | Performed by: PHYSICIAN ASSISTANT

## 2024-09-26 PROCEDURE — 99214 OFFICE O/P EST MOD 30 MIN: CPT | Performed by: PHYSICIAN ASSISTANT

## 2024-09-26 PROCEDURE — 1036F TOBACCO NON-USER: CPT | Performed by: PHYSICIAN ASSISTANT

## 2024-09-26 SDOH — ECONOMIC STABILITY: FOOD INSECURITY: WITHIN THE PAST 12 MONTHS, YOU WORRIED THAT YOUR FOOD WOULD RUN OUT BEFORE YOU GOT MONEY TO BUY MORE.: NEVER TRUE

## 2024-09-26 SDOH — ECONOMIC STABILITY: INCOME INSECURITY: HOW HARD IS IT FOR YOU TO PAY FOR THE VERY BASICS LIKE FOOD, HOUSING, MEDICAL CARE, AND HEATING?: NOT HARD AT ALL

## 2024-09-26 SDOH — ECONOMIC STABILITY: FOOD INSECURITY: WITHIN THE PAST 12 MONTHS, THE FOOD YOU BOUGHT JUST DIDN'T LAST AND YOU DIDN'T HAVE MONEY TO GET MORE.: NEVER TRUE

## 2024-09-26 ASSESSMENT — ENCOUNTER SYMPTOMS
COUGH: 0
NAUSEA: 0
BACK PAIN: 0
CONSTIPATION: 0
SHORTNESS OF BREATH: 0
SINUS PAIN: 0
VOMITING: 0
DIARRHEA: 0
RHINORRHEA: 0
ABDOMINAL PAIN: 0

## 2024-09-26 ASSESSMENT — PATIENT HEALTH QUESTIONNAIRE - PHQ9
5. POOR APPETITE OR OVEREATING: NOT AT ALL
2. FEELING DOWN, DEPRESSED OR HOPELESS: NOT AT ALL
10. IF YOU CHECKED OFF ANY PROBLEMS, HOW DIFFICULT HAVE THESE PROBLEMS MADE IT FOR YOU TO DO YOUR WORK, TAKE CARE OF THINGS AT HOME, OR GET ALONG WITH OTHER PEOPLE: SOMEWHAT DIFFICULT
8. MOVING OR SPEAKING SO SLOWLY THAT OTHER PEOPLE COULD HAVE NOTICED. OR THE OPPOSITE, BEING SO FIGETY OR RESTLESS THAT YOU HAVE BEEN MOVING AROUND A LOT MORE THAN USUAL: NOT AT ALL
9. THOUGHTS THAT YOU WOULD BE BETTER OFF DEAD, OR OF HURTING YOURSELF: NOT AT ALL
3. TROUBLE FALLING OR STAYING ASLEEP: NEARLY EVERY DAY
1. LITTLE INTEREST OR PLEASURE IN DOING THINGS: NOT AT ALL
SUM OF ALL RESPONSES TO PHQ9 QUESTIONS 1 & 2: 0
6. FEELING BAD ABOUT YOURSELF - OR THAT YOU ARE A FAILURE OR HAVE LET YOURSELF OR YOUR FAMILY DOWN: NOT AT ALL
SUM OF ALL RESPONSES TO PHQ QUESTIONS 1-9: 6
4. FEELING TIRED OR HAVING LITTLE ENERGY: NEARLY EVERY DAY
SUM OF ALL RESPONSES TO PHQ QUESTIONS 1-9: 6
7. TROUBLE CONCENTRATING ON THINGS, SUCH AS READING THE NEWSPAPER OR WATCHING TELEVISION: NOT AT ALL

## 2024-09-29 LAB
MICROORGANISM SPEC CULT: ABNORMAL
SERVICE CMNT-IMP: ABNORMAL
SPECIMEN DESCRIPTION: ABNORMAL

## 2024-09-30 DIAGNOSIS — N39.0 ACUTE UTI: Primary | ICD-10-CM

## 2024-09-30 RX ORDER — NITROFURANTOIN 25; 75 MG/1; MG/1
100 CAPSULE ORAL 2 TIMES DAILY
Qty: 10 CAPSULE | Refills: 0 | Status: SHIPPED | OUTPATIENT
Start: 2024-09-30 | End: 2024-10-05

## 2024-12-20 ENCOUNTER — E-VISIT (OUTPATIENT)
Dept: PRIMARY CARE CLINIC | Age: 39
End: 2024-12-20
Payer: COMMERCIAL

## 2024-12-20 DIAGNOSIS — T30.0 BURN: Primary | ICD-10-CM

## 2024-12-20 PROCEDURE — 99422 OL DIG E/M SVC 11-20 MIN: CPT | Performed by: NURSE PRACTITIONER

## 2024-12-20 NOTE — PROGRESS NOTES
Reviewed questionnaire and photo    Reviewed meds/allergies    Dx Burn    Plan Continue current measures, message for any redness, warmth or swelling    Time spent on visit 11 min

## 2025-01-05 DIAGNOSIS — K21.9 GASTROESOPHAGEAL REFLUX DISEASE WITHOUT ESOPHAGITIS: Chronic | ICD-10-CM

## 2025-01-07 RX ORDER — PANTOPRAZOLE SODIUM 40 MG/1
40 TABLET, DELAYED RELEASE ORAL DAILY
Qty: 90 TABLET | Refills: 1 | Status: SHIPPED | OUTPATIENT
Start: 2025-01-07 | End: 2026-01-07

## 2025-01-14 NOTE — TELEPHONE ENCOUNTER
Last visit: 1/2/20  Last Med refill: 12/23/19  Does patient have enough medication for 72 hours: No:     Next Visit Date:  No future appointments.     Health Maintenance   Topic Date Due    Flu vaccine (1) 09/01/2020    Cervical cancer screen  07/22/2022    DTaP/Tdap/Td vaccine (2 - Td) 07/09/2025    HIV screen  Completed    Hepatitis A vaccine  Aged Out    Hepatitis B vaccine  Aged Out    Hib vaccine  Aged Out    Meningococcal (ACWY) vaccine  Aged Out    Pneumococcal 0-64 years Vaccine  Aged Out    Varicella vaccine  Discontinued       Hemoglobin A1C (%)   Date Value   01/03/2020 5.3   10/26/2012 4.6             ( goal A1C is < 7)   No results found for: LABMICR  LDL Cholesterol (mg/dL)   Date Value   03/01/2017 116   07/14/2015 130       (goal LDL is <100)   AST (U/L)   Date Value   01/03/2020 10     ALT (U/L)   Date Value   01/03/2020 6     BUN (mg/dL)   Date Value   01/03/2020 9     BP Readings from Last 3 Encounters:   01/02/20 114/66   12/04/19 110/74   04/29/19 118/76          (goal 120/80)    All Future Testing planned in [facility]  Lab Frequency Next Occurrence   US BREAST COMPLETE RIGHT Once 12/04/2019   US BREAST COMPLETE LEFT Once 12/04/2019               Patient Active Problem List:     GERD (gastroesophageal reflux disease)     MTHFR (methylene THF reductase) deficiency and homocystinuria (HCC)     Migraine with aura and without status migrainosus, not intractable     Herpes simplex
no

## 2025-03-18 RX ORDER — AMITRIPTYLINE HYDROCHLORIDE 75 MG/1
75 TABLET ORAL NIGHTLY
Qty: 30 TABLET | Refills: 2 | Status: SHIPPED | OUTPATIENT
Start: 2025-03-18 | End: 2025-06-16

## 2025-03-18 NOTE — TELEPHONE ENCOUNTER
patient requesting refill of Elavil.      Medication active on med list yes      Date of last fill: 7/24/24  verified on 3/18/2025   verified by SF LPN      Date of last appointment 4/9/24 ()    Next Visit Date:  Visit date not found, pt needs an appt.

## 2025-03-21 ENCOUNTER — OFFICE VISIT (OUTPATIENT)
Dept: PRIMARY CARE CLINIC | Age: 40
End: 2025-03-21
Payer: COMMERCIAL

## 2025-03-21 ENCOUNTER — HOSPITAL ENCOUNTER (OUTPATIENT)
Age: 40
Setting detail: SPECIMEN
Discharge: HOME | End: 2025-03-21

## 2025-03-21 VITALS
BODY MASS INDEX: 39.96 KG/M2 | SYSTOLIC BLOOD PRESSURE: 110 MMHG | WEIGHT: 198.2 LBS | HEART RATE: 93 BPM | HEIGHT: 59 IN | OXYGEN SATURATION: 98 % | DIASTOLIC BLOOD PRESSURE: 74 MMHG

## 2025-03-21 DIAGNOSIS — D64.9 ANEMIA, UNSPECIFIED TYPE: ICD-10-CM

## 2025-03-21 DIAGNOSIS — G56.03 BILATERAL CARPAL TUNNEL SYNDROME: Primary | ICD-10-CM

## 2025-03-21 LAB
ERYTHROCYTE [DISTWIDTH] IN BLOOD BY AUTOMATED COUNT: 12.6 % (ref 11.8–14.4)
FERRITIN SERPL-MCNC: 14 NG/ML (ref 15–150)
FOLATE SERPL-MCNC: 9.2 NG/ML (ref 4.8–24.2)
HCT VFR BLD AUTO: 34.4 % (ref 36.3–47.1)
HGB BLD-MCNC: 11 G/DL (ref 11.9–15.1)
IRON SATN MFR SERPL: 8 % (ref 20–55)
IRON SERPL-MCNC: 31 UG/DL (ref 37–145)
MCH RBC QN AUTO: 25.6 PG (ref 25.2–33.5)
MCHC RBC AUTO-ENTMCNC: 32 G/DL (ref 28.4–34.8)
MCV RBC AUTO: 80.2 FL (ref 82.6–102.9)
NRBC BLD-RTO: 0 PER 100 WBC
PLATELET # BLD AUTO: 301 K/UL (ref 138–453)
PMV BLD AUTO: 9.2 FL (ref 8.1–13.5)
RBC # BLD AUTO: 4.29 M/UL (ref 3.95–5.11)
TIBC SERPL-MCNC: 389 UG/DL (ref 250–450)
UNSATURATED IRON BINDING CAPACITY: 358 UG/DL (ref 112–347)
VIT B12 SERPL-MCNC: 372 PG/ML (ref 232–1245)
WBC OTHER # BLD: 6.2 K/UL (ref 3.5–11.3)

## 2025-03-21 PROCEDURE — 99214 OFFICE O/P EST MOD 30 MIN: CPT | Performed by: PHYSICIAN ASSISTANT

## 2025-03-21 PROCEDURE — G8417 CALC BMI ABV UP PARAM F/U: HCPCS | Performed by: PHYSICIAN ASSISTANT

## 2025-03-21 PROCEDURE — 1036F TOBACCO NON-USER: CPT | Performed by: PHYSICIAN ASSISTANT

## 2025-03-21 PROCEDURE — G8427 DOCREV CUR MEDS BY ELIG CLIN: HCPCS | Performed by: PHYSICIAN ASSISTANT

## 2025-03-21 RX ORDER — ESTRADIOL 0.07 MG/D
1 PATCH TRANSDERMAL WEEKLY
COMMUNITY
Start: 2025-02-04

## 2025-03-21 RX ORDER — ALBUTEROL SULFATE 90 UG/1
2 INHALANT RESPIRATORY (INHALATION) EVERY 6 HOURS PRN
COMMUNITY

## 2025-03-21 SDOH — ECONOMIC STABILITY: FOOD INSECURITY: WITHIN THE PAST 12 MONTHS, YOU WORRIED THAT YOUR FOOD WOULD RUN OUT BEFORE YOU GOT MONEY TO BUY MORE.: NEVER TRUE

## 2025-03-21 SDOH — ECONOMIC STABILITY: FOOD INSECURITY: WITHIN THE PAST 12 MONTHS, THE FOOD YOU BOUGHT JUST DIDN'T LAST AND YOU DIDN'T HAVE MONEY TO GET MORE.: NEVER TRUE

## 2025-03-21 ASSESSMENT — ENCOUNTER SYMPTOMS
SINUS PAIN: 0
COUGH: 0
SHORTNESS OF BREATH: 0
VOMITING: 0
CONSTIPATION: 0
RHINORRHEA: 0
DIARRHEA: 0
BACK PAIN: 0
ABDOMINAL PAIN: 0
NAUSEA: 0

## 2025-03-21 ASSESSMENT — PATIENT HEALTH QUESTIONNAIRE - PHQ9
10. IF YOU CHECKED OFF ANY PROBLEMS, HOW DIFFICULT HAVE THESE PROBLEMS MADE IT FOR YOU TO DO YOUR WORK, TAKE CARE OF THINGS AT HOME, OR GET ALONG WITH OTHER PEOPLE: NOT DIFFICULT AT ALL
SUM OF ALL RESPONSES TO PHQ QUESTIONS 1-9: 1
7. TROUBLE CONCENTRATING ON THINGS, SUCH AS READING THE NEWSPAPER OR WATCHING TELEVISION: NOT AT ALL
1. LITTLE INTEREST OR PLEASURE IN DOING THINGS: NOT AT ALL
4. FEELING TIRED OR HAVING LITTLE ENERGY: NOT AT ALL
5. POOR APPETITE OR OVEREATING: SEVERAL DAYS
3. TROUBLE FALLING OR STAYING ASLEEP: NOT AT ALL
SUM OF ALL RESPONSES TO PHQ QUESTIONS 1-9: 1
8. MOVING OR SPEAKING SO SLOWLY THAT OTHER PEOPLE COULD HAVE NOTICED. OR THE OPPOSITE, BEING SO FIGETY OR RESTLESS THAT YOU HAVE BEEN MOVING AROUND A LOT MORE THAN USUAL: NOT AT ALL
6. FEELING BAD ABOUT YOURSELF - OR THAT YOU ARE A FAILURE OR HAVE LET YOURSELF OR YOUR FAMILY DOWN: NOT AT ALL
SUM OF ALL RESPONSES TO PHQ QUESTIONS 1-9: 1
SUM OF ALL RESPONSES TO PHQ QUESTIONS 1-9: 1
9. THOUGHTS THAT YOU WOULD BE BETTER OFF DEAD, OR OF HURTING YOURSELF: NOT AT ALL
2. FEELING DOWN, DEPRESSED OR HOPELESS: NOT AT ALL

## 2025-03-21 NOTE — PROGRESS NOTES
2023 16     ALT (U/L)   Date Value   2023 8 (L)     BUN (mg/dL)   Date Value   2023 7     BP Readings from Last 3 Encounters:   25 110/74   24 118/82   24 120/78          (goal 120/80)    Past Medical History:   Diagnosis Date    Abnormal brain MRI     Anxiety     Asthma     Bicornuate uterus     Chronic back pain     Depression     Diverticulosis     GERD (gastroesophageal reflux disease)     Headache     Hearing loss     Hx of migraine headaches     Migraine     I have had them since i was young    Miscarriage     6 total    Obesity     Wears glasses       Past Surgical History:   Procedure Laterality Date    BREAST REDUCTION SURGERY  10/2016     SECTION  09    COLONOSCOPY  2004    I have had 3    HYSTERECTOMY, TOTAL ABDOMINAL (CERVIX REMOVED)  25    TONSILLECTOMY  1987    TUBAL LIGATION  10/2015       Family History   Problem Relation Age of Onset    Depression Mother     Anxiety Disorder Mother     Elevated Lipids Mother      Problems Mother     Kidney Disease Mother         CKD    Migraines Mother     Asthma Mother     Obesity Mother     Migraines Father     Learning Disabilities Father     Diabetes Maternal Grandmother     Cancer Maternal Grandmother     Migraines Brother     Asthma Brother     Learning Disabilities Brother     Diabetes Maternal Grandfather        Social History     Tobacco Use    Smoking status: Never    Smokeless tobacco: Never   Substance Use Topics    Alcohol use: Not Currently     Comment: Socially but only a few even then      Current Outpatient Medications   Medication Sig Dispense Refill    albuterol sulfate HFA (PROVENTIL;VENTOLIN;PROAIR) 108 (90 Base) MCG/ACT inhaler Inhale 2 puffs into the lungs every 6 hours as needed      docusate sodium (COLACE) 50 MG capsule Take 1 capsule by mouth 2 times daily      estradiol (CLIMARA) 0.075 MG/24HR Place 1 patch onto the skin once a week      amitriptyline (ELAVIL) 75 MG tablet

## 2025-03-24 ENCOUNTER — RESULTS FOLLOW-UP (OUTPATIENT)
Dept: PRIMARY CARE CLINIC | Age: 40
End: 2025-03-24

## 2025-03-24 DIAGNOSIS — D64.9 ANEMIA, UNSPECIFIED TYPE: Primary | ICD-10-CM

## 2025-03-25 RX ORDER — FERROUS SULFATE 325(65) MG
325 TABLET ORAL EVERY OTHER DAY
Qty: 45 TABLET | Refills: 1 | Status: SHIPPED | OUTPATIENT
Start: 2025-03-25

## 2025-04-03 ENCOUNTER — OFFICE VISIT (OUTPATIENT)
Dept: PRIMARY CARE CLINIC | Age: 40
End: 2025-04-03
Payer: COMMERCIAL

## 2025-04-03 VITALS
OXYGEN SATURATION: 99 % | HEIGHT: 59 IN | DIASTOLIC BLOOD PRESSURE: 80 MMHG | BODY MASS INDEX: 39.72 KG/M2 | RESPIRATION RATE: 16 BRPM | WEIGHT: 197 LBS | SYSTOLIC BLOOD PRESSURE: 122 MMHG | HEART RATE: 97 BPM

## 2025-04-03 DIAGNOSIS — E66.9 OBESITY (BMI 30-39.9): Primary | ICD-10-CM

## 2025-04-03 PROCEDURE — G8417 CALC BMI ABV UP PARAM F/U: HCPCS | Performed by: PHYSICIAN ASSISTANT

## 2025-04-03 PROCEDURE — 99214 OFFICE O/P EST MOD 30 MIN: CPT | Performed by: PHYSICIAN ASSISTANT

## 2025-04-03 PROCEDURE — G8427 DOCREV CUR MEDS BY ELIG CLIN: HCPCS | Performed by: PHYSICIAN ASSISTANT

## 2025-04-03 PROCEDURE — 1036F TOBACCO NON-USER: CPT | Performed by: PHYSICIAN ASSISTANT

## 2025-04-03 RX ORDER — METOPROLOL SUCCINATE 25 MG/1
25 TABLET, EXTENDED RELEASE ORAL DAILY
COMMUNITY
Start: 2025-03-13

## 2025-04-03 SDOH — ECONOMIC STABILITY: FOOD INSECURITY: WITHIN THE PAST 12 MONTHS, THE FOOD YOU BOUGHT JUST DIDN'T LAST AND YOU DIDN'T HAVE MONEY TO GET MORE.: NEVER TRUE

## 2025-04-03 SDOH — ECONOMIC STABILITY: FOOD INSECURITY: WITHIN THE PAST 12 MONTHS, YOU WORRIED THAT YOUR FOOD WOULD RUN OUT BEFORE YOU GOT MONEY TO BUY MORE.: NEVER TRUE

## 2025-04-03 ASSESSMENT — PATIENT HEALTH QUESTIONNAIRE - PHQ9
1. LITTLE INTEREST OR PLEASURE IN DOING THINGS: NOT AT ALL
5. POOR APPETITE OR OVEREATING: NOT AT ALL
8. MOVING OR SPEAKING SO SLOWLY THAT OTHER PEOPLE COULD HAVE NOTICED. OR THE OPPOSITE, BEING SO FIGETY OR RESTLESS THAT YOU HAVE BEEN MOVING AROUND A LOT MORE THAN USUAL: NOT AT ALL
SUM OF ALL RESPONSES TO PHQ QUESTIONS 1-9: 0
SUM OF ALL RESPONSES TO PHQ QUESTIONS 1-9: 0
9. THOUGHTS THAT YOU WOULD BE BETTER OFF DEAD, OR OF HURTING YOURSELF: NOT AT ALL
6. FEELING BAD ABOUT YOURSELF - OR THAT YOU ARE A FAILURE OR HAVE LET YOURSELF OR YOUR FAMILY DOWN: NOT AT ALL
2. FEELING DOWN, DEPRESSED OR HOPELESS: NOT AT ALL
10. IF YOU CHECKED OFF ANY PROBLEMS, HOW DIFFICULT HAVE THESE PROBLEMS MADE IT FOR YOU TO DO YOUR WORK, TAKE CARE OF THINGS AT HOME, OR GET ALONG WITH OTHER PEOPLE: NOT DIFFICULT AT ALL
4. FEELING TIRED OR HAVING LITTLE ENERGY: NOT AT ALL
SUM OF ALL RESPONSES TO PHQ QUESTIONS 1-9: 0
7. TROUBLE CONCENTRATING ON THINGS, SUCH AS READING THE NEWSPAPER OR WATCHING TELEVISION: NOT AT ALL
3. TROUBLE FALLING OR STAYING ASLEEP: NOT AT ALL
SUM OF ALL RESPONSES TO PHQ QUESTIONS 1-9: 0

## 2025-04-03 ASSESSMENT — ENCOUNTER SYMPTOMS
VOMITING: 0
BACK PAIN: 0
SHORTNESS OF BREATH: 0
ABDOMINAL PAIN: 0
DIARRHEA: 0
COUGH: 0
NAUSEA: 0
CONSTIPATION: 0
SINUS PAIN: 0
RHINORRHEA: 0

## 2025-04-03 NOTE — PROGRESS NOTES
[facility]  [facility] PRIMARY CARE  [address] [place] [address] DR  [address]  [facility] [address]  Dept: [phone]  Dept Fax: [phone]    Kaylah Banks is a 39 y.o. female who presents today for her medical conditions/complaints as noted below.    Chief Complaint   Patient presents with    Weight Management     Discuss restarting Wegovy       HPI:     Patient presents to the office to discuss weight management and medication.  She would like to discuss obesity and weight loss drugs.  In the past was on Wegovy, but stopped last November due to surgery/hysterectomy.  She has never restarted medication.  Since then she has gradually gained weight.  She has put on about 30 to 40 pounds in the last 6 months.  She believes this is compounded due to the fact that she is now in menopause, been very inactive, off Wegovy.  When she was on Wegovy in the past there was minimal side effects.  She would like to know if it is an option to restart medication.    No other acute changes or concerns.  We had an extensive discussion about her nutrition habits.  BP stable.        Hemoglobin A1C (%)   Date Value   11/22/2023 5.1   06/07/2022 5.8   01/18/2022 5.7             ( goal A1C is < 7)   No components found for: \"LABMICR\"  No components found for: \"LDLCHOLESTEROL\", \"LDLCALC\"    (goal LDL is <100)   AST (U/L)   Date Value   11/22/2023 16     ALT (U/L)   Date Value   11/22/2023 8 (L)     BUN (mg/dL)   Date Value   11/22/2023 7     BP Readings from Last 3 Encounters:   04/03/25 122/80   03/21/25 110/74   09/26/24 118/82          (goal 120/80)    Past Medical History:   Diagnosis Date    Abnormal brain MRI     Anxiety     Asthma     Bicornuate uterus     Chronic back pain     Depression     Diverticulosis     GERD (gastroesophageal reflux disease)     Headache     Hearing loss     Hx of migraine headaches     Migraine     I have had them since i was young    Miscarriage     6 total    Obesity     Wears glasses

## 2025-04-15 ENCOUNTER — OFFICE VISIT (OUTPATIENT)
Age: 40
End: 2025-04-15
Payer: COMMERCIAL

## 2025-04-15 ENCOUNTER — PREP FOR PROCEDURE (OUTPATIENT)
Age: 40
End: 2025-04-15

## 2025-04-15 DIAGNOSIS — G56.21 CUBITAL TUNNEL SYNDROME, RIGHT: ICD-10-CM

## 2025-04-15 DIAGNOSIS — G56.03 BILATERAL CARPAL TUNNEL SYNDROME: ICD-10-CM

## 2025-04-15 DIAGNOSIS — G56.23 CUBITAL TUNNEL SYNDROME OF BOTH UPPER EXTREMITIES: Primary | ICD-10-CM

## 2025-04-15 DIAGNOSIS — G56.01 RIGHT CARPAL TUNNEL SYNDROME: ICD-10-CM

## 2025-04-15 PROCEDURE — 1036F TOBACCO NON-USER: CPT | Performed by: ORTHOPAEDIC SURGERY

## 2025-04-15 PROCEDURE — G8417 CALC BMI ABV UP PARAM F/U: HCPCS | Performed by: ORTHOPAEDIC SURGERY

## 2025-04-15 PROCEDURE — 99204 OFFICE O/P NEW MOD 45 MIN: CPT | Performed by: ORTHOPAEDIC SURGERY

## 2025-04-15 PROCEDURE — G8427 DOCREV CUR MEDS BY ELIG CLIN: HCPCS | Performed by: ORTHOPAEDIC SURGERY

## 2025-04-15 NOTE — PROGRESS NOTES
pleasant 39 y.o. RHD female who presents with chronic bilateral carpal tunnel syndrome and cubital tunnel syndrome.     Today we discussed the patient's current clinical condition.  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  Patient is amenable to carpal tunnel release and cubital tunnel release.  Will proceed with her right side initially and then address her left side in subsequent weeks/months.  All questions were answered to patient's satisfaction patient has been with the plan. We discussed that her old EMG did not show any compressive neuropathy but can have false negatives.  We discussed the potential repeat EMG test however given how severe her symptoms are even if this did not show evidence of compressive neuropathy we discussed that the chances hide that we would still be performing the surgery so we have elected to go ahead and proceed      Plan will be for right carpal tunnel release and right elbow cubital tu I will be out there any more than need to be and if there is 16 more total joint nnel release and possible ulnar nerve transposition.  Risks benefits and alternatives of carpal tunnel release surgery were discussed including injury to the median nerve, incomplete relief of symptoms, risk of infection, as well as no guarantee of outcome. We discussed the expected postoperative course and limitations postoperatively.  risks benefits and alternatives of ulnar nerve decompression, possible transposition were discussed.  Risks include but are not limited to infection, injury to the nerve, incomplete relief of symptoms, risk from anesthetic including death, postop hematoma, as well as no guarantee of outcome.  Past History:    Current Outpatient Medications:     metoprolol

## 2025-04-23 DIAGNOSIS — E66.9 OBESITY (BMI 30-39.9): ICD-10-CM

## 2025-04-24 NOTE — PROGRESS NOTES
DAY OF SURGERY/PROCEDURE  GUIDELINES    As a patient at the [facility], you can expect quality medical and nursing care that is centered on your individual needs. It is our goal to make your surgical experience as comfortable and excellent as possible.  ________________________________________________________________________    The following instructions are general guidelines, if any information on this sheet is different from what your doctor has instructed you to do, please follow your doctor's instructions.    Please arrive on 5/5 @ 845 am       Enter through entrance C. Check in at registration     Upon arrival you will be taken to the pre-operative area to get ready for surgery, your family will stay in the waiting room and visit with you once you are ready for surgery. Due to special limitations please limit visitation to 1-2 members of your family at a time. When it is time for surgery your family will return to the waiting room.    Nothing to eat, drink, smoke, suck or chew after midnight (no water, gum, mints, cigarettes, cigars, pipes, snuff, chewing tobacco, etc.) or your surgery may be canceled.     Take a shower or bath on the morning of your surgery/procedure (Hibiclens if directed) Do not apply any lotions.    Brush your teeth, but do not swallow any water    IN CASE OF ILLNESS - If you have a cold or flu symptoms (high fever, runny nose, sore throat, cough, etc.) rash, nausea, vomiting, loose stools, and/or recent contact with someone who has a contagious disease (chick pox, measles, etc.) please call your doctor before coming to the surgery center    Take a small sip of water with metoprolol.    DO NOT take anticoagulants (blood thinners, aspirin or aspirin-containing products) as instructed by your physician.    Stop all GLP-1 medications (Ozempic, Wegovy, Rybelsus, mounjaro, etc.) one week prior to surgery. Follow a clear liquid diet the day prior to surgery.    Leave all

## 2025-05-02 ENCOUNTER — ANESTHESIA EVENT (OUTPATIENT)
Dept: OPERATING ROOM | Age: 40
End: 2025-05-02
Payer: COMMERCIAL

## 2025-05-05 ENCOUNTER — HOSPITAL ENCOUNTER (OUTPATIENT)
Age: 40
Setting detail: OUTPATIENT SURGERY
Discharge: HOME | End: 2025-05-05
Attending: ORTHOPAEDIC SURGERY | Admitting: ORTHOPAEDIC SURGERY
Payer: COMMERCIAL

## 2025-05-05 ENCOUNTER — ANESTHESIA (OUTPATIENT)
Dept: OPERATING ROOM | Age: 40
End: 2025-05-05
Payer: COMMERCIAL

## 2025-05-05 VITALS
WEIGHT: 194 LBS | TEMPERATURE: 97.1 F | HEART RATE: 85 BPM | OXYGEN SATURATION: 93 % | BODY MASS INDEX: 40.72 KG/M2 | SYSTOLIC BLOOD PRESSURE: 127 MMHG | DIASTOLIC BLOOD PRESSURE: 93 MMHG | RESPIRATION RATE: 20 BRPM | HEIGHT: 58 IN

## 2025-05-05 PROCEDURE — 6360000002 HC RX W HCPCS: Performed by: NURSE PRACTITIONER

## 2025-05-05 PROCEDURE — 6370000000 HC RX 637 (ALT 250 FOR IP): Performed by: NURSE PRACTITIONER

## 2025-05-05 PROCEDURE — 3700000001 HC ADD 15 MINUTES (ANESTHESIA): Performed by: ORTHOPAEDIC SURGERY

## 2025-05-05 PROCEDURE — 3700000000 HC ANESTHESIA ATTENDED CARE: Performed by: ORTHOPAEDIC SURGERY

## 2025-05-05 PROCEDURE — 6360000002 HC RX W HCPCS: Performed by: ORTHOPAEDIC SURGERY

## 2025-05-05 PROCEDURE — 3600000003 HC SURGERY LEVEL 3 BASE: Performed by: ORTHOPAEDIC SURGERY

## 2025-05-05 PROCEDURE — 2709999900 HC NON-CHARGEABLE SUPPLY: Performed by: ORTHOPAEDIC SURGERY

## 2025-05-05 PROCEDURE — 3600000013 HC SURGERY LEVEL 3 ADDTL 15MIN: Performed by: ORTHOPAEDIC SURGERY

## 2025-05-05 PROCEDURE — 6360000002 HC RX W HCPCS: Performed by: SPECIALIST

## 2025-05-05 PROCEDURE — 64721 CARPAL TUNNEL SURGERY: CPT | Performed by: ORTHOPAEDIC SURGERY

## 2025-05-05 PROCEDURE — 7100000011 HC PHASE II RECOVERY - ADDTL 15 MIN: Performed by: ORTHOPAEDIC SURGERY

## 2025-05-05 PROCEDURE — 7100000000 HC PACU RECOVERY - FIRST 15 MIN: Performed by: ORTHOPAEDIC SURGERY

## 2025-05-05 PROCEDURE — 7100000010 HC PHASE II RECOVERY - FIRST 15 MIN: Performed by: ORTHOPAEDIC SURGERY

## 2025-05-05 PROCEDURE — 64718 REVISE ULNAR NERVE AT ELBOW: CPT | Performed by: ORTHOPAEDIC SURGERY

## 2025-05-05 PROCEDURE — 2580000003 HC RX 258: Performed by: ANESTHESIOLOGY

## 2025-05-05 PROCEDURE — 7100000001 HC PACU RECOVERY - ADDTL 15 MIN: Performed by: ORTHOPAEDIC SURGERY

## 2025-05-05 RX ORDER — ACETAMINOPHEN 500 MG
1000 TABLET ORAL ONCE
Status: COMPLETED | OUTPATIENT
Start: 2025-05-05 | End: 2025-05-05

## 2025-05-05 RX ORDER — DIPHENHYDRAMINE HYDROCHLORIDE 50 MG/ML
12.5 INJECTION, SOLUTION INTRAMUSCULAR; INTRAVENOUS
Status: DISCONTINUED | OUTPATIENT
Start: 2025-05-05 | End: 2025-05-05 | Stop reason: HOSPADM

## 2025-05-05 RX ORDER — ROPIVACAINE HYDROCHLORIDE 2 MG/ML
INJECTION, SOLUTION EPIDURAL; INFILTRATION; PERINEURAL
Status: DISCONTINUED
Start: 2025-05-05 | End: 2025-05-05 | Stop reason: HOSPADM

## 2025-05-05 RX ORDER — OXYCODONE AND ACETAMINOPHEN 5; 325 MG/1; MG/1
1 TABLET ORAL
Status: DISCONTINUED | OUTPATIENT
Start: 2025-05-05 | End: 2025-05-05 | Stop reason: HOSPADM

## 2025-05-05 RX ORDER — IBUPROFEN 800 MG/1
800 TABLET, FILM COATED ORAL
Qty: 30 TABLET | Refills: 0 | Status: SHIPPED | OUTPATIENT
Start: 2025-05-05 | End: 2025-05-15

## 2025-05-05 RX ORDER — SODIUM CHLORIDE 0.9 % (FLUSH) 0.9 %
5-40 SYRINGE (ML) INJECTION EVERY 12 HOURS SCHEDULED
Status: DISCONTINUED | OUTPATIENT
Start: 2025-05-05 | End: 2025-05-05 | Stop reason: HOSPADM

## 2025-05-05 RX ORDER — GLYCOPYRROLATE 0.2 MG/ML
0.4 INJECTION INTRAMUSCULAR; INTRAVENOUS ONCE
Status: DISCONTINUED | OUTPATIENT
Start: 2025-05-05 | End: 2025-05-05 | Stop reason: HOSPADM

## 2025-05-05 RX ORDER — SODIUM CHLORIDE 0.9 % (FLUSH) 0.9 %
5-40 SYRINGE (ML) INJECTION PRN
Status: DISCONTINUED | OUTPATIENT
Start: 2025-05-05 | End: 2025-05-05 | Stop reason: HOSPADM

## 2025-05-05 RX ORDER — NALOXONE HYDROCHLORIDE 0.4 MG/ML
INJECTION, SOLUTION INTRAMUSCULAR; INTRAVENOUS; SUBCUTANEOUS PRN
Status: DISCONTINUED | OUTPATIENT
Start: 2025-05-05 | End: 2025-05-05 | Stop reason: HOSPADM

## 2025-05-05 RX ORDER — PROPOFOL 10 MG/ML
INJECTION, EMULSION INTRAVENOUS
Status: DISCONTINUED | OUTPATIENT
Start: 2025-05-05 | End: 2025-05-05 | Stop reason: SDUPTHER

## 2025-05-05 RX ORDER — ONDANSETRON 2 MG/ML
INJECTION INTRAMUSCULAR; INTRAVENOUS
Status: DISCONTINUED | OUTPATIENT
Start: 2025-05-05 | End: 2025-05-05 | Stop reason: SDUPTHER

## 2025-05-05 RX ORDER — KETOROLAC TROMETHAMINE 30 MG/ML
INJECTION, SOLUTION INTRAMUSCULAR; INTRAVENOUS
Status: DISCONTINUED | OUTPATIENT
Start: 2025-05-05 | End: 2025-05-05 | Stop reason: SDUPTHER

## 2025-05-05 RX ORDER — HYDRALAZINE HYDROCHLORIDE 20 MG/ML
10 INJECTION INTRAMUSCULAR; INTRAVENOUS
Status: DISCONTINUED | OUTPATIENT
Start: 2025-05-05 | End: 2025-05-05 | Stop reason: HOSPADM

## 2025-05-05 RX ORDER — ONDANSETRON 2 MG/ML
4 INJECTION INTRAMUSCULAR; INTRAVENOUS
Status: DISCONTINUED | OUTPATIENT
Start: 2025-05-05 | End: 2025-05-05 | Stop reason: HOSPADM

## 2025-05-05 RX ORDER — MORPHINE SULFATE 2 MG/ML
2 INJECTION, SOLUTION INTRAMUSCULAR; INTRAVENOUS EVERY 5 MIN PRN
Status: DISCONTINUED | OUTPATIENT
Start: 2025-05-05 | End: 2025-05-05 | Stop reason: HOSPADM

## 2025-05-05 RX ORDER — ACETAMINOPHEN 500 MG
1000 TABLET ORAL EVERY 6 HOURS PRN
Qty: 30 TABLET | Refills: 0 | Status: SHIPPED | OUTPATIENT
Start: 2025-05-05

## 2025-05-05 RX ORDER — OXYCODONE AND ACETAMINOPHEN 5; 325 MG/1; MG/1
2 TABLET ORAL
Status: DISCONTINUED | OUTPATIENT
Start: 2025-05-05 | End: 2025-05-05 | Stop reason: HOSPADM

## 2025-05-05 RX ORDER — SODIUM CHLORIDE 9 MG/ML
INJECTION, SOLUTION INTRAVENOUS PRN
Status: DISCONTINUED | OUTPATIENT
Start: 2025-05-05 | End: 2025-05-05 | Stop reason: HOSPADM

## 2025-05-05 RX ORDER — MIDAZOLAM HYDROCHLORIDE 2 MG/2ML
2 INJECTION, SOLUTION INTRAMUSCULAR; INTRAVENOUS
Status: DISCONTINUED | OUTPATIENT
Start: 2025-05-05 | End: 2025-05-05 | Stop reason: HOSPADM

## 2025-05-05 RX ORDER — DEXAMETHASONE SODIUM PHOSPHATE 4 MG/ML
INJECTION, SOLUTION INTRA-ARTICULAR; INTRALESIONAL; INTRAMUSCULAR; INTRAVENOUS; SOFT TISSUE
Status: DISCONTINUED | OUTPATIENT
Start: 2025-05-05 | End: 2025-05-05 | Stop reason: SDUPTHER

## 2025-05-05 RX ORDER — IPRATROPIUM BROMIDE AND ALBUTEROL SULFATE 2.5; .5 MG/3ML; MG/3ML
1 SOLUTION RESPIRATORY (INHALATION)
Status: DISCONTINUED | OUTPATIENT
Start: 2025-05-05 | End: 2025-05-05 | Stop reason: HOSPADM

## 2025-05-05 RX ORDER — MIDAZOLAM HYDROCHLORIDE 1 MG/ML
INJECTION, SOLUTION INTRAMUSCULAR; INTRAVENOUS
Status: DISCONTINUED | OUTPATIENT
Start: 2025-05-05 | End: 2025-05-05 | Stop reason: SDUPTHER

## 2025-05-05 RX ORDER — METOCLOPRAMIDE HYDROCHLORIDE 5 MG/ML
10 INJECTION INTRAMUSCULAR; INTRAVENOUS
Status: DISCONTINUED | OUTPATIENT
Start: 2025-05-05 | End: 2025-05-05 | Stop reason: HOSPADM

## 2025-05-05 RX ORDER — MEPERIDINE HYDROCHLORIDE 50 MG/ML
12.5 INJECTION INTRAMUSCULAR; INTRAVENOUS; SUBCUTANEOUS EVERY 5 MIN PRN
Status: DISCONTINUED | OUTPATIENT
Start: 2025-05-05 | End: 2025-05-05 | Stop reason: HOSPADM

## 2025-05-05 RX ORDER — FENTANYL CITRATE 50 UG/ML
INJECTION, SOLUTION INTRAMUSCULAR; INTRAVENOUS
Status: DISCONTINUED | OUTPATIENT
Start: 2025-05-05 | End: 2025-05-05 | Stop reason: SDUPTHER

## 2025-05-05 RX ORDER — DEXAMETHASONE SODIUM PHOSPHATE 10 MG/ML
10 INJECTION, SOLUTION INTRAMUSCULAR; INTRAVENOUS ONCE
Status: DISCONTINUED | OUTPATIENT
Start: 2025-05-05 | End: 2025-05-05 | Stop reason: HOSPADM

## 2025-05-05 RX ORDER — LABETALOL HYDROCHLORIDE 5 MG/ML
10 INJECTION, SOLUTION INTRAVENOUS
Status: DISCONTINUED | OUTPATIENT
Start: 2025-05-05 | End: 2025-05-05 | Stop reason: HOSPADM

## 2025-05-05 RX ORDER — ROPIVACAINE HYDROCHLORIDE 5 MG/ML
INJECTION, SOLUTION EPIDURAL; INFILTRATION; PERINEURAL PRN
Status: DISCONTINUED | OUTPATIENT
Start: 2025-05-05 | End: 2025-05-05 | Stop reason: ALTCHOICE

## 2025-05-05 RX ORDER — LIDOCAINE HYDROCHLORIDE 10 MG/ML
INJECTION, SOLUTION EPIDURAL; INFILTRATION; INTRACAUDAL; PERINEURAL
Status: DISCONTINUED | OUTPATIENT
Start: 2025-05-05 | End: 2025-05-05 | Stop reason: SDUPTHER

## 2025-05-05 RX ORDER — LIDOCAINE HYDROCHLORIDE 10 MG/ML
1 INJECTION, SOLUTION EPIDURAL; INFILTRATION; INTRACAUDAL; PERINEURAL
Status: DISCONTINUED | OUTPATIENT
Start: 2025-05-05 | End: 2025-05-05 | Stop reason: HOSPADM

## 2025-05-05 RX ORDER — SODIUM CHLORIDE, SODIUM LACTATE, POTASSIUM CHLORIDE, CALCIUM CHLORIDE 600; 310; 30; 20 MG/100ML; MG/100ML; MG/100ML; MG/100ML
INJECTION, SOLUTION INTRAVENOUS CONTINUOUS
Status: DISCONTINUED | OUTPATIENT
Start: 2025-05-05 | End: 2025-05-05 | Stop reason: HOSPADM

## 2025-05-05 RX ORDER — ROPIVACAINE HYDROCHLORIDE 5 MG/ML
INJECTION, SOLUTION EPIDURAL; INFILTRATION; PERINEURAL
Status: DISCONTINUED
Start: 2025-05-05 | End: 2025-05-05 | Stop reason: HOSPADM

## 2025-05-05 RX ADMIN — SODIUM CHLORIDE, POTASSIUM CHLORIDE, SODIUM LACTATE AND CALCIUM CHLORIDE: 600; 310; 30; 20 INJECTION, SOLUTION INTRAVENOUS at 11:02

## 2025-05-05 RX ADMIN — MIDAZOLAM 2 MG: 1 INJECTION INTRAMUSCULAR; INTRAVENOUS at 11:02

## 2025-05-05 RX ADMIN — FENTANYL CITRATE 50 MCG: 50 INJECTION, SOLUTION INTRAMUSCULAR; INTRAVENOUS at 11:05

## 2025-05-05 RX ADMIN — Medication 2000 MG: at 11:11

## 2025-05-05 RX ADMIN — PROPOFOL 50 MG: 10 INJECTION, EMULSION INTRAVENOUS at 11:10

## 2025-05-05 RX ADMIN — LIDOCAINE HYDROCHLORIDE 50 MG: 10 INJECTION, SOLUTION EPIDURAL; INFILTRATION; INTRACAUDAL; PERINEURAL at 11:05

## 2025-05-05 RX ADMIN — PROPOFOL 150 MG: 10 INJECTION, EMULSION INTRAVENOUS at 11:05

## 2025-05-05 RX ADMIN — DEXAMETHASONE SODIUM PHOSPHATE 8 MG: 4 INJECTION INTRA-ARTICULAR; INTRALESIONAL; INTRAMUSCULAR; INTRAVENOUS; SOFT TISSUE at 11:10

## 2025-05-05 RX ADMIN — ONDANSETRON 4 MG: 2 INJECTION, SOLUTION INTRAMUSCULAR; INTRAVENOUS at 11:36

## 2025-05-05 RX ADMIN — FENTANYL CITRATE 50 MCG: 50 INJECTION, SOLUTION INTRAMUSCULAR; INTRAVENOUS at 11:17

## 2025-05-05 RX ADMIN — ACETAMINOPHEN 1000 MG: 500 TABLET ORAL at 09:02

## 2025-05-05 RX ADMIN — KETOROLAC TROMETHAMINE 30 MG: 30 INJECTION, SOLUTION INTRAMUSCULAR at 11:36

## 2025-05-05 ASSESSMENT — PAIN DESCRIPTION - DESCRIPTORS: DESCRIPTORS: NUMBNESS;TINGLING

## 2025-05-05 ASSESSMENT — PAIN - FUNCTIONAL ASSESSMENT: PAIN_FUNCTIONAL_ASSESSMENT: 0-10

## 2025-05-05 NOTE — OP NOTE
Operative Note      Patient: Kaylah Banks  YOB: 1985  MRN: [id number]    Date of Procedure: 5/5/2025    Pre-Op Diagnosis Codes:      * Right carpal tunnel syndrome [G56.01]     * Cubital tunnel syndrome, right [G56.21]    Post-Op Diagnosis: Same       Procedure(s):  RIGHT CARPAL TUNNEL RELEASE, RIGHT ELBOW CUBITAL TUNNEL DECOMPRESSION    Surgeon(s):  Cooper Mcfarland MD    Assistant:   Karli Cummings CNP    Anesthesia: General    Estimated Blood Loss (mL): Minimal    Complications: None    Specimens:   * No specimens in log *    Implants:  * No implants in log *      Drains: * No LDAs found *    Findings:  Infection Present At Time Of Surgery (PATOS) (choose all levels that have infection present):  No infection present  Other Findings:     Detailed Description of Procedure:   Informed consent was obtained in the office.  I marked the patient's right hand in an right elbow the preoperative holding area.  They were brought back to the operating room where monitored sedation was begun.  A timeout was performed.    The right arm was prepped and draped in normal sterile fashion.  Prophylactic antibiotics were given.  A tourniquet was inflated.  I made an incision through the skin and subcutaneous tissue and superficial palmar fascia.  I made a small rent in the transverse carpal ligament.  I used a combination of knife and scissors to finish releasing the transverse carpal ligament proximally and distally.  I protected the median nerve at all times and it was intact at the end of the case.     I then turned my attention to the elbow.  I made a curvilinear incision through the skin.  I dissected through the subcutaneous tissue.  I did not encounter any branches of the medial antebrachial cutaneous nerve.  I opened the cubital tunnel with a combination of knife and scissors.  I released proximally up to the arcade of [place] making sure there were no tight bands over the nerve.  I have released Lupe's

## 2025-05-05 NOTE — DISCHARGE INSTRUCTIONS
Leave the surgical bandage in place for 2 days.    Then you can remove it and shower and replace with a new bandage or a large Band-Aid each day.    It is okay and encouraged to move the fingers throughout the day.    It is okay to use that hand for activities of daily living.    Avoid heavy gripping or lifting until seen in the office.    Call your doctor now or seek immediate medical care if:     You have pain that does not get better after you take pain medicine.   You have a fever over 101°F.   You have chills   You have signs of infection, such as:   Increased pain, swelling, warmth, or redness.   Red streaks leading from the incision.   Pus draining from the incision.     Activity  You have had anesthesia today  Do not drive, operate heavy equipment, consume alcoholic beverages, or make any important decisions  for 24 hours   If you are taking pain medication: Do not drive or consume alcohol.  Take your time changing positions today. You may feel light headed or dizzy if you move too quickly.   Continue your home medications as ordered by your physician.    Diet   You can eat your normal diet when you feel well. You should start off with bland foods like chicken soup, toast, or yogurt. Then advance as tolerated.  Drink plenty of fluids (unless your doctor tells you not to). Your urine should be very lightly colored without a strong odor.

## 2025-05-05 NOTE — ANESTHESIA PRE PROCEDURE
Department of Anesthesiology  Preprocedure Note       Name:  Kaylah Banks   Age:  40 y.o.  :  1985                                          MRN:  [id number]         Date:  2025      Surgeon: Surgeon(s):  Cooper Mcfarland MD    Procedure: Procedure(s):  RIGHT CARPAL TUNNEL RELEASE, RIGHT ELBOW CUBITAL TUNNEL DECOMPRESSION, POSSIBLE ULNAR NERVE TRANSPOSITION    Medications prior to admission:   Prior to Admission medications    Medication Sig Start Date End Date Taking? Authorizing Provider   tirzepatide-weight management (ZEPBOUND) 5 MG/0.5ML SOAJ subCUTAneous auto-injector pen Inject 5 mg into the skin every 7 days 25  Yes Bruno Fleming PA-C   metoprolol succinate (TOPROL XL) 25 MG extended release tablet Take 1 tablet by mouth daily 3/13/25  Yes Leandro Moy MD   ferrous sulfate (IRON 325) 325 (65 Fe) MG tablet Take 1 tablet by mouth every other day 3/25/25  Yes Bruno Fleming PA-C   albuterol sulfate HFA (PROVENTIL;VENTOLIN;PROAIR) 108 (90 Base) MCG/ACT inhaler Inhale 2 puffs into the lungs every 6 hours as needed   Yes Leandro Moy MD   estradiol (CLIMARA) 0.075 MG/24HR Place 1 patch onto the skin once a week 25  Yes Leandro Moy MD   amitriptyline (ELAVIL) 75 MG tablet Take 1 tablet by mouth nightly 3/18/25 6/16/25 Yes Beth Faith DO   pantoprazole (PROTONIX) 40 MG tablet Take 1 tablet by mouth daily 25 Yes Bruno Fleming PA-C   buPROPion (WELLBUTRIN XL) 150 MG extended release tablet Take 1 tablet by mouth every morning 3/5/24  Yes Bruno Fleming PA-C   Cyanocobalamin (VITAMIN B-12 PO) Take by mouth daily   Yes Leandro Moy MD   docusate sodium (COLACE) 50 MG capsule Take 1 capsule by mouth 2 times daily    Leandro Moy MD   ZOLMitriptan (ZOMIG) 5 MG tablet Take 1 tablet by mouth as needed for Migraine 4/10/23   Shama Martins MD   ibuprofen (ADVIL;MOTRIN) 800 MG tablet Take 1 tablet by mouth 2 times daily as needed for Pain

## 2025-05-05 NOTE — ANESTHESIA POSTPROCEDURE EVALUATION
Department of Anesthesiology  Postprocedure Note    Patient: Kaylah Banks  MRN: [id number]  YOB: 1985  Date of evaluation: 5/5/2025    Procedure Summary       Date: 05/05/25 Room / Location: [address]    Anesthesia Start: 1102 Anesthesia Stop: 1158    Procedure: RIGHT CARPAL TUNNEL RELEASE, RIGHT ELBOW CUBITAL TUNNEL DECOMPRESSION (Right) Diagnosis:       Right carpal tunnel syndrome      Cubital tunnel syndrome, right      (Right carpal tunnel syndrome [G56.01])      (Cubital tunnel syndrome, right [G56.21])    Surgeons: Cooper Mcfarland MD Responsible Provider: Matthias Chand MD    Anesthesia Type: general ASA Status: 2            Anesthesia Type: No value filed.    Gilma Phase I: Gilma Score: 10    Gilma Phase II:      Anesthesia Post Evaluation    Patient location during evaluation: PACU  Patient participation: complete - patient participated  Level of consciousness: awake and alert  Airway patency: patent  Nausea & Vomiting: no nausea and no vomiting  Cardiovascular status: hemodynamically stable  Respiratory status: room air and spontaneous ventilation  Hydration status: euvolemic  Multimodal analgesia pain management approach  Pain management: adequate    No notable events documented.

## 2025-05-05 NOTE — H&P
ORTHOPEDIC PREOP HISTORY AND PHYSICAL      HPI / Chief Complaint  Kaylah Banks is a 40 y.o. female who presents for right hand pain numbness and tingling    Past Medical History  Kaylah  has a past medical history of Abnormal brain MRI, Anxiety, Asthma, Bicornuate uterus, Chronic back pain, Depression, Diverticulosis, GERD (gastroesophageal reflux disease), Headache, Hearing loss, Hx of migraine headaches, Migraine, Miscarriage, Obesity, Tachycardia, and Wears glasses.    Past Surgical History  Kaylah  has a past surgical history that includes Tubal ligation (10/2015); Breast reduction surgery (10/2016); Colonoscopy (2004); Hysterectomy, total abdominal (25); Tonsillectomy (); and  section (09).    Current Medications  Current Facility-Administered Medications   Medication Dose Route Frequency Provider Last Rate Last Admin    lidocaine PF 1 % injection 1 mL  1 mL IntraDERmal Once PRN Matthias Chand MD        lactated ringers infusion   IntraVENous Continuous Matthias Chand MD        sodium chloride flush 0.9 % injection 5-40 mL  5-40 mL IntraVENous 2 times per day Mathtias Chand MD        sodium chloride flush 0.9 % injection 5-40 mL  5-40 mL IntraVENous PRN Matthias Chand MD        0.9 % sodium chloride infusion   IntraVENous PRN Matthias Chand MD        ceFAZolin (ANCEF) 2000 mg in sterile water 20 mL IV syringe  2,000 mg IntraVENous On Call to OR Karli Cummings APRN - ANGEL        dexAMETHasone (PF) (DECADRON) injection 10 mg  10 mg IntraVENous Once TrussLisae APRN - CNP        sodium chloride flush 0.9 % injection 5-40 mL  5-40 mL IntraVENous 2 times per day Truss, Karli, APRN - CNP        sodium chloride flush 0.9 % injection 5-40 mL  5-40 mL IntraVENous PRN TrussDariuszKarli APRN - CNP           Allergies  Allergies have been reviewed.  Kaylah is allergic to adhesive tape.    Social History  Kaylah  reports that she has never smoked. She has never used

## 2025-05-20 ENCOUNTER — OFFICE VISIT (OUTPATIENT)
Age: 40
End: 2025-05-20

## 2025-05-20 VITALS — BODY MASS INDEX: 40.72 KG/M2 | HEIGHT: 58 IN | WEIGHT: 194 LBS

## 2025-05-20 DIAGNOSIS — G56.21 CUBITAL TUNNEL SYNDROME, RIGHT: ICD-10-CM

## 2025-05-20 DIAGNOSIS — G56.01 RIGHT CARPAL TUNNEL SYNDROME: Primary | ICD-10-CM

## 2025-05-20 PROCEDURE — 99024 POSTOP FOLLOW-UP VISIT: CPT | Performed by: ORTHOPAEDIC SURGERY

## 2025-05-20 NOTE — PROGRESS NOTES
[facility] Orthopedics & Sports Medicine      [facility] ORTHOPAEDICS AND SPORTS MEDICINE  [facility]  [place] [address]  Dept: [phone]  Dept Fax: [phone]    Chief Compliant:  Chief Complaint   Patient presents with    Post-Op Check     1st post op - RCT/cubital tunnel        History of Present Illness:  5/20/25:This is a pleasant 40 y.o. female who is now 2 weeks status post right carpal tunnel release and right elbow cubital tunnel release.  She is doing excellent.  She notes that her preoperative paresthesias have completely resolved.  She did notice some irritation around the cubital tunnel incision but she took some Benadryl and this has improved.    Physical Exam: The right wrist and right elbow incisions are completely healed, no erythema, no drainage.  She has full range of motion of the elbow wrist and digits.    Imaging: None      Assessment and Plan:    This is a pleasant 40 y.o. female who is status post the above and doing excellent.  She can start to increase her activity as tolerated.  We discussed reasons to follow-up and she can follow-up as needed.    She has a same problem on her contralateral left side.  At any point she can call and schedule for left carpal tunnel release and left elbow cubital tunnel release, possible ulnar nerve transposition.         Past History:    Current Outpatient Medications:     ibuprofen (ADVIL;MOTRIN) 800 MG tablet, Take 1 tablet by mouth 3 times daily (with meals) for 10 days, Disp: 30 tablet, Rfl: 0    acetaminophen (TYLENOL) 500 MG tablet, Take 2 tablets by mouth every 6 hours as needed for Pain, Disp: 30 tablet, Rfl: 0    tirzepatide-weight management (ZEPBOUND) 5 MG/0.5ML SOAJ subCUTAneous auto-injector pen, Inject 5 mg into the skin every 7 days, Disp: 2 mL, Rfl: 0    metoprolol succinate (TOPROL XL) 25 MG extended release tablet, Take 1 tablet by mouth daily, Disp: , Rfl:

## 2025-05-28 DIAGNOSIS — E66.9 OBESITY (BMI 30-39.9): ICD-10-CM

## 2025-06-03 ENCOUNTER — OFFICE VISIT (OUTPATIENT)
Dept: PRIMARY CARE CLINIC | Age: 40
End: 2025-06-03
Payer: COMMERCIAL

## 2025-06-03 VITALS
OXYGEN SATURATION: 98 % | HEIGHT: 58 IN | SYSTOLIC BLOOD PRESSURE: 120 MMHG | HEART RATE: 92 BPM | DIASTOLIC BLOOD PRESSURE: 82 MMHG | RESPIRATION RATE: 16 BRPM | BODY MASS INDEX: 39.59 KG/M2 | WEIGHT: 188.6 LBS

## 2025-06-03 DIAGNOSIS — E66.9 OBESITY (BMI 30-39.9): Primary | ICD-10-CM

## 2025-06-03 DIAGNOSIS — F33.1 MODERATE EPISODE OF RECURRENT MAJOR DEPRESSIVE DISORDER (HCC): ICD-10-CM

## 2025-06-03 DIAGNOSIS — Z12.31 SCREENING MAMMOGRAM FOR BREAST CANCER: ICD-10-CM

## 2025-06-03 PROCEDURE — G8417 CALC BMI ABV UP PARAM F/U: HCPCS | Performed by: PHYSICIAN ASSISTANT

## 2025-06-03 PROCEDURE — G8427 DOCREV CUR MEDS BY ELIG CLIN: HCPCS | Performed by: PHYSICIAN ASSISTANT

## 2025-06-03 PROCEDURE — 99214 OFFICE O/P EST MOD 30 MIN: CPT | Performed by: PHYSICIAN ASSISTANT

## 2025-06-03 PROCEDURE — 1036F TOBACCO NON-USER: CPT | Performed by: PHYSICIAN ASSISTANT

## 2025-06-03 RX ORDER — BUPROPION HYDROCHLORIDE 150 MG/1
150 TABLET ORAL EVERY MORNING
Qty: 90 TABLET | Refills: 1 | Status: SHIPPED | OUTPATIENT
Start: 2025-06-03

## 2025-06-03 SDOH — ECONOMIC STABILITY: FOOD INSECURITY: WITHIN THE PAST 12 MONTHS, YOU WORRIED THAT YOUR FOOD WOULD RUN OUT BEFORE YOU GOT MONEY TO BUY MORE.: NEVER TRUE

## 2025-06-03 SDOH — ECONOMIC STABILITY: FOOD INSECURITY: WITHIN THE PAST 12 MONTHS, THE FOOD YOU BOUGHT JUST DIDN'T LAST AND YOU DIDN'T HAVE MONEY TO GET MORE.: NEVER TRUE

## 2025-06-03 ASSESSMENT — PATIENT HEALTH QUESTIONNAIRE - PHQ9
6. FEELING BAD ABOUT YOURSELF - OR THAT YOU ARE A FAILURE OR HAVE LET YOURSELF OR YOUR FAMILY DOWN: NOT AT ALL
9. THOUGHTS THAT YOU WOULD BE BETTER OFF DEAD, OR OF HURTING YOURSELF: NOT AT ALL
SUM OF ALL RESPONSES TO PHQ QUESTIONS 1-9: 2
5. POOR APPETITE OR OVEREATING: NOT AT ALL
SUM OF ALL RESPONSES TO PHQ QUESTIONS 1-9: 2
2. FEELING DOWN, DEPRESSED OR HOPELESS: NOT AT ALL
7. TROUBLE CONCENTRATING ON THINGS, SUCH AS READING THE NEWSPAPER OR WATCHING TELEVISION: NOT AT ALL
4. FEELING TIRED OR HAVING LITTLE ENERGY: SEVERAL DAYS
10. IF YOU CHECKED OFF ANY PROBLEMS, HOW DIFFICULT HAVE THESE PROBLEMS MADE IT FOR YOU TO DO YOUR WORK, TAKE CARE OF THINGS AT HOME, OR GET ALONG WITH OTHER PEOPLE: SOMEWHAT DIFFICULT
3. TROUBLE FALLING OR STAYING ASLEEP: SEVERAL DAYS
8. MOVING OR SPEAKING SO SLOWLY THAT OTHER PEOPLE COULD HAVE NOTICED. OR THE OPPOSITE, BEING SO FIGETY OR RESTLESS THAT YOU HAVE BEEN MOVING AROUND A LOT MORE THAN USUAL: NOT AT ALL
1. LITTLE INTEREST OR PLEASURE IN DOING THINGS: NOT AT ALL

## 2025-06-03 ASSESSMENT — ENCOUNTER SYMPTOMS
RHINORRHEA: 0
VOMITING: 0
COUGH: 0
CONSTIPATION: 0
NAUSEA: 0
BACK PAIN: 0
SINUS PAIN: 0
DIARRHEA: 0
SHORTNESS OF BREATH: 0
ABDOMINAL PAIN: 0

## 2025-06-03 NOTE — PROGRESS NOTES
[facility]  [facility]  [address] [place] [address] [address]  [address]  [facility] [address]  Dept: [phone]  Dept Fax: [phone]    Kaylah Banks is a 40 y.o. female who presents today for her medical conditions/complaints as noted below.    Chief Complaint   Patient presents with    Weight Management     Medication check        HPI:     Patient presents to the office for medication/weight follow-up.  2 months ago she was started on Zepbound for weight loss.  She has completed the initial 2 doses 2.5 mg and 5 mg.  Slight constipation, but this has resolved.  Overall is doing very well.  She has increased her whole foods including protein, fruit and water.  She is doing very well with weight loss.  She feels this medication is working much better than Wegovy.  She is down about 9 pounds in the first couple months.    Otherwise, patient reports she is doing great.  No other new or acute changes.  Medications are stable.  Blood pressure stable.        Hemoglobin A1C (%)   Date Value   11/22/2023 5.1   06/07/2022 5.8   01/18/2022 5.7             ( goal A1C is < 7)   No components found for: \"LABMICR\"  No components found for: \"LDLCHOLESTEROL\", \"LDLCALC\"    (goal LDL is <100)   AST (U/L)   Date Value   11/22/2023 16     ALT (U/L)   Date Value   11/22/2023 8 (L)     BUN (mg/dL)   Date Value   11/22/2023 7     BP Readings from Last 3 Encounters:   06/03/25 120/82   05/05/25 (!) 127/93   04/03/25 122/80          (goal 120/80)    Past Medical History:   Diagnosis Date    Abnormal brain MRI     Anxiety     Asthma     Bicornuate uterus     Chronic back pain     Depression     Diverticulosis     GERD (gastroesophageal reflux disease)     Headache     Hearing loss     Hx of migraine headaches     Migraine     I have had them since i was young    Miscarriage     6 total    Obesity     Tachycardia     Wears glasses       Past Surgical History:   Procedure Laterality Date    ARM SURGERY Right 5/5/2025

## 2025-06-25 DIAGNOSIS — E66.9 OBESITY (BMI 30-39.9): ICD-10-CM

## 2025-06-25 RX ORDER — TIRZEPATIDE 7.5 MG/.5ML
INJECTION, SOLUTION SUBCUTANEOUS
Qty: 2 ML | Refills: 0 | Status: SHIPPED | OUTPATIENT
Start: 2025-06-25

## 2025-06-25 RX ORDER — AMITRIPTYLINE HYDROCHLORIDE 75 MG/1
75 TABLET ORAL NIGHTLY
Qty: 30 TABLET | Refills: 2 | Status: SHIPPED | OUTPATIENT
Start: 2025-06-25 | End: 2025-06-27

## 2025-06-25 NOTE — TELEPHONE ENCOUNTER
Pharmacy requesting refill of Elavil 75 mg.    Medication active on med list yes    Date of last Rx: 3/18/2025 #30 with 2 refills   verified by KIERAN Waters    Date of last appointment 4/9/2024    Next Visit Date:  8/5/2025 with Dr. Faith

## 2025-06-27 RX ORDER — AMITRIPTYLINE HYDROCHLORIDE 75 MG/1
TABLET ORAL
Qty: 30 TABLET | Refills: 2 | Status: SHIPPED | OUTPATIENT
Start: 2025-06-27

## 2025-07-18 ENCOUNTER — OFFICE VISIT (OUTPATIENT)
Dept: FAMILY MEDICINE CLINIC | Age: 40
End: 2025-07-18
Payer: COMMERCIAL

## 2025-07-18 ENCOUNTER — HOSPITAL ENCOUNTER (OUTPATIENT)
Age: 40
Setting detail: SPECIMEN
Discharge: HOME | End: 2025-07-18

## 2025-07-18 VITALS
BODY MASS INDEX: 38.25 KG/M2 | RESPIRATION RATE: 16 BRPM | DIASTOLIC BLOOD PRESSURE: 80 MMHG | HEART RATE: 84 BPM | SYSTOLIC BLOOD PRESSURE: 116 MMHG | WEIGHT: 183 LBS | TEMPERATURE: 97.7 F | OXYGEN SATURATION: 99 %

## 2025-07-18 DIAGNOSIS — R30.0 DYSURIA: ICD-10-CM

## 2025-07-18 DIAGNOSIS — N30.01 ACUTE CYSTITIS WITH HEMATURIA: ICD-10-CM

## 2025-07-18 DIAGNOSIS — R35.0 URINARY FREQUENCY: ICD-10-CM

## 2025-07-18 DIAGNOSIS — N30.01 ACUTE CYSTITIS WITH HEMATURIA: Primary | ICD-10-CM

## 2025-07-18 LAB
BILIRUBIN, POC: NEGATIVE
BLOOD URINE, POC: NORMAL
CLARITY, POC: CLEAR
COLOR, POC: YELLOW
GLUCOSE URINE, POC: NEGATIVE MG/DL
KETONES, POC: NEGATIVE MG/DL
LEUKOCYTE EST, POC: NORMAL
NITRITE, POC: NEGATIVE
PH, POC: 7
PROTEIN, POC: NEGATIVE MG/DL
SPECIFIC GRAVITY, POC: 1.01
UROBILINOGEN, POC: 0.2 MG/DL

## 2025-07-18 PROCEDURE — 99213 OFFICE O/P EST LOW 20 MIN: CPT | Performed by: NURSE PRACTITIONER

## 2025-07-18 PROCEDURE — 81002 URINALYSIS NONAUTO W/O SCOPE: CPT | Performed by: NURSE PRACTITIONER

## 2025-07-18 PROCEDURE — G8417 CALC BMI ABV UP PARAM F/U: HCPCS | Performed by: NURSE PRACTITIONER

## 2025-07-18 PROCEDURE — G8427 DOCREV CUR MEDS BY ELIG CLIN: HCPCS | Performed by: NURSE PRACTITIONER

## 2025-07-18 PROCEDURE — 1036F TOBACCO NON-USER: CPT | Performed by: NURSE PRACTITIONER

## 2025-07-18 RX ORDER — NITROFURANTOIN 25; 75 MG/1; MG/1
100 CAPSULE ORAL 2 TIMES DAILY
Qty: 10 CAPSULE | Refills: 0 | Status: SHIPPED | OUTPATIENT
Start: 2025-07-18 | End: 2025-07-23

## 2025-07-18 NOTE — PROGRESS NOTES
[facility] WALK-IN  [address]  [address]  [facility] [address]  Dept: [phone]     Kaylah Banks is a 40 y.o. female Established patient, who presents to the walk-in clinic today with conditions/complaints as noted below:    Chief Complaint   Patient presents with    Urinary Frequency     Urinary frequency- large amounts of urine, painful after urinating. Sx started x3-4 days ago. Not taking any OTC medications.          HPI:     HPI  Pt presented to the walk in clinic today with c/o dysuria. This is a new problem. The current episode started 3-4 days ago. Associated symptoms include: frequency.  Pertinent negatives include: No fever, chills, abdominal pain, flank pain, n/v, vaginal discharge.  Pt has not tried any OTCs. Used to get frequent UTIs but no longer does. No hx of stones.     Past Medical History:   Diagnosis Date    Abnormal brain MRI     Anxiety     Asthma     Bicornuate uterus     Chronic back pain     Depression     Diverticulosis     GERD (gastroesophageal reflux disease)     Headache     Hearing loss     Hx of migraine headaches     Migraine     I have had them since i was young    Miscarriage     6 total    Obesity     Tachycardia     Wears glasses        Current Outpatient Medications   Medication Sig Dispense Refill    nitrofurantoin, macrocrystal-monohydrate, (MACROBID) 100 MG capsule Take 1 capsule by mouth 2 times daily for 5 days 10 capsule 0    amitriptyline (ELAVIL) 75 MG tablet TAKE 1 TABLET BY MOUTH ONCE NIGHTLY 30 tablet 2    ZEPBOUND 7.5 MG/0.5ML SOAJ subCUTAneous auto-injector pen INJECT 7.5 MG UNDER THE SKIN ONCE WEEKLY 2 mL 0    buPROPion (WELLBUTRIN XL) 150 MG extended release tablet Take 1 tablet by mouth every morning 90 tablet 1    acetaminophen (TYLENOL) 500 MG tablet Take 2 tablets by mouth every 6 hours as needed for Pain 30 tablet 0    metoprolol succinate (TOPROL XL) 25 MG extended release

## 2025-07-19 DIAGNOSIS — K21.9 GASTROESOPHAGEAL REFLUX DISEASE WITHOUT ESOPHAGITIS: Chronic | ICD-10-CM

## 2025-07-20 LAB
MICROORGANISM SPEC CULT: ABNORMAL
SERVICE CMNT-IMP: ABNORMAL
SPECIMEN DESCRIPTION: ABNORMAL

## 2025-07-21 RX ORDER — PANTOPRAZOLE SODIUM 40 MG/1
40 TABLET, DELAYED RELEASE ORAL DAILY
Qty: 90 TABLET | Refills: 1 | Status: SHIPPED | OUTPATIENT
Start: 2025-07-21 | End: 2026-07-21

## 2025-08-04 DIAGNOSIS — E66.9 OBESITY (BMI 30-39.9): ICD-10-CM

## 2025-08-05 ENCOUNTER — OFFICE VISIT (OUTPATIENT)
Dept: NEUROLOGY | Age: 40
End: 2025-08-05
Payer: COMMERCIAL

## 2025-08-05 VITALS
WEIGHT: 185.8 LBS | HEIGHT: 58 IN | SYSTOLIC BLOOD PRESSURE: 103 MMHG | BODY MASS INDEX: 39 KG/M2 | DIASTOLIC BLOOD PRESSURE: 72 MMHG | HEART RATE: 90 BPM

## 2025-08-05 DIAGNOSIS — R20.0 NUMBNESS AND TINGLING: ICD-10-CM

## 2025-08-05 DIAGNOSIS — R20.2 NUMBNESS AND TINGLING: ICD-10-CM

## 2025-08-05 DIAGNOSIS — R90.89 ABNORMAL FINDING ON MRI OF BRAIN: ICD-10-CM

## 2025-08-05 DIAGNOSIS — M54.81 OCCIPITAL NEURALGIA, UNSPECIFIED LATERALITY: ICD-10-CM

## 2025-08-05 DIAGNOSIS — M54.2 NECK PAIN: ICD-10-CM

## 2025-08-05 DIAGNOSIS — G43.709 CHRONIC MIGRAINE W/O AURA W/O STATUS MIGRAINOSUS, NOT INTRACTABLE: Primary | ICD-10-CM

## 2025-08-05 PROCEDURE — 99214 OFFICE O/P EST MOD 30 MIN: CPT | Performed by: PSYCHIATRY & NEUROLOGY

## 2025-08-05 PROCEDURE — 1036F TOBACCO NON-USER: CPT | Performed by: PSYCHIATRY & NEUROLOGY

## 2025-08-05 PROCEDURE — G8427 DOCREV CUR MEDS BY ELIG CLIN: HCPCS | Performed by: PSYCHIATRY & NEUROLOGY

## 2025-08-05 PROCEDURE — G8417 CALC BMI ABV UP PARAM F/U: HCPCS | Performed by: PSYCHIATRY & NEUROLOGY

## 2025-08-05 RX ORDER — AMITRIPTYLINE HYDROCHLORIDE 75 MG/1
75 TABLET ORAL NIGHTLY
Qty: 30 TABLET | Refills: 11 | Status: SHIPPED | OUTPATIENT
Start: 2025-08-05

## 2025-08-05 RX ORDER — FREMANEZUMAB-VFRM 225 MG/1.5ML
225 INJECTION SUBCUTANEOUS
Qty: 1 ADJUSTABLE DOSE PRE-FILLED PEN SYRINGE | Refills: 11 | Status: SHIPPED | OUTPATIENT
Start: 2025-08-05

## 2025-08-05 RX ORDER — TIRZEPATIDE 7.5 MG/.5ML
INJECTION, SOLUTION SUBCUTANEOUS
Qty: 2 ML | Refills: 0 | Status: SHIPPED | OUTPATIENT
Start: 2025-08-05

## 2025-08-08 ENCOUNTER — TELEPHONE (OUTPATIENT)
Dept: NEUROLOGY | Age: 40
End: 2025-08-08

## 2025-08-27 ENCOUNTER — PATIENT MESSAGE (OUTPATIENT)
Dept: PRIMARY CARE CLINIC | Age: 40
End: 2025-08-27

## 2025-08-27 DIAGNOSIS — E66.9 OBESITY (BMI 30-39.9): Primary | ICD-10-CM

## (undated) DEVICE — GLOVE SURG SZ 8 L12IN THK75MIL DK GRN LTX FREE

## (undated) DEVICE — SUTURE MONOCRYL + SZ 4-0 L27IN ABSRB UD L19MM PS-2 3/8 CIR MCP426H

## (undated) DEVICE — TUBING SUCT 12FR MAL ALUM SHFT FN CAP VENT UNIV CONN W/ OBT

## (undated) DEVICE — STOCKINETTE,IMPERVIOUS,12X48,STERILE: Brand: MEDLINE

## (undated) DEVICE — GLOVE ORANGE PI 8   MSG9080

## (undated) DEVICE — CORD,CAUTERY,BIPOLAR,STERILE: Brand: MEDLINE

## (undated) DEVICE — GLOVE SURG SZ 65 THK91MIL LTX FREE SYN POLYISOPRENE

## (undated) DEVICE — ELECTRODE PT RET AD L9FT HI MOIST COND ADH HYDRGEL CORDED

## (undated) DEVICE — SYRINGE MED 10ML LUERLOCK TIP W/O SFTY DISP

## (undated) DEVICE — APPLICATOR MEDICATED 26 CC SOLUTION HI LT ORNG CHLORAPREP

## (undated) DEVICE — SOLUTION IRRIG 1000ML 09% SOD CHL USP PIC PLAS CONTAINER

## (undated) DEVICE — CLOTH PRE OP W9XL10.5IN 2% CHG FRAGRANCE RNS FREE READYPREP

## (undated) DEVICE — LIQUIBAND RAPID ADHESIVE 36/CS 0.8ML: Brand: MEDLINE

## (undated) DEVICE — TUBING, SUCTION, 3/16" X 10', STRAIGHT: Brand: MEDLINE

## (undated) DEVICE — DRAPE,U/ SHT,SPLIT,PLAS,STERIL: Brand: MEDLINE

## (undated) DEVICE — DISPOSABLE TOURNIQUET CUFF SINGLE BLADDER, SINGLE PORT AND QUICK CONNECT CONNECTOR: Brand: COLOR CUFF

## (undated) DEVICE — STRAP,POSITIONING,KNEE/BODY,FOAM,4X60": Brand: MEDLINE

## (undated) DEVICE — MHPB HAND AND FOOT PACK: Brand: MEDLINE INDUSTRIES, INC.